# Patient Record
Sex: FEMALE | Race: WHITE | NOT HISPANIC OR LATINO | Employment: UNEMPLOYED | URBAN - METROPOLITAN AREA
[De-identification: names, ages, dates, MRNs, and addresses within clinical notes are randomized per-mention and may not be internally consistent; named-entity substitution may affect disease eponyms.]

---

## 2019-01-19 ENCOUNTER — HOSPITAL ENCOUNTER (OUTPATIENT)
Dept: OCCUPATIONAL THERAPY | Facility: HOSPITAL | Age: 44
End: 2019-01-20
Attending: INTERNAL MEDICINE | Admitting: INTERNAL MEDICINE

## 2019-01-19 LAB
ABS NEUT (OLG): 7.16 X10(3)/MCL (ref 2.1–9.2)
ALBUMIN SERPL-MCNC: 3.4 GM/DL (ref 3.4–5)
ALBUMIN/GLOB SERPL: 0.9 RATIO (ref 1.1–2)
ALP SERPL-CCNC: 750 UNIT/L (ref 38–126)
ALT SERPL-CCNC: 200 UNIT/L (ref 12–78)
APPEARANCE, UA: CLEAR
AST SERPL-CCNC: 195 UNIT/L (ref 15–37)
BACTERIA SPEC CULT: ABNORMAL /HPF
BASOPHILS # BLD AUTO: 0 X10(3)/MCL (ref 0–0.2)
BASOPHILS NFR BLD AUTO: 0 %
BILIRUB SERPL-MCNC: 3.4 MG/DL (ref 0.2–1)
BILIRUB UR QL STRIP: ABNORMAL
BILIRUBIN DIRECT+TOT PNL SERPL-MCNC: 0.9 MG/DL (ref 0–0.8)
BILIRUBIN DIRECT+TOT PNL SERPL-MCNC: 2.5 MG/DL (ref 0–0.5)
BUN SERPL-MCNC: 13 MG/DL (ref 7–18)
CALCIUM SERPL-MCNC: 9.5 MG/DL (ref 8.5–10.1)
CHLORIDE SERPL-SCNC: 105 MMOL/L (ref 98–107)
CO2 SERPL-SCNC: 27 MMOL/L (ref 21–32)
COLOR UR: ABNORMAL
CREAT SERPL-MCNC: 0.81 MG/DL (ref 0.55–1.02)
EOSINOPHIL # BLD AUTO: 0 X10(3)/MCL (ref 0–0.9)
EOSINOPHIL NFR BLD AUTO: 0 %
ERYTHROCYTE [DISTWIDTH] IN BLOOD BY AUTOMATED COUNT: 14 % (ref 11.5–17)
GLOBULIN SER-MCNC: 3.8 GM/DL (ref 2.4–3.5)
GLUCOSE (UA): NEGATIVE
GLUCOSE SERPL-MCNC: 95 MG/DL (ref 74–106)
HCT VFR BLD AUTO: 39.8 % (ref 37–47)
HGB BLD-MCNC: 12.6 GM/DL (ref 12–16)
HGB UR QL STRIP: NEGATIVE
KETONES UR QL STRIP: ABNORMAL
LACTATE SERPL-SCNC: 1.2 MMOL/L (ref 0.4–2)
LEUKOCYTE ESTERASE UR QL STRIP: ABNORMAL
LIPASE SERPL-CCNC: 63 UNIT/L (ref 73–393)
LYMPHOCYTES # BLD AUTO: 0.2 X10(3)/MCL (ref 0.6–4.6)
LYMPHOCYTES NFR BLD AUTO: 3 %
MCH RBC QN AUTO: 29.2 PG (ref 27–31)
MCHC RBC AUTO-ENTMCNC: 31.7 GM/DL (ref 33–36)
MCV RBC AUTO: 92.3 FL (ref 80–94)
MONOCYTES # BLD AUTO: 0.2 X10(3)/MCL (ref 0.1–1.3)
MONOCYTES NFR BLD AUTO: 3 %
NEUTROPHILS # BLD AUTO: 7.16 X10(3)/MCL (ref 2.1–9.2)
NEUTROPHILS NFR BLD AUTO: 93 %
NITRITE UR QL STRIP: NEGATIVE
PH UR STRIP: 8 [PH] (ref 5–9)
PLATELET # BLD AUTO: 220 X10(3)/MCL (ref 130–400)
PMV BLD AUTO: 11 FL (ref 9.4–12.4)
POTASSIUM SERPL-SCNC: 3.4 MMOL/L (ref 3.5–5.1)
PROT SERPL-MCNC: 7.2 GM/DL (ref 6.4–8.2)
PROT UR QL STRIP: NEGATIVE
RBC # BLD AUTO: 4.31 X10(6)/MCL (ref 4.2–5.4)
RBC #/AREA URNS HPF: ABNORMAL /[HPF]
SODIUM SERPL-SCNC: 138 MMOL/L (ref 136–145)
SP GR UR STRIP: 1.02 (ref 1–1.03)
SQUAMOUS EPITHELIAL, UA: ABNORMAL
UROBILINOGEN UR STRIP-ACNC: 4
WBC # SPEC AUTO: 7.7 X10(3)/MCL (ref 4.5–11.5)
WBC #/AREA URNS HPF: ABNORMAL /[HPF]

## 2019-01-20 LAB
ABS NEUT (OLG): 4.65 X10(3)/MCL (ref 2.1–9.2)
ALBUMIN SERPL-MCNC: 2.6 GM/DL (ref 3.4–5)
ALBUMIN/GLOB SERPL: 0.8 {RATIO}
ALP SERPL-CCNC: 528 UNIT/L (ref 38–126)
ALT SERPL-CCNC: 125 UNIT/L (ref 12–78)
AST SERPL-CCNC: 79 UNIT/L (ref 15–37)
BASOPHILS # BLD AUTO: 0 X10(3)/MCL (ref 0–0.2)
BASOPHILS NFR BLD AUTO: 0 %
BILIRUB SERPL-MCNC: 2 MG/DL (ref 0.2–1)
BILIRUBIN DIRECT+TOT PNL SERPL-MCNC: 0.8 MG/DL (ref 0–0.8)
BILIRUBIN DIRECT+TOT PNL SERPL-MCNC: 1.2 MG/DL (ref 0–0.2)
BUN SERPL-MCNC: 11 MG/DL (ref 7–18)
CALCIUM SERPL-MCNC: 8.9 MG/DL (ref 8.5–10.1)
CHLORIDE SERPL-SCNC: 109 MMOL/L (ref 98–107)
CO2 SERPL-SCNC: 26 MMOL/L (ref 21–32)
CREAT SERPL-MCNC: 0.72 MG/DL (ref 0.55–1.02)
EOSINOPHIL # BLD AUTO: 0.2 X10(3)/MCL (ref 0–0.9)
EOSINOPHIL NFR BLD AUTO: 3 %
ERYTHROCYTE [DISTWIDTH] IN BLOOD BY AUTOMATED COUNT: 14.2 % (ref 11.5–17)
GLOBULIN SER-MCNC: 3.2 GM/DL (ref 2.4–3.5)
GLUCOSE SERPL-MCNC: 168 MG/DL (ref 74–106)
HCT VFR BLD AUTO: 34.8 % (ref 37–47)
HGB BLD-MCNC: 10.6 GM/DL (ref 12–16)
LYMPHOCYTES # BLD AUTO: 0.4 X10(3)/MCL (ref 0.6–4.6)
LYMPHOCYTES NFR BLD AUTO: 8 %
MCH RBC QN AUTO: 28.8 PG (ref 27–31)
MCHC RBC AUTO-ENTMCNC: 30.5 GM/DL (ref 33–36)
MCV RBC AUTO: 94.6 FL (ref 80–94)
MONOCYTES # BLD AUTO: 0.3 X10(3)/MCL (ref 0.1–1.3)
MONOCYTES NFR BLD AUTO: 6 %
NEUTROPHILS # BLD AUTO: 4.65 X10(3)/MCL (ref 2.1–9.2)
NEUTROPHILS NFR BLD AUTO: 82 %
PLATELET # BLD AUTO: 201 X10(3)/MCL (ref 130–400)
PMV BLD AUTO: 10.5 FL (ref 9.4–12.4)
POTASSIUM SERPL-SCNC: 4 MMOL/L (ref 3.5–5.1)
PROT SERPL-MCNC: 5.8 GM/DL (ref 6.4–8.2)
RBC # BLD AUTO: 3.68 X10(6)/MCL (ref 4.2–5.4)
SODIUM SERPL-SCNC: 141 MMOL/L (ref 136–145)
WBC # SPEC AUTO: 5.6 X10(3)/MCL (ref 4.5–11.5)

## 2020-05-28 ENCOUNTER — TELEPHONE (OUTPATIENT)
Dept: TRANSPLANT | Facility: CLINIC | Age: 45
End: 2020-05-28

## 2020-05-28 NOTE — TELEPHONE ENCOUNTER
----- Message from Galina Baldwin sent at 5/28/2020  1:18 PM CDT -----    Have medical records that where scanned into media from [facility]. Will call referring MD office if we need any additional information on the pt.      ----- Message -----  From: Karrie Miller MA  Sent: 5/28/2020  10:07 AM CDT  To: [facility] Referral Pool    Good morning,     We received a referral from Dr.Luis Teague for this patient to be seen in hepatology for   elevated liver enzymes. I have scanned the referral and all records that were sent over into  for review. If you need anymore information you can contact Marcela Magana RN for  at [phone]. Once reviewed please reach out to the patient to schedule.     Thank you   [facility] Marilee

## 2020-06-01 ENCOUNTER — DOCUMENTATION ONLY (OUTPATIENT)
Dept: TRANSPLANT | Facility: CLINIC | Age: 45
End: 2020-06-01

## 2020-06-01 NOTE — LETTER
June 1, 2020    Tiffanie Gao  [address] [place] [address]  [place] [address]      Dear Tiffnaie Gao:    Your doctor has referred you to the [facility]. We are sending this letter to remind you to make an appointment with us to complete the referral process.     Please call us at [phone] to schedule an appointment. We look forward to seeing you soon.     If you received a call and have been scheduled, please disregard this letter.       Sincerely,        [facility]   [address] [place] [id number]  [phone]

## 2020-06-01 NOTE — NURSING
Pt records reviewed.  Pt will be referred to Hepatology due to elevated LFTS   Initial referral received  from Dr. Salazar Teague.  Referral letter sent to provider and patient.      RECORDS SCANNED IN MEDIA UNDER HEPATOLOGY REFERRAL .

## 2020-06-01 NOTE — LETTER
June 1, 2020    Salazar Teague MD  [address] [place] [id number]      Dear Dr. Teague    Patient: Tiffanie Gao   MR Number: [id number]   YOB: 1975     Thank you for the referral of Tiffanie Gao to the [facility] program. An initial appointment will be scheduled for your patient with one of our Hepatologists.      Thank you again for your trust in our program.  If there is anything we can do for you or your staff, please feel free to contact us.        Sincerely,        [facility]  [address] [place] [id number]  [phone]

## 2020-08-21 ENCOUNTER — TELEPHONE (OUTPATIENT)
Dept: HEPATOLOGY | Facility: CLINIC | Age: 45
End: 2020-08-21

## 2020-08-21 NOTE — TELEPHONE ENCOUNTER
Referral records reviewed (media). Patient missed visit today though should be rescheduled with MD only due to complexity. Need records sent over from [facility].

## 2020-10-06 ENCOUNTER — TELEPHONE (OUTPATIENT)
Dept: HEPATOLOGY | Facility: CLINIC | Age: 45
End: 2020-10-06

## 2020-10-06 NOTE — TELEPHONE ENCOUNTER
----- Message from An Emerson MA sent at 10/5/2020  2:25 PM CDT -----  Contact: pt  Needs MD appointment   ----- Message -----  From: Olivia Childers  Sent: 10/5/2020   2:20 PM CDT  To: Ramesh Soto Staff    Patient calling to schedule appt         Call Back : [phone]

## 2022-05-19 ENCOUNTER — TELEPHONE (OUTPATIENT)
Dept: HEPATOLOGY | Facility: CLINIC | Age: 47
End: 2022-05-19
Payer: MEDICAID

## 2022-05-19 NOTE — TELEPHONE ENCOUNTER
----- Message from Galina Baldwin sent at 5/18/2022 11:49 AM CDT -----  Regarding: FW: appt  Pt needs to re/yasmin her missed appt  ----- Message -----  From: Matilde Mares  Sent: 5/13/2022   2:55 PM CDT  To: [facility] Liver Referral Pool  Subject: appt                                             Dr PALOMA Teague is referring this pt to Hep for elevated lft (see full hx on refrl, recprds n media)    Can you plz ctc the pt to schedule appt?     Thank you,  Matilde Mares   Physician Referral Specialist  [facility]  Office [phone]  Fax [phone]

## 2022-05-19 NOTE — TELEPHONE ENCOUNTER
Call placed to pt to [facility]. No answer. Lvm.     CARROLL  -pt lives closer to [place] so can see Dr Childers  -pt can only be seen by MD per MICHAEL Chandler

## 2022-05-20 ENCOUNTER — TELEPHONE (OUTPATIENT)
Dept: HEPATOLOGY | Facility: CLINIC | Age: 47
End: 2022-05-20
Payer: MEDICAID

## 2022-05-20 NOTE — TELEPHONE ENCOUNTER
----- Message from Galina Baldwin sent at 5/18/2022 11:49 AM CDT -----  Regarding: FW: appt  Pt needs to re/yasmin her missed appt  ----- Message -----  From: Matilde Mares  Sent: 5/13/2022   2:55 PM CDT  To: [facility] Referral Pool  Subject: appt                                             Dr PALOMA Teague is referring this pt to Hep for elevated lft (see full hx on refrl, recprds n media)    Can you plz ctc the pt to schedule appt?     Thank you,  Matilde Mares   Physician Referral Specialist  [facility]  Office [phone]  Fax [phone]

## 2022-06-14 ENCOUNTER — TELEPHONE (OUTPATIENT)
Dept: HEPATOLOGY | Facility: CLINIC | Age: 47
End: 2022-06-14
Payer: MEDICAID

## 2022-06-14 RX ORDER — AZITHROMYCIN 250 MG/1
250 TABLET, FILM COATED ORAL
COMMUNITY
Start: 2022-05-24 | End: 2022-06-14

## 2022-06-14 RX ORDER — GABAPENTIN 800 MG/1
800 TABLET ORAL DAILY PRN
COMMUNITY
Start: 2022-02-23 | End: 2022-06-14

## 2022-06-14 RX ORDER — ESCITALOPRAM OXALATE 10 MG/1
10 TABLET ORAL DAILY
COMMUNITY
Start: 2022-04-05 | End: 2022-06-14

## 2022-06-14 RX ORDER — LEVOCETIRIZINE DIHYDROCHLORIDE 5 MG/1
5 TABLET, FILM COATED ORAL DAILY PRN
COMMUNITY
Start: 2022-04-14 | End: 2022-06-14

## 2022-06-14 RX ORDER — IBUPROFEN 600 MG/1
600 TABLET ORAL EVERY 6 HOURS PRN
COMMUNITY
Start: 2022-01-05 | End: 2022-06-14

## 2022-06-14 RX ORDER — AMOXICILLIN AND CLAVULANATE POTASSIUM 875; 125 MG/1; MG/1
1 TABLET, FILM COATED ORAL 2 TIMES DAILY
COMMUNITY
Start: 2022-06-03 | End: 2022-06-14

## 2022-06-14 RX ORDER — ACETAMINOPHEN AND CODEINE PHOSPHATE 300; 30 MG/1; MG/1
TABLET ORAL
COMMUNITY
Start: 2022-02-03 | End: 2022-06-14

## 2022-06-14 RX ORDER — HYDROCODONE BITARTRATE AND ACETAMINOPHEN 5; 325 MG/1; MG/1
1 TABLET ORAL EVERY 6 HOURS PRN
COMMUNITY
Start: 2022-01-05 | End: 2022-06-14

## 2022-06-14 RX ORDER — PANTOPRAZOLE SODIUM 40 MG/1
40 TABLET, DELAYED RELEASE ORAL DAILY
COMMUNITY
Start: 2022-04-12 | End: 2022-06-14

## 2022-06-14 RX ORDER — KETOROLAC TROMETHAMINE 10 MG/1
TABLET, FILM COATED ORAL
COMMUNITY
Start: 2022-01-09 | End: 2022-06-14

## 2022-06-14 RX ORDER — PROMETHAZINE HYDROCHLORIDE AND DEXTROMETHORPHAN HYDROBROMIDE 6.25; 15 MG/5ML; MG/5ML
5 SYRUP ORAL
COMMUNITY
Start: 2022-04-14 | End: 2022-06-14

## 2022-06-14 RX ORDER — DEXCHLORPHENIRAMINE MALEATE, DEXTROMETHORPHAN HBR, PHENYLEPHRINE HCL 1; 10; 5 MG/5ML; MG/5ML; MG/5ML
10 SYRUP ORAL EVERY 4 HOURS PRN
COMMUNITY
Start: 2022-05-23 | End: 2022-06-14

## 2022-06-14 RX ORDER — HYDROCORTISONE 25 MG/G
1 CREAM TOPICAL 2 TIMES DAILY
COMMUNITY
Start: 2022-03-16 | End: 2022-06-14

## 2022-06-14 RX ORDER — ALENDRONATE SODIUM 70 MG/1
70 TABLET ORAL
COMMUNITY
Start: 2022-06-10 | End: 2022-06-14

## 2022-06-14 RX ORDER — FLUOXETINE HYDROCHLORIDE 40 MG/1
40 CAPSULE ORAL DAILY
COMMUNITY
Start: 2022-02-23 | End: 2022-06-14

## 2022-06-14 RX ORDER — LORATADINE 10 MG/1
10 TABLET ORAL DAILY
COMMUNITY
Start: 2022-06-03 | End: 2022-06-14

## 2022-06-14 RX ORDER — DICLOFENAC SODIUM 75 MG/1
75 TABLET, DELAYED RELEASE ORAL 2 TIMES DAILY PRN
COMMUNITY
Start: 2022-04-26 | End: 2022-06-14

## 2022-06-14 RX ORDER — CEPHALEXIN 750 MG/1
750 CAPSULE ORAL 3 TIMES DAILY
COMMUNITY
Start: 2022-01-06 | End: 2022-06-14

## 2022-06-14 RX ORDER — ALBUTEROL SULFATE 90 UG/1
AEROSOL, METERED RESPIRATORY (INHALATION)
COMMUNITY
Start: 2022-05-17

## 2022-06-14 RX ORDER — HYDROXYZINE PAMOATE 25 MG/1
25 CAPSULE ORAL 3 TIMES DAILY PRN
COMMUNITY
Start: 2022-06-02 | End: 2023-04-14

## 2022-06-14 RX ORDER — BUSPIRONE HYDROCHLORIDE 30 MG/1
30 TABLET ORAL 2 TIMES DAILY
COMMUNITY
Start: 2022-02-23 | End: 2022-06-14

## 2022-06-14 RX ORDER — BUDESONIDE AND FORMOTEROL FUMARATE DIHYDRATE 160; 4.5 UG/1; UG/1
AEROSOL RESPIRATORY (INHALATION)
COMMUNITY
Start: 2022-05-17

## 2022-06-14 RX ORDER — CYCLOBENZAPRINE HYDROCHLORIDE 7.5 MG/1
TABLET, FILM COATED ORAL
COMMUNITY
Start: 2022-05-03 | End: 2022-06-14

## 2022-06-14 RX ORDER — KETOCONAZOLE 20 MG/G
CREAM TOPICAL DAILY
COMMUNITY
Start: 2022-06-03 | End: 2022-06-14

## 2022-06-14 RX ORDER — PHENOBARBITAL ELIXIR 16.2; .1037; .0194; .0065 MG/5ML; MG/5ML; MG/5ML; MG/5ML
ELIXIR ORAL
COMMUNITY
Start: 2021-12-21 | End: 2022-06-14

## 2022-06-14 RX ORDER — TIOTROPIUM BROMIDE 18 UG/1
1 CAPSULE ORAL; RESPIRATORY (INHALATION) DAILY
COMMUNITY
Start: 2022-05-17 | End: 2022-06-14

## 2022-06-14 RX ORDER — METRONIDAZOLE 500 MG/1
500 TABLET ORAL 2 TIMES DAILY
COMMUNITY
Start: 2022-01-14 | End: 2022-06-14

## 2022-06-14 RX ORDER — SOD SULF/POT CHLORIDE/MAG SULF 1.479 G
TABLET ORAL
COMMUNITY
Start: 2022-05-11 | End: 2022-06-14

## 2022-06-14 RX ORDER — PREDNISONE 20 MG/1
20 TABLET ORAL 2 TIMES DAILY
COMMUNITY
Start: 2022-05-23 | End: 2022-06-14

## 2022-06-14 RX ORDER — METHYLPREDNISOLONE 4 MG/1
TABLET ORAL
COMMUNITY
Start: 2022-05-24 | End: 2022-06-14

## 2022-06-14 RX ORDER — SUCRALFATE 1 G/1
1 TABLET ORAL 2 TIMES DAILY PRN
COMMUNITY
Start: 2021-12-21 | End: 2022-06-14

## 2022-06-14 RX ORDER — NITROFURANTOIN 25; 75 MG/1; MG/1
100 CAPSULE ORAL 2 TIMES DAILY
COMMUNITY
Start: 2022-01-14 | End: 2022-06-14

## 2022-06-14 RX ORDER — SODIUM, POTASSIUM,MAG SULFATES 17.5-3.13G
SOLUTION, RECONSTITUTED, ORAL ORAL
COMMUNITY
Start: 2022-01-04 | End: 2022-06-14

## 2022-06-14 RX ORDER — PAROXETINE 10 MG/1
10 TABLET, FILM COATED ORAL DAILY
COMMUNITY
Start: 2022-06-02 | End: 2023-01-20

## 2022-06-17 ENCOUNTER — TELEPHONE (OUTPATIENT)
Dept: HEPATOLOGY | Facility: CLINIC | Age: 47
End: 2022-06-17
Payer: MEDICAID

## 2022-06-17 NOTE — TELEPHONE ENCOUNTER
----- Message from Christopher William sent at 6/17/2022  3:52 PM CDT -----  Regarding: call back  Pt needs help with visit    Call

## 2022-08-18 NOTE — PROGRESS NOTES
[id number] Shift Summary  For vital signs and complete assessments, please see documentation flowsheets.     Pertinent assessments: Tele: ST. CIWA negative. Denies nausea. Reports lower abdominal pain, oxy given. Abd appears distended & pt reports abd fullness. Voiding ok w/no issues.   Major Shift Events: Pt HR up to 140 for 30 sec w/activity.   Treatment Plan: IVF, pain control, CIWAs       [facility] Hepatology Consult Note    Referring provider: Dr. Salazar Teague  PCP: Claude H Meeks, MD    Chief complaint:  Elevated LFTs    HPI:  Tiffanie Gao is a 46 y.o. female who was referred to Hepatology Clinic for elevated LFTs.    She reports undergoing cholecystectomy in 2011 with subsequent biliary complications requiring ERCP with stent placement.  This was further complicated by a recurrent cholangitis and bacteremia for which she underwent PTC with drain placement.  She says that in 2017 she required surgery at [facility] for biliary reconstruction and liver resection.    She says that since her first surgery she has had fluctuating though persistently elevated LFTs.  The most recent available labs from 01/2022 show T bili 2.1, alkaline phosphatase 572, AST 1411, .  Ultrasound in 05/2020 showed liver normal in size but minimally heterogeneous in echogenicity.      She is a previous social drinker it denies history of heavy alcohol use.  She had a tattoo placed in 1990s.  She has never received a blood transfusion and denies history of IV drug use. She has no known family history of liver disease. She denies signs of decompensated liver disease including no recent abdominal distension, encephalopathy, jaundice, GI bleeding.    Past Medical History:   Diagnosis Date    Asthma     COPD (chronic obstructive pulmonary disease)     Liver disease     Mental disorder        Past Surgical History:   Procedure Laterality Date    Bile duct reconstruction      CHOLECYSTECTOMY      HYSTERECTOMY         Family History   Problem Relation Age of Onset    Heart disease Paternal Grandmother     Cancer Maternal Grandmother     Diabetes Maternal Grandmother     Heart disease Maternal Grandmother     Hypertension Father     Hypertension Mother             Current Outpatient Medications   Medication Sig Dispense Refill    albuterol (PROVENTIL/VENTOLIN HFA) 90 mcg/actuation inhaler Inhale into the lungs.    "   albuterol-ipratropium (DUO-NEB) 2.5 mg-0.5 mg/3 mL nebulizer solution Inhale 3 mLs into the lungs every 6 (six) hours as needed.      hydrOXYzine pamoate (VISTARIL) 25 MG Cap Take 25 mg by mouth 3 (three) times daily as needed.      omeprazole (PRILOSEC) 20 MG capsule Take 20 mg by mouth.      ondansetron (ZOFRAN-ODT) 4 MG TbDL Take 4 mg by mouth every 6 (six) hours as needed.      pantoprazole (PROTONIX) 40 MG tablet Take 40 mg by mouth once daily.      paroxetine (PAXIL) 20 MG tablet Take 20 mg by mouth once daily.      SYMBICORT 160-4.5 mcg/actuation HFAA Inhale into the lungs.      paroxetine (PAXIL) 10 MG tablet Take 10 mg by mouth once daily.       No current facility-administered medications for this visit.       Review of patient's allergies indicates:   Allergen Reactions    Sumatriptan Nausea And Vomiting     Other reaction(s): vomiting       Review of Systems   Constitutional: Negative for fever and weight loss.   Cardiovascular: Negative for leg swelling.   Gastrointestinal: Negative for abdominal pain, blood in stool, constipation, diarrhea, heartburn, melena, nausea and vomiting.       Vitals:    08/19/22 0840   BP: 114/68   Pulse: 91   Weight: 47.7 kg (105 lb 4.3 oz)   Height: 4' 9" (1.448 m)       Physical Exam  Vitals reviewed.   Constitutional:       General: She is not in acute distress.  Eyes:      General: No scleral icterus.  Cardiovascular:      Rate and Rhythm: Normal rate and regular rhythm.   Pulmonary:      Effort: Pulmonary effort is normal. No respiratory distress.   Abdominal:      General: Bowel sounds are normal. There is no distension.      Palpations: Abdomen is soft.      Tenderness: There is no abdominal tenderness. There is no guarding or rebound.   Musculoskeletal:      Right lower leg: No edema.      Left lower leg: No edema.   Skin:     Coloration: Skin is not jaundiced.         LABS: I personally reviewed pertinent laboratory findings.    Lab Results   Component " Value Date    WBC 5.9 12/08/2019    HGB 13.5 12/08/2019    HCT 41.3 12/08/2019    MCV 92.6 12/08/2019     12/08/2019       Lab Results   Component Value Date     12/08/2019    K 4.7 12/08/2019    CO2 29.0 12/08/2019    BUN 13.0 12/08/2019    CREATININE 0.81 12/08/2019    CALCIUM 10.0 12/08/2019    EGFRNONAA >60 12/08/2019       Lab Results   Component Value Date     (H) 12/08/2019     (H) 12/08/2019    ALKPHOS 806 (H) 12/08/2019    BILITOT 2.3 (H) 12/08/2019       Lab Results   Component Value Date    HEPAIGM Negative 06/22/2018    HEPCAB Negative 06/22/2018       No results found for: JEANNINE, MITOAB, SMOOTHMUSCAB, IGG, CERULOPLSM     Computed MELD-Na score unavailable. Necessary lab results were not found in the last year.  Computed MELD score unavailable. Necessary lab results were not found in the last year.     Outside Labs 01/2022:  - Total bilirubin 2.1  - Alkaline phosphatase 572  - AST 1411  -     IMAGING: I personally reviewed imaging studies.    Assessment:  46 y.o. female who was referred to Hepatology Clinic for elevated LFTs.  She reports history of elevated LFTs since undergoing cholecystectomy in 2011 with biliary complications requiring ERCP with stent placement and ultimately requiring biliary reconstruction and liver resection. Labs 01/2022 showed T bili 2.1, alkaline phosphatase 572, AST 1411, .  Ultrasound in 05/2020 showed liver normal in size but minimally heterogeneous in echogenicity.  For    1. Elevated LFTs    2. Jaundice        Recommendations:  - Repeat serologic evaluation including hepatitis panel, PETH, JEANNINE, ASMA, AMA, celiac serologies, iron studies, A1AT levels, ceruloplasmin to evaluate for etiology of elevated LFTs  - Obtain MRI w/wo contrast + MRCP  - If above unrevealing and/or LFTs remain significantly elevated, she will need a liver biopsy    I have sent communication to the referring physician and/or primary care provider.    I spent a  total of 60 minutes on the day of the visit. This includes face to face time and non-face to face time preparing to see the patient (eg, review of tests), obtaining and/or reviewing separately obtained history, documenting clinical information in the electronic or other health record, independently interpreting results, and communicating results to the patient/family/caregiver, or care coordination.    Ata Childers MD  Staff Physician  Hepatology and Liver Transplant  [facility]  [facility]

## 2022-08-19 ENCOUNTER — PATIENT MESSAGE (OUTPATIENT)
Dept: HEPATOLOGY | Facility: CLINIC | Age: 47
End: 2022-08-19

## 2022-08-19 ENCOUNTER — LAB VISIT (OUTPATIENT)
Dept: LAB | Facility: HOSPITAL | Age: 47
End: 2022-08-19
Attending: STUDENT IN AN ORGANIZED HEALTH CARE EDUCATION/TRAINING PROGRAM
Payer: MEDICAID

## 2022-08-19 ENCOUNTER — OFFICE VISIT (OUTPATIENT)
Dept: HEPATOLOGY | Facility: CLINIC | Age: 47
End: 2022-08-19
Payer: MEDICAID

## 2022-08-19 ENCOUNTER — TELEPHONE (OUTPATIENT)
Dept: HEPATOLOGY | Facility: CLINIC | Age: 47
End: 2022-08-19
Payer: MEDICAID

## 2022-08-19 VITALS
WEIGHT: 105.25 LBS | DIASTOLIC BLOOD PRESSURE: 68 MMHG | HEIGHT: 57 IN | HEART RATE: 91 BPM | BODY MASS INDEX: 22.71 KG/M2 | SYSTOLIC BLOOD PRESSURE: 114 MMHG

## 2022-08-19 DIAGNOSIS — R17 JAUNDICE: ICD-10-CM

## 2022-08-19 DIAGNOSIS — R79.89 ELEVATED LFTS: Primary | ICD-10-CM

## 2022-08-19 DIAGNOSIS — R79.89 ELEVATED LFTS: ICD-10-CM

## 2022-08-19 LAB
ALBUMIN SERPL-MCNC: 3.7 GM/DL (ref 3.5–5)
ALBUMIN/GLOB SERPL: 1.2 RATIO (ref 1.1–2)
ALP SERPL-CCNC: 551 UNIT/L (ref 40–150)
ALT SERPL-CCNC: 80 UNIT/L (ref 0–55)
AST SERPL-CCNC: 69 UNIT/L (ref 5–34)
BASOPHILS # BLD AUTO: 0.06 X10(3)/MCL (ref 0–0.2)
BASOPHILS NFR BLD AUTO: 1.2 %
BILIRUBIN DIRECT+TOT PNL SERPL-MCNC: 0.6 MG/DL
BUN SERPL-MCNC: 12.7 MG/DL (ref 7–18.7)
CALCIUM SERPL-MCNC: 10.2 MG/DL (ref 8.4–10.2)
CHLORIDE SERPL-SCNC: 103 MMOL/L (ref 98–107)
CO2 SERPL-SCNC: 33 MMOL/L (ref 22–29)
CREAT SERPL-MCNC: 0.7 MG/DL (ref 0.55–1.02)
EOSINOPHIL # BLD AUTO: 0.1 X10(3)/MCL (ref 0–0.9)
EOSINOPHIL NFR BLD AUTO: 2 %
ERYTHROCYTE [DISTWIDTH] IN BLOOD BY AUTOMATED COUNT: 13.1 % (ref 11.5–17)
FERRITIN SERPL-MCNC: 110.84 NG/ML (ref 4.63–204)
GFR SERPLBLD CREATININE-BSD FMLA CKD-EPI: >60 MLS/MIN/1.73/M2
GLOBULIN SER-MCNC: 3.1 GM/DL (ref 2.4–3.5)
GLUCOSE SERPL-MCNC: 85 MG/DL (ref 74–100)
HAV AB SER QL IA: NONREACTIVE
HAV IGM SERPL QL IA: NONREACTIVE
HBV CORE AB SERPL QL IA: NONREACTIVE
HBV CORE IGM SERPL QL IA: NONREACTIVE
HBV SURFACE AG SERPL QL IA: NONREACTIVE
HCT VFR BLD AUTO: 43.5 % (ref 37–47)
HCV AB SERPL QL IA: NONREACTIVE
HGB BLD-MCNC: 13.7 GM/DL (ref 12–16)
IGG SERPL-MCNC: 763 MG/DL (ref 522–1631)
IMM GRANULOCYTES # BLD AUTO: 0.02 X10(3)/MCL (ref 0–0.04)
IMM GRANULOCYTES NFR BLD AUTO: 0.4 %
INR BLD: 0.94 (ref 0–1.3)
IRON SATN MFR SERPL: 39 % (ref 20–50)
IRON SERPL-MCNC: 120 UG/DL (ref 50–170)
LYMPHOCYTES # BLD AUTO: 1.53 X10(3)/MCL (ref 0.6–4.6)
LYMPHOCYTES NFR BLD AUTO: 30.6 %
MCH RBC QN AUTO: 29.7 PG (ref 27–31)
MCHC RBC AUTO-ENTMCNC: 31.5 MG/DL (ref 33–36)
MCV RBC AUTO: 94.4 FL (ref 80–94)
MONOCYTES # BLD AUTO: 0.37 X10(3)/MCL (ref 0.1–1.3)
MONOCYTES NFR BLD AUTO: 7.4 %
NEUTROPHILS # BLD AUTO: 2.9 X10(3)/MCL (ref 2.1–9.2)
NEUTROPHILS NFR BLD AUTO: 58.4 %
NRBC BLD AUTO-RTO: 0 %
PLATELET # BLD AUTO: 329 X10(3)/MCL (ref 130–400)
PMV BLD AUTO: 10.3 FL (ref 7.4–10.4)
POTASSIUM SERPL-SCNC: 4.6 MMOL/L (ref 3.5–5.1)
PROT SERPL-MCNC: 6.8 GM/DL (ref 6.4–8.3)
PROTHROMBIN TIME: 12.5 SECONDS (ref 12.5–14.5)
RBC # BLD AUTO: 4.61 X10(6)/MCL (ref 4.2–5.4)
SODIUM SERPL-SCNC: 142 MMOL/L (ref 136–145)
TIBC SERPL-MCNC: 186 UG/DL (ref 70–310)
TIBC SERPL-MCNC: 306 UG/DL (ref 250–450)
TRANSFERRIN SERPL-MCNC: 272 MG/DL (ref 180–382)
WBC # SPEC AUTO: 5 X10(3)/MCL (ref 4.5–11.5)

## 2022-08-19 PROCEDURE — 1160F RVW MEDS BY RX/DR IN RCRD: CPT | Mod: CPTII,S$GLB,, | Performed by: STUDENT IN AN ORGANIZED HEALTH CARE EDUCATION/TRAINING PROGRAM

## 2022-08-19 PROCEDURE — 85025 COMPLETE CBC W/AUTO DIFF WBC: CPT

## 2022-08-19 PROCEDURE — 3074F SYST BP LT 130 MM HG: CPT | Mod: CPTII,S$GLB,, | Performed by: STUDENT IN AN ORGANIZED HEALTH CARE EDUCATION/TRAINING PROGRAM

## 2022-08-19 PROCEDURE — 3078F PR MOST RECENT DIASTOLIC BLOOD PRESSURE < 80 MM HG: ICD-10-PCS | Mod: CPTII,S$GLB,, | Performed by: STUDENT IN AN ORGANIZED HEALTH CARE EDUCATION/TRAINING PROGRAM

## 2022-08-19 PROCEDURE — 86704 HEP B CORE ANTIBODY TOTAL: CPT

## 2022-08-19 PROCEDURE — 86225 DNA ANTIBODY NATIVE: CPT

## 2022-08-19 PROCEDURE — 86708 HEPATITIS A ANTIBODY: CPT

## 2022-08-19 PROCEDURE — 3008F BODY MASS INDEX DOCD: CPT | Mod: CPTII,S$GLB,, | Performed by: STUDENT IN AN ORGANIZED HEALTH CARE EDUCATION/TRAINING PROGRAM

## 2022-08-19 PROCEDURE — 83516 IMMUNOASSAY NONANTIBODY: CPT

## 2022-08-19 PROCEDURE — 83540 ASSAY OF IRON: CPT

## 2022-08-19 PROCEDURE — 80321 ALCOHOLS BIOMARKERS 1OR 2: CPT

## 2022-08-19 PROCEDURE — 80053 COMPREHEN METABOLIC PANEL: CPT

## 2022-08-19 PROCEDURE — 99205 OFFICE O/P NEW HI 60 MIN: CPT | Mod: S$GLB,,, | Performed by: STUDENT IN AN ORGANIZED HEALTH CARE EDUCATION/TRAINING PROGRAM

## 2022-08-19 PROCEDURE — 99205 PR OFFICE/OUTPT VISIT, NEW, LEVL V, 60-74 MIN: ICD-10-PCS | Mod: S$GLB,,, | Performed by: STUDENT IN AN ORGANIZED HEALTH CARE EDUCATION/TRAINING PROGRAM

## 2022-08-19 PROCEDURE — 3074F PR MOST RECENT SYSTOLIC BLOOD PRESSURE < 130 MM HG: ICD-10-PCS | Mod: CPTII,S$GLB,, | Performed by: STUDENT IN AN ORGANIZED HEALTH CARE EDUCATION/TRAINING PROGRAM

## 2022-08-19 PROCEDURE — 1159F MED LIST DOCD IN RCRD: CPT | Mod: CPTII,S$GLB,, | Performed by: STUDENT IN AN ORGANIZED HEALTH CARE EDUCATION/TRAINING PROGRAM

## 2022-08-19 PROCEDURE — 82784 ASSAY IGA/IGD/IGG/IGM EACH: CPT

## 2022-08-19 PROCEDURE — 80074 ACUTE HEPATITIS PANEL: CPT

## 2022-08-19 PROCEDURE — 1159F PR MEDICATION LIST DOCUMENTED IN MEDICAL RECORD: ICD-10-PCS | Mod: CPTII,S$GLB,, | Performed by: STUDENT IN AN ORGANIZED HEALTH CARE EDUCATION/TRAINING PROGRAM

## 2022-08-19 PROCEDURE — 3008F PR BODY MASS INDEX (BMI) DOCUMENTED: ICD-10-PCS | Mod: CPTII,S$GLB,, | Performed by: STUDENT IN AN ORGANIZED HEALTH CARE EDUCATION/TRAINING PROGRAM

## 2022-08-19 PROCEDURE — 36415 COLL VENOUS BLD VENIPUNCTURE: CPT

## 2022-08-19 PROCEDURE — 1160F PR REVIEW ALL MEDS BY PRESCRIBER/CLIN PHARMACIST DOCUMENTED: ICD-10-PCS | Mod: CPTII,S$GLB,, | Performed by: STUDENT IN AN ORGANIZED HEALTH CARE EDUCATION/TRAINING PROGRAM

## 2022-08-19 PROCEDURE — 85610 PROTHROMBIN TIME: CPT

## 2022-08-19 PROCEDURE — 82728 ASSAY OF FERRITIN: CPT

## 2022-08-19 PROCEDURE — 3078F DIAST BP <80 MM HG: CPT | Mod: CPTII,S$GLB,, | Performed by: STUDENT IN AN ORGANIZED HEALTH CARE EDUCATION/TRAINING PROGRAM

## 2022-08-19 RX ORDER — OMEPRAZOLE 20 MG/1
20 CAPSULE, DELAYED RELEASE ORAL
COMMUNITY
End: 2023-04-14

## 2022-08-19 RX ORDER — ONDANSETRON 4 MG/1
4 TABLET, ORALLY DISINTEGRATING ORAL EVERY 6 HOURS PRN
COMMUNITY
Start: 2022-08-12

## 2022-08-19 RX ORDER — PAROXETINE HYDROCHLORIDE 20 MG/1
20 TABLET, FILM COATED ORAL DAILY
COMMUNITY
Start: 2022-08-03 | End: 2023-01-20

## 2022-08-19 RX ORDER — PANTOPRAZOLE SODIUM 40 MG/1
40 TABLET, DELAYED RELEASE ORAL DAILY
COMMUNITY
Start: 2022-08-03 | End: 2023-04-14

## 2022-08-19 RX ORDER — IPRATROPIUM BROMIDE AND ALBUTEROL SULFATE 2.5; .5 MG/3ML; MG/3ML
3 SOLUTION RESPIRATORY (INHALATION) EVERY 6 HOURS PRN
COMMUNITY

## 2022-08-19 NOTE — TELEPHONE ENCOUNTER
I returned patient phone call. Patient would like to know lab results.  Message sent to Dr Childers.

## 2022-08-19 NOTE — Clinical Note
Please arrange MRI 1-2 weeks in [facility] if able. Please also video visit in 2-3 months.

## 2022-08-20 LAB
CERULOPLASMIN SERPL-MCNC: 35.6 MG/DL (ref 20–51)
PATH REV: NORMAL
SMOOTH MUSCLE AB SER QL IF: NEGATIVE

## 2022-08-22 LAB
A1AT SERPL NEPH-MCNC: 167 MG/DL (ref 100–190)
ANTINUCLEAR ANTIBODY SCREEN (OHS): NEGATIVE
DSDNA ANTIBODY (OHS): NEGATIVE
HBV SURFACE AB SER QL IA: NEGATIVE
HBV SURFACE AB SERPL IA-ACNC: <5 MIU/ML
IGA SERPL-MCNC: 262 MG/DL (ref 61–356)
IMMUNOLOGIST REVIEW: NORMAL
MITOCHONDRIA M2 AB SER IA-ACNC: <0.1 U
TTG IGA SER IA-ACNC: <1.2 U/ML

## 2022-08-23 ENCOUNTER — PATIENT MESSAGE (OUTPATIENT)
Dept: HEPATOLOGY | Facility: CLINIC | Age: 47
End: 2022-08-23
Payer: MEDICAID

## 2022-08-27 LAB — MAYO GENERIC ORDERABLE RESULT: NORMAL

## 2022-09-13 ENCOUNTER — PATIENT MESSAGE (OUTPATIENT)
Dept: HEPATOLOGY | Facility: CLINIC | Age: 47
End: 2022-09-13
Payer: MEDICAID

## 2022-09-13 ENCOUNTER — TELEPHONE (OUTPATIENT)
Dept: HEPATOLOGY | Facility: CLINIC | Age: 47
End: 2022-09-13
Payer: MEDICAID

## 2022-09-13 NOTE — TELEPHONE ENCOUNTER
----- Message from Ata Childers MD sent at 9/13/2022  7:57 AM CDT -----  Please let Ms. Gao know that testing for other causes of liver issues such as viruses, autoimmune diseases, and genetic disorders is negative.

## 2022-09-17 ENCOUNTER — TELEPHONE (OUTPATIENT)
Dept: HEPATOLOGY | Facility: CLINIC | Age: 47
End: 2022-09-17
Payer: MEDICAID

## 2022-09-17 ENCOUNTER — PATIENT MESSAGE (OUTPATIENT)
Dept: HEPATOLOGY | Facility: CLINIC | Age: 47
End: 2022-09-17
Payer: MEDICAID

## 2022-09-28 ENCOUNTER — TELEPHONE (OUTPATIENT)
Dept: HEPATOLOGY | Facility: CLINIC | Age: 47
End: 2022-09-28
Payer: MEDICAID

## 2022-11-07 ENCOUNTER — TELEPHONE (OUTPATIENT)
Dept: HEPATOLOGY | Facility: CLINIC | Age: 47
End: 2022-11-07
Payer: MEDICAID

## 2022-11-07 NOTE — TELEPHONE ENCOUNTER
----- Message from Leo Arthur sent at 11/7/2022  9:05 AM CST -----  Regarding: reschedule MRI  Patient calling to reschedule MRI from 9/8/2022.    Call: [phone] (Mobile

## 2022-11-07 NOTE — TELEPHONE ENCOUNTER
Contacted pt and rescheduled mri for next Friday , pt will like to move f/u to jan 20 @ 10:30 w Dr. Alvarado noriega location

## 2022-11-08 ENCOUNTER — TELEPHONE (OUTPATIENT)
Dept: HEPATOLOGY | Facility: CLINIC | Age: 47
End: 2022-11-08
Payer: MEDICAID

## 2022-11-08 NOTE — TELEPHONE ENCOUNTER
----- Message from Leo Arthur sent at 11/8/2022  9:23 AM CST -----  Regarding: Virtual Visit appt  Patient calling to notify she has been trying to get on the virtual visit line. Patient asking for a phone call.    Call: [phone] ([employer]

## 2023-01-19 NOTE — PROGRESS NOTES
[facility] Hepatology Follow Up Note    Referring provider: No ref. provider found  PCP: Claude H Meeks, MD    Chief complaint:  Follow up elevated LFTs    HPI:  Tiffanie Gao is a 47 y.o. female with history of elevated LFTs who presents for scheduled follow up.    1/20/23:  She reports history intermittent abdominal pain but is otherwise without complaints today.  No recent hospitalizations or ED visits.  She canceled previous MRI/MRCP due to being sick and has not yet rescheduled. She denies signs of decompensated liver disease including no recent abdominal distension, encephalopathy, jaundice, GI bleeding.    8/18/22 Initial Visit: She reports undergoing cholecystectomy in 2011 with subsequent biliary complications requiring ERCP with stent placement.  This was further complicated by a recurrent cholangitis and bacteremia for which she underwent PTC with drain placement.  She says that in 2017 she required surgery at [facility] for biliary reconstruction and liver resection.    She says that since her first surgery she has had fluctuating though persistently elevated LFTs.  The most recent available labs from 01/2022 show T bili 2.1, alkaline phosphatase 572, AST 1411, .  Ultrasound in 05/2020 showed liver normal in size but minimally heterogeneous in echogenicity.      She is a previous social drinker but denies history of heavy alcohol use.  She had a tattoo placed in 1990s.  She has never received a blood transfusion and denies history of IV drug use. She has no known family history of liver disease. She denies signs of decompensated liver disease including no recent abdominal distension, encephalopathy, jaundice, GI bleeding.    Past Medical History:   Diagnosis Date    Asthma     COPD (chronic obstructive pulmonary disease)     Liver disease     Mental disorder        Past Surgical History:   Procedure Laterality Date    Bile duct reconstruction      CHOLECYSTECTOMY      HYSTERECTOMY         Family History  "  Problem Relation Age of Onset    Heart disease Paternal Grandmother     Cancer Maternal Grandmother     Diabetes Maternal Grandmother     Heart disease Maternal Grandmother     Hypertension Father     Hypertension Mother        Social History     Tobacco Use    Smoking status: Never    Smokeless tobacco: Never   Substance Use Topics    Alcohol use: Not Currently    Drug use: Never       Current Outpatient Medications   Medication Sig Dispense Refill    albuterol (PROVENTIL/VENTOLIN HFA) 90 mcg/actuation inhaler Inhale into the lungs.      albuterol-ipratropium (DUO-NEB) 2.5 mg-0.5 mg/3 mL nebulizer solution Inhale 3 mLs into the lungs every 6 (six) hours as needed.      cyclobenzaprine (FLEXERIL) 10 MG tablet Take 10 mg by mouth 2 (two) times daily as needed.      hydrOXYzine pamoate (VISTARIL) 25 MG Cap Take 25 mg by mouth 3 (three) times daily as needed.      LATUDA 40 mg Tab tablet Take 40 mg by mouth every evening.      omeprazole (PRILOSEC) 20 MG capsule Take 20 mg by mouth.      ondansetron (ZOFRAN-ODT) 4 MG TbDL Take 4 mg by mouth every 6 (six) hours as needed.      SYMBICORT 160-4.5 mcg/actuation HFAA Inhale into the lungs.      alendronate (FOSAMAX) 70 MG tablet Take 70 mg by mouth every 7 days.      pantoprazole (PROTONIX) 40 MG tablet Take 40 mg by mouth once daily.       No current facility-administered medications for this visit.       Review of patient's allergies indicates:   Allergen Reactions    Sumatriptan Nausea And Vomiting     Other reaction(s): vomiting       Review of Systems   Constitutional:  Negative for fever and weight loss.   Cardiovascular:  Negative for leg swelling.   Gastrointestinal:  Negative for abdominal pain, blood in stool, constipation, diarrhea, heartburn, melena, nausea and vomiting.     Vitals:    01/20/23 1006   BP: (!) 126/58   Pulse: 94   Weight: 48.5 kg (106 lb 14.8 oz)   Height: 4' 9" (1.448 m)       Physical Exam  Vitals reviewed.   Constitutional:       General: " She is not in acute distress.  Eyes:      General: No scleral icterus.  Cardiovascular:      Rate and Rhythm: Normal rate and regular rhythm.   Pulmonary:      Effort: Pulmonary effort is normal. No respiratory distress.   Abdominal:      General: Bowel sounds are normal. There is no distension.      Palpations: Abdomen is soft.      Tenderness: There is no abdominal tenderness. There is no guarding or rebound.   Musculoskeletal:      Right lower leg: No edema.      Left lower leg: No edema.   Skin:     Coloration: Skin is not jaundiced.       LABS: I personally reviewed pertinent laboratory findings.    Lab Results   Component Value Date    WBC 5.0 08/19/2022    HGB 13.7 08/19/2022    HCT 43.5 08/19/2022    MCV 94.4 (H) 08/19/2022     08/19/2022       Lab Results   Component Value Date     08/19/2022    K 4.6 08/19/2022    CO2 33 (H) 08/19/2022    BUN 12.7 08/19/2022    CREATININE 0.70 08/19/2022    CALCIUM 10.2 08/19/2022    EGFRNONAA >60 12/08/2019       Lab Results   Component Value Date    ALT 80 (H) 08/19/2022    AST 69 (H) 08/19/2022    ALKPHOS 551 (H) 08/19/2022    BILITOT 0.6 08/19/2022       Lab Results   Component Value Date    HEPAIGM Nonreactive 08/19/2022    HEPBCAB Nonreactive 08/19/2022    HEPCAB Nonreactive 08/19/2022       Lab Results   Component Value Date    JEANNINE Negative 08/19/2022      Outside Labs 01/2022:  - Total bilirubin 2.1  - Alkaline phosphatase 572  - AST 1411  -     IMAGING: I personally reviewed imaging studies.    Assessment:  47 y.o. female with history of elevated LFTs who presents for scheduled follow up. She reports history of elevated LFTs since undergoing cholecystectomy in 2011 with biliary complications requiring ERCP with stent placement and ultimately requiring biliary reconstruction and liver resection. Labs 01/2022 showed T bili 2.1, alkaline phosphatase 572, AST 1411, .  Ultrasound in 05/2020 showed liver normal in size but minimally  heterogeneous in echogenicity. Serologic work up has been unrevealing.    1. Elevated LFTs        Recommendations:  - Obtain MRI w/wo contrast + MRCP  - If above unrevealing and/or LFTs remain significantly elevated, she will need a liver biopsy    I spent a total of 30 minutes on the day of the visit. This includes face to face time and non-face to face time preparing to see the patient (eg, review of tests), obtaining and/or reviewing separately obtained history, documenting clinical information in the electronic or other health record, independently interpreting results, and communicating results to the patient/family/caregiver, or care coordination.    Ata Childers MD  Staff Physician  Hepatology and Liver Transplant  [facility]  [facility]

## 2023-01-20 ENCOUNTER — OFFICE VISIT (OUTPATIENT)
Dept: HEPATOLOGY | Facility: CLINIC | Age: 48
End: 2023-01-20
Payer: MEDICAID

## 2023-01-20 VITALS
DIASTOLIC BLOOD PRESSURE: 58 MMHG | SYSTOLIC BLOOD PRESSURE: 126 MMHG | BODY MASS INDEX: 23.07 KG/M2 | HEART RATE: 94 BPM | WEIGHT: 106.94 LBS | HEIGHT: 57 IN

## 2023-01-20 DIAGNOSIS — R79.89 ELEVATED LFTS: Primary | ICD-10-CM

## 2023-01-20 PROCEDURE — 3078F DIAST BP <80 MM HG: CPT | Mod: CPTII,S$GLB,, | Performed by: STUDENT IN AN ORGANIZED HEALTH CARE EDUCATION/TRAINING PROGRAM

## 2023-01-20 PROCEDURE — 3074F SYST BP LT 130 MM HG: CPT | Mod: CPTII,S$GLB,, | Performed by: STUDENT IN AN ORGANIZED HEALTH CARE EDUCATION/TRAINING PROGRAM

## 2023-01-20 PROCEDURE — 1160F RVW MEDS BY RX/DR IN RCRD: CPT | Mod: CPTII,S$GLB,, | Performed by: STUDENT IN AN ORGANIZED HEALTH CARE EDUCATION/TRAINING PROGRAM

## 2023-01-20 PROCEDURE — 99214 PR OFFICE/OUTPT VISIT, EST, LEVL IV, 30-39 MIN: ICD-10-PCS | Mod: S$GLB,,, | Performed by: STUDENT IN AN ORGANIZED HEALTH CARE EDUCATION/TRAINING PROGRAM

## 2023-01-20 PROCEDURE — 99214 OFFICE O/P EST MOD 30 MIN: CPT | Mod: S$GLB,,, | Performed by: STUDENT IN AN ORGANIZED HEALTH CARE EDUCATION/TRAINING PROGRAM

## 2023-01-20 PROCEDURE — 1159F MED LIST DOCD IN RCRD: CPT | Mod: CPTII,S$GLB,, | Performed by: STUDENT IN AN ORGANIZED HEALTH CARE EDUCATION/TRAINING PROGRAM

## 2023-01-20 PROCEDURE — 3078F PR MOST RECENT DIASTOLIC BLOOD PRESSURE < 80 MM HG: ICD-10-PCS | Mod: CPTII,S$GLB,, | Performed by: STUDENT IN AN ORGANIZED HEALTH CARE EDUCATION/TRAINING PROGRAM

## 2023-01-20 PROCEDURE — 3008F BODY MASS INDEX DOCD: CPT | Mod: CPTII,S$GLB,, | Performed by: STUDENT IN AN ORGANIZED HEALTH CARE EDUCATION/TRAINING PROGRAM

## 2023-01-20 PROCEDURE — 3008F PR BODY MASS INDEX (BMI) DOCUMENTED: ICD-10-PCS | Mod: CPTII,S$GLB,, | Performed by: STUDENT IN AN ORGANIZED HEALTH CARE EDUCATION/TRAINING PROGRAM

## 2023-01-20 PROCEDURE — 1159F PR MEDICATION LIST DOCUMENTED IN MEDICAL RECORD: ICD-10-PCS | Mod: CPTII,S$GLB,, | Performed by: STUDENT IN AN ORGANIZED HEALTH CARE EDUCATION/TRAINING PROGRAM

## 2023-01-20 PROCEDURE — 1160F PR REVIEW ALL MEDS BY PRESCRIBER/CLIN PHARMACIST DOCUMENTED: ICD-10-PCS | Mod: CPTII,S$GLB,, | Performed by: STUDENT IN AN ORGANIZED HEALTH CARE EDUCATION/TRAINING PROGRAM

## 2023-01-20 PROCEDURE — 3074F PR MOST RECENT SYSTOLIC BLOOD PRESSURE < 130 MM HG: ICD-10-PCS | Mod: CPTII,S$GLB,, | Performed by: STUDENT IN AN ORGANIZED HEALTH CARE EDUCATION/TRAINING PROGRAM

## 2023-01-20 RX ORDER — ALENDRONATE SODIUM 70 MG/1
70 TABLET ORAL
COMMUNITY
Start: 2023-01-10 | End: 2023-04-14

## 2023-01-20 RX ORDER — CYCLOBENZAPRINE HCL 10 MG
10 TABLET ORAL 2 TIMES DAILY PRN
COMMUNITY
Start: 2023-01-10 | End: 2023-04-14

## 2023-01-20 RX ORDER — LURASIDONE HYDROCHLORIDE 40 MG/1
40 TABLET, FILM COATED ORAL NIGHTLY
COMMUNITY
Start: 2023-01-10

## 2023-01-23 ENCOUNTER — PATIENT MESSAGE (OUTPATIENT)
Dept: HEPATOLOGY | Facility: CLINIC | Age: 48
End: 2023-01-23
Payer: MEDICAID

## 2023-01-25 ENCOUNTER — TELEPHONE (OUTPATIENT)
Dept: HEPATOLOGY | Facility: CLINIC | Age: 48
End: 2023-01-25
Payer: MEDICAID

## 2023-01-25 NOTE — TELEPHONE ENCOUNTER
Spoke with patient. Scheduled follow up visit in 3 months. Patient would like to do her labs outside [facility] that is close to her house. Will mail external order to patient.

## 2023-01-31 ENCOUNTER — TELEPHONE (OUTPATIENT)
Dept: HEPATOLOGY | Facility: CLINIC | Age: 48
End: 2023-01-31
Payer: MEDICAID

## 2023-01-31 NOTE — TELEPHONE ENCOUNTER
----- Message from Snow Muñiz RN sent at 1/31/2023  1:25 PM CST -----  Evert Sommer,  Are we done with the scheduling?  I see the MRI in [place]. Still need Labs.  Let me know.  Thanks    ----- Message -----  From: Kemal Gray MA  Sent: 1/25/2023  10:00 AM CST  To: Snow Muñiz RN    Hey Ms. Ramirez,     Can you place external lab order for this patient? linda do her lab outside [facility].    Thanks    ----- Message -----  From: Ata Childers MD  Sent: 1/23/2023   8:51 PM CST  To: [facility] Staff    Return in [facility] in 3 months with labs

## 2023-01-31 NOTE — TELEPHONE ENCOUNTER
Contacted patient and got her labs scheduled same day as her mri appointment. Mailed appt reminder to patient.

## 2023-02-17 ENCOUNTER — HOSPITAL ENCOUNTER (OUTPATIENT)
Dept: RADIOLOGY | Facility: HOSPITAL | Age: 48
Discharge: HOME | End: 2023-02-17
Attending: STUDENT IN AN ORGANIZED HEALTH CARE EDUCATION/TRAINING PROGRAM
Payer: MEDICAID

## 2023-02-17 DIAGNOSIS — R79.89 ELEVATED LFTS: ICD-10-CM

## 2023-02-17 DIAGNOSIS — R17 JAUNDICE: ICD-10-CM

## 2023-02-17 PROCEDURE — 25500020 PHARM REV CODE 255

## 2023-02-17 PROCEDURE — 76376 3D RENDER W/INTRP POSTPROCES: CPT | Mod: TC

## 2023-02-17 PROCEDURE — 74183 MRI ABD W/O CNTR FLWD CNTR: CPT | Mod: TC

## 2023-02-17 PROCEDURE — A9577 INJ MULTIHANCE: HCPCS

## 2023-02-17 RX ADMIN — GADOBENATE DIMEGLUMINE 11 ML: 529 INJECTION, SOLUTION INTRAVENOUS at 08:02

## 2023-02-27 ENCOUNTER — TELEPHONE (OUTPATIENT)
Dept: HEPATOLOGY | Facility: CLINIC | Age: 48
End: 2023-02-27
Payer: MEDICAID

## 2023-02-28 NOTE — TELEPHONE ENCOUNTER
Patient: Tiffanie Gao       MRN: [id number]      : 1975     Age: 47 y.o.  [address] [facility] [address]    Providers  Hepatologists: Ata Childers MD    Priority of review: Other    Patient Transplant Status: Other - hepatology patient    Reason for presentation: Reassessment    Clinical Summary:  47-year-old female with history of cholecystectomy  with subsequent biliary complications requiring ERCP and stent placement.  Continued to have recurrent cholangitis and bacteremia with subsequent PTC and drain placement.  Ultimately required biliary reconstruction and liver resection in 2017.  Now has persistently elevated LFTs (alk phos + transaminases) with intermittently very high transaminases.  Serologic work up negative. Given biliary issues, obtained MRI with/without contrast + MRCP which shows intrahepatic biliary dilation. Any evidence of stricture or other explanation for elevated LFTs?    Imaging to be reviewed: MRI/MRCP 2023    HCC Treatment History: N/A    Platelets:   Lab Results   Component Value Date/Time     2023 08:32 AM     Creatinine:   Lab Results   Component Value Date/Time    CREATININE 0.82 2023 08:32 AM     Bilirubin:   Lab Results   Component Value Date/Time    BILITOT 0.7 2023 08:32 AM     AFP Last 3 each if available: No results found for: AFP, EXTAFP    MELD: MELD-Na score: 6 at 2023  8:32 AM  MELD score: 6 at 2023  8:32 AM  Calculated from:  Serum Creatinine: 0.82 mg/dL (Using min of 1 mg/dL) at 2023  8:32 AM  Serum Sodium: 141 mmol/L (Using max of 137 mmol/L) at 2023  8:32 AM  Total Bilirubin: 0.7 mg/dL (Using min of 1 mg/dL) at 2023  8:32 AM  INR(ratio): 0.90 (Using min of 1) at 2023  8:32 AM  Age: 47 years    Plan:     Follow-up Provider:

## 2023-03-03 ENCOUNTER — TELEPHONE (OUTPATIENT)
Dept: HEPATOLOGY | Facility: CLINIC | Age: 48
End: 2023-03-03
Payer: MEDICAID

## 2023-03-13 ENCOUNTER — PATIENT MESSAGE (OUTPATIENT)
Dept: HEPATOLOGY | Facility: CLINIC | Age: 48
End: 2023-03-13
Payer: MEDICAID

## 2023-04-13 NOTE — PROGRESS NOTES
[facility] Hepatology Follow Up Note    Referring provider: No ref. provider found  PCP: Claude H Meeks, MD    Chief complaint:  Follow up elevated LFTs    HPI:  Tiffanie Gao is a 47 y.o. female with history of elevated LFTs who presents for scheduled follow up.    4/14/23: She reports ongoing intermittent abdominal pain but is otherwise without complaints today. No recent hospitalizations or ED visits. She denies signs of decompensated liver disease including no recent abdominal distension, encephalopathy, jaundice, GI bleeding.    1/20/23:  She reports history intermittent abdominal pain but is otherwise without complaints today.  No recent hospitalizations or ED visits.  She canceled previous MRI/MRCP due to being sick and has not yet rescheduled. She denies signs of decompensated liver disease including no recent abdominal distension, encephalopathy, jaundice, GI bleeding.    8/18/22 Initial Visit: She reports undergoing cholecystectomy in 2011 with subsequent biliary complications requiring ERCP with stent placement.  This was further complicated by a recurrent cholangitis and bacteremia for which she underwent PTC with drain placement.  She says that in 2017 she required surgery at [facility] for biliary reconstruction and liver resection.    She says that since her first surgery she has had fluctuating though persistently elevated LFTs.  The most recent available labs from 01/2022 show T bili 2.1, alkaline phosphatase 572, AST 1411, .  Ultrasound in 05/2020 showed liver normal in size but minimally heterogeneous in echogenicity.      She is a previous social drinker but denies history of heavy alcohol use.  She had a tattoo placed in 1990s.  She has never received a blood transfusion and denies history of IV drug use. She has no known family history of liver disease. She denies signs of decompensated liver disease including no recent abdominal distension, encephalopathy, jaundice, GI bleeding.    Past  Medical History:   Diagnosis Date    Asthma     COPD (chronic obstructive pulmonary disease)     Liver disease     Mental disorder        Past Surgical History:   Procedure Laterality Date    Bile duct reconstruction      CHOLECYSTECTOMY      HYSTERECTOMY         Family History   Problem Relation Age of Onset    Heart disease Paternal Grandmother     Cancer Maternal Grandmother     Diabetes Maternal Grandmother     Heart disease Maternal Grandmother     Hypertension Father     Hypertension Mother        Social History     Tobacco Use    Smoking status: Never    Smokeless tobacco: Never   Substance Use Topics    Alcohol use: Not Currently    Drug use: Never       Current Outpatient Medications   Medication Sig Dispense Refill    albuterol (PROVENTIL/VENTOLIN HFA) 90 mcg/actuation inhaler Inhale into the lungs.      albuterol-ipratropium (DUO-NEB) 2.5 mg-0.5 mg/3 mL nebulizer solution Inhale 3 mLs into the lungs every 6 (six) hours as needed.      ALPRAZolam (XANAX) 0.5 MG tablet Take 0.5 mg by mouth daily as needed.      baclofen (LIORESAL) 10 MG tablet Take 10 mg by mouth.      lansoprazole (PREVACID) 30 MG capsule Take 30 mg by mouth.      LATUDA 40 mg Tab tablet Take 40 mg by mouth every evening.      ondansetron (ZOFRAN-ODT) 4 MG TbDL Take 4 mg by mouth every 6 (six) hours as needed.      SYMBICORT 160-4.5 mcg/actuation HFAA Inhale into the lungs.      famotidine (PEPCID) 40 MG tablet Take 40 mg by mouth.       No current facility-administered medications for this visit.       Review of patient's allergies indicates:   Allergen Reactions    Sumatriptan Nausea And Vomiting     Other reaction(s): vomiting       Review of Systems   Constitutional:  Negative for fever and weight loss.   Cardiovascular:  Negative for leg swelling.   Gastrointestinal:  Positive for abdominal pain. Negative for blood in stool, constipation, diarrhea, heartburn, melena, nausea and vomiting.     Vitals:    04/14/23 1111   BP: 108/75    Pulse: (!) 2   Weight: 49.8 kg (109 lb 12.6 oz)         Physical Exam  Constitutional:       General: She is not in acute distress.  Eyes:      General: No scleral icterus.  Cardiovascular:      Rate and Rhythm: Normal rate and regular rhythm.   Pulmonary:      Effort: Pulmonary effort is normal. No respiratory distress.   Abdominal:      General: Bowel sounds are normal. There is no distension.      Palpations: Abdomen is soft.      Tenderness: There is no abdominal tenderness. There is no guarding or rebound.   Musculoskeletal:      Right lower leg: No edema.      Left lower leg: No edema.   Skin:     Coloration: Skin is not jaundiced.       LABS: I personally reviewed pertinent laboratory findings.    Lab Results   Component Value Date    WBC 5.0 02/17/2023    HGB 14.3 02/17/2023    HCT 43.6 02/17/2023    MCV 87.2 02/17/2023     02/17/2023       Lab Results   Component Value Date     04/14/2023    K 4.5 04/14/2023    CO2 30 (H) 04/14/2023    BUN 15.5 04/14/2023    CREATININE 0.88 04/14/2023    CALCIUM 10.0 04/14/2023    EGFRNONAA >60 12/08/2019       Lab Results   Component Value Date    ALT 56 (H) 04/14/2023    AST 45 (H) 04/14/2023    ALKPHOS 390 (H) 04/14/2023    BILITOT 0.6 04/14/2023       Lab Results   Component Value Date    HEPAIGM Nonreactive 08/19/2022    HEPBCAB Nonreactive 08/19/2022    HEPCAB Nonreactive 08/19/2022       Lab Results   Component Value Date    JEANNINE Negative 08/19/2022      Outside Labs 01/2022:  - Total bilirubin 2.1  - Alkaline phosphatase 572  - AST 1411  -     IMAGING: I personally reviewed imaging studies.    Assessment:  47 y.o. female with history of elevated LFTs who presents for scheduled follow up. She reports history of elevated LFTs since undergoing cholecystectomy in 2011 with biliary complications requiring ERCP with stent placement and ultimately requiring biliary reconstruction and liver resection. Labs 01/2022 showed T bili 2.1, alkaline phosphatase  572, AST 1411, .  Ultrasound in 05/2020 showed liver normal in size but minimally heterogeneous in echogenicity. Serologic work up has been unrevealing.  MRI/MRCP 02/2023 reviewed in multidisciplinary liver radiology conference and showed chronic dilation of biliary tree with possible stricture in right hepatic lobe.  Recommendations were for ERCP.  However, she has had significant improvement in her LFTs without intervention.    1. Elevated LFTs          Recommendations:  - Will continue monitoring LFTs every 6-8 weeks. If they worsen or fail to improve further, will proceed with ERCP +/- liver biopsy    Return 6 months    I spent a total of 30 minutes on the day of the visit. This includes face to face time and non-face to face time preparing to see the patient (eg, review of tests), obtaining and/or reviewing separately obtained history, documenting clinical information in the electronic or other health record, independently interpreting results, and communicating results to the patient/family/caregiver, or care coordination.    Ata Childers MD  Staff Physician  Hepatology and Liver Transplant  [facility]  [facility] [place]

## 2023-04-14 ENCOUNTER — OFFICE VISIT (OUTPATIENT)
Dept: HEPATOLOGY | Facility: CLINIC | Age: 48
End: 2023-04-14
Payer: MEDICAID

## 2023-04-14 ENCOUNTER — LAB VISIT (OUTPATIENT)
Dept: LAB | Facility: HOSPITAL | Age: 48
End: 2023-04-14
Attending: STUDENT IN AN ORGANIZED HEALTH CARE EDUCATION/TRAINING PROGRAM
Payer: MEDICAID

## 2023-04-14 VITALS
DIASTOLIC BLOOD PRESSURE: 75 MMHG | BODY MASS INDEX: 23.76 KG/M2 | SYSTOLIC BLOOD PRESSURE: 108 MMHG | WEIGHT: 109.81 LBS | HEART RATE: 2 BPM

## 2023-04-14 DIAGNOSIS — R79.89 ELEVATED LFTS: Primary | ICD-10-CM

## 2023-04-14 DIAGNOSIS — R79.89 ELEVATED LFTS: ICD-10-CM

## 2023-04-14 LAB
ALBUMIN SERPL-MCNC: 3.9 G/DL (ref 3.5–5)
ALBUMIN/GLOB SERPL: 1.3 RATIO (ref 1.1–2)
ALP SERPL-CCNC: 390 UNIT/L (ref 40–150)
ALT SERPL-CCNC: 56 UNIT/L (ref 0–55)
AST SERPL-CCNC: 45 UNIT/L (ref 5–34)
BILIRUBIN DIRECT+TOT PNL SERPL-MCNC: 0.6 MG/DL
BUN SERPL-MCNC: 15.5 MG/DL (ref 7–18.7)
CALCIUM SERPL-MCNC: 10 MG/DL (ref 8.4–10.2)
CHLORIDE SERPL-SCNC: 104 MMOL/L (ref 98–107)
CO2 SERPL-SCNC: 30 MMOL/L (ref 22–29)
CREAT SERPL-MCNC: 0.88 MG/DL (ref 0.55–1.02)
GFR SERPLBLD CREATININE-BSD FMLA CKD-EPI: >60 MLS/MIN/1.73/M2
GLOBULIN SER-MCNC: 3 GM/DL (ref 2.4–3.5)
GLUCOSE SERPL-MCNC: 88 MG/DL (ref 74–100)
POTASSIUM SERPL-SCNC: 4.5 MMOL/L (ref 3.5–5.1)
PROT SERPL-MCNC: 6.9 GM/DL (ref 6.4–8.3)
SODIUM SERPL-SCNC: 143 MMOL/L (ref 136–145)

## 2023-04-14 PROCEDURE — 3078F PR MOST RECENT DIASTOLIC BLOOD PRESSURE < 80 MM HG: ICD-10-PCS | Mod: CPTII,S$GLB,, | Performed by: STUDENT IN AN ORGANIZED HEALTH CARE EDUCATION/TRAINING PROGRAM

## 2023-04-14 PROCEDURE — 36415 COLL VENOUS BLD VENIPUNCTURE: CPT

## 2023-04-14 PROCEDURE — 99214 PR OFFICE/OUTPT VISIT, EST, LEVL IV, 30-39 MIN: ICD-10-PCS | Mod: S$GLB,,, | Performed by: STUDENT IN AN ORGANIZED HEALTH CARE EDUCATION/TRAINING PROGRAM

## 2023-04-14 PROCEDURE — 3008F BODY MASS INDEX DOCD: CPT | Mod: CPTII,S$GLB,, | Performed by: STUDENT IN AN ORGANIZED HEALTH CARE EDUCATION/TRAINING PROGRAM

## 2023-04-14 PROCEDURE — 3074F SYST BP LT 130 MM HG: CPT | Mod: CPTII,S$GLB,, | Performed by: STUDENT IN AN ORGANIZED HEALTH CARE EDUCATION/TRAINING PROGRAM

## 2023-04-14 PROCEDURE — 1159F MED LIST DOCD IN RCRD: CPT | Mod: CPTII,S$GLB,, | Performed by: STUDENT IN AN ORGANIZED HEALTH CARE EDUCATION/TRAINING PROGRAM

## 2023-04-14 PROCEDURE — 80053 COMPREHEN METABOLIC PANEL: CPT

## 2023-04-14 PROCEDURE — 3078F DIAST BP <80 MM HG: CPT | Mod: CPTII,S$GLB,, | Performed by: STUDENT IN AN ORGANIZED HEALTH CARE EDUCATION/TRAINING PROGRAM

## 2023-04-14 PROCEDURE — 99214 OFFICE O/P EST MOD 30 MIN: CPT | Mod: S$GLB,,, | Performed by: STUDENT IN AN ORGANIZED HEALTH CARE EDUCATION/TRAINING PROGRAM

## 2023-04-14 PROCEDURE — 3008F PR BODY MASS INDEX (BMI) DOCUMENTED: ICD-10-PCS | Mod: CPTII,S$GLB,, | Performed by: STUDENT IN AN ORGANIZED HEALTH CARE EDUCATION/TRAINING PROGRAM

## 2023-04-14 PROCEDURE — 3074F PR MOST RECENT SYSTOLIC BLOOD PRESSURE < 130 MM HG: ICD-10-PCS | Mod: CPTII,S$GLB,, | Performed by: STUDENT IN AN ORGANIZED HEALTH CARE EDUCATION/TRAINING PROGRAM

## 2023-04-14 PROCEDURE — 1159F PR MEDICATION LIST DOCUMENTED IN MEDICAL RECORD: ICD-10-PCS | Mod: CPTII,S$GLB,, | Performed by: STUDENT IN AN ORGANIZED HEALTH CARE EDUCATION/TRAINING PROGRAM

## 2023-04-14 RX ORDER — ALPRAZOLAM 0.5 MG/1
0.5 TABLET ORAL DAILY PRN
COMMUNITY
Start: 2023-04-12

## 2023-04-14 RX ORDER — FAMOTIDINE 40 MG/1
40 TABLET, FILM COATED ORAL
COMMUNITY
Start: 2023-04-11

## 2023-04-14 RX ORDER — BACLOFEN 10 MG/1
10 TABLET ORAL
COMMUNITY
Start: 2023-04-12

## 2023-04-14 RX ORDER — LANSOPRAZOLE 30 MG/1
30 CAPSULE, DELAYED RELEASE ORAL
COMMUNITY
Start: 2023-04-07

## 2023-04-18 ENCOUNTER — TELEPHONE (OUTPATIENT)
Dept: HEPATOLOGY | Facility: CLINIC | Age: 48
End: 2023-04-18
Payer: MEDICAID

## 2023-04-18 ENCOUNTER — PATIENT MESSAGE (OUTPATIENT)
Dept: HEPATOLOGY | Facility: CLINIC | Age: 48
End: 2023-04-18
Payer: MEDICAID

## 2023-05-29 ENCOUNTER — TELEPHONE (OUTPATIENT)
Dept: HEPATOLOGY | Facility: CLINIC | Age: 48
End: 2023-05-29
Payer: MEDICAID

## 2023-05-29 NOTE — TELEPHONE ENCOUNTER
Received a fax request from [facility], [facility], Claude Meeks, MD, and [facility]uns[facility].   [address] [place] [address].  Phone: [phone]  Fax:[phone].      Last Office Note from [facility] 4/14/23 faxed to Office as requested.  Received receipt of confirmation received.   SEE POSTERIOR UVEITIS.

## 2023-06-14 ENCOUNTER — LAB VISIT (OUTPATIENT)
Dept: LAB | Facility: HOSPITAL | Age: 48
End: 2023-06-14
Attending: STUDENT IN AN ORGANIZED HEALTH CARE EDUCATION/TRAINING PROGRAM
Payer: MEDICAID

## 2023-06-14 DIAGNOSIS — R79.89 ELEVATED LFTS: ICD-10-CM

## 2023-06-14 LAB
ALBUMIN SERPL-MCNC: 3.8 G/DL (ref 3.5–5)
ALBUMIN/GLOB SERPL: 1.2 RATIO (ref 1.1–2)
ALP SERPL-CCNC: 678 UNIT/L (ref 40–150)
ALT SERPL-CCNC: 56 UNIT/L (ref 0–55)
AST SERPL-CCNC: 53 UNIT/L (ref 5–34)
BILIRUBIN DIRECT+TOT PNL SERPL-MCNC: 0.7 MG/DL
BUN SERPL-MCNC: 11.3 MG/DL (ref 7–18.7)
CALCIUM SERPL-MCNC: 9.8 MG/DL (ref 8.4–10.2)
CHLORIDE SERPL-SCNC: 102 MMOL/L (ref 98–107)
CO2 SERPL-SCNC: 31 MMOL/L (ref 22–29)
CREAT SERPL-MCNC: 0.85 MG/DL (ref 0.55–1.02)
GFR SERPLBLD CREATININE-BSD FMLA CKD-EPI: >60 MLS/MIN/1.73/M2
GLOBULIN SER-MCNC: 3.1 GM/DL (ref 2.4–3.5)
GLUCOSE SERPL-MCNC: 106 MG/DL (ref 74–100)
POTASSIUM SERPL-SCNC: 4.8 MMOL/L (ref 3.5–5.1)
PROT SERPL-MCNC: 6.9 GM/DL (ref 6.4–8.3)
SODIUM SERPL-SCNC: 141 MMOL/L (ref 136–145)

## 2023-06-14 PROCEDURE — 36415 COLL VENOUS BLD VENIPUNCTURE: CPT

## 2023-06-14 PROCEDURE — 80053 COMPREHEN METABOLIC PANEL: CPT

## 2023-06-23 ENCOUNTER — TELEPHONE (OUTPATIENT)
Dept: HEPATOLOGY | Facility: CLINIC | Age: 48
End: 2023-06-23
Payer: MEDICAID

## 2023-06-23 NOTE — TELEPHONE ENCOUNTER
"----- Message from Jovan Wolfe MA sent at 6/23/2023 10:15 AM CDT -----    ----- Message -----  From: Gudelia Lima RN  Sent: 6/23/2023   9:38 AM CDT  To: Jovan Wolfe MA    Please call patient or have the nurse triage patient and get more information.    Thanks  ----- Message -----  From: Jovan Wolfe MA  Sent: 6/22/2023   2:36 PM CDT  To: Gudelia Lima RN, Ata Childers MD      ----- Message -----  From: Tha Hermes  Sent: 6/22/2023   1:08 PM CDT  To: [facility] Staff    Consult/Advisory:          Name Of Caller: Self      Contact Preference?: [phone]       Provider Name:  [facility]      Does patient feel the need to be seen today? Yes      What is the nature of the call?: Calling to speak w/ nurse about being seen today (virtually or by phone). Stating she went to her family doctor and that provider recommended she be seen today due to her side being very sensitive.          Additional Notes:  "Thank you for all that you do for our patients"            "

## 2023-06-23 NOTE — TELEPHONE ENCOUNTER
Spoke with patient.  LFT's on 6/14 are worse.  Patient having RU abd pain.Tender to touch.  PCP advised patient to go to the ED.    Per Dr Childers last progress note.  Recommendations:  - Will continue monitoring LFTs every 6-8 weeks. If they worsen or fail to improve further, will proceed with ERCP +/- liver biopsy    I instructed patient to go to the ED.   Patient will go to [facility].  Dr Childers notified via secure chat.

## 2023-07-10 ENCOUNTER — TELEPHONE (OUTPATIENT)
Dept: HEPATOLOGY | Facility: CLINIC | Age: 48
End: 2023-07-10
Payer: MEDICAID

## 2023-07-10 NOTE — TELEPHONE ENCOUNTER
Patient called to tell Dr Childers that she is feeling very fatigue and also has abdominal pain.    Patient instructed to see PCP or go to ER.

## 2023-07-10 NOTE — TELEPHONE ENCOUNTER
----- Message from Jovan Wolfe MA sent at 7/10/2023  2:10 PM CDT -----  Regarding: FW: speak to Nurse    ----- Message -----  From: Jody Dupont  Sent: 7/10/2023   1:50 PM CDT  To: [facility] Staff  Subject: speak to Nurse                                   The patient called requesting to speak to nurse states she is having problems with her levels continuing to go up, also states has been having pain and very, very tired  Nothing is tasting good, appetite decrease  things taste funny    Would like to go ahead and scheduled biopsy if possible to see what's going on  Please reach out at your earliest convenience     No further information provided      Patient can be contacted @# [phone] (home)

## 2023-08-14 ENCOUNTER — LAB VISIT (OUTPATIENT)
Dept: LAB | Facility: HOSPITAL | Age: 48
End: 2023-08-14
Attending: STUDENT IN AN ORGANIZED HEALTH CARE EDUCATION/TRAINING PROGRAM
Payer: MEDICAID

## 2023-08-14 DIAGNOSIS — R79.89 ELEVATED LFTS: ICD-10-CM

## 2023-08-14 LAB
ALBUMIN SERPL-MCNC: 3.6 G/DL (ref 3.5–5)
ALBUMIN/GLOB SERPL: 1.2 RATIO (ref 1.1–2)
ALP SERPL-CCNC: 544 UNIT/L (ref 40–150)
ALT SERPL-CCNC: 56 UNIT/L (ref 0–55)
AST SERPL-CCNC: 45 UNIT/L (ref 5–34)
BILIRUB SERPL-MCNC: 0.8 MG/DL
BUN SERPL-MCNC: 10.5 MG/DL (ref 7–18.7)
CALCIUM SERPL-MCNC: 9.7 MG/DL (ref 8.4–10.2)
CHLORIDE SERPL-SCNC: 100 MMOL/L (ref 98–107)
CO2 SERPL-SCNC: 30 MMOL/L (ref 22–29)
CREAT SERPL-MCNC: 0.74 MG/DL (ref 0.55–1.02)
GFR SERPLBLD CREATININE-BSD FMLA CKD-EPI: >60 MLS/MIN/1.73/M2
GLOBULIN SER-MCNC: 2.9 GM/DL (ref 2.4–3.5)
GLUCOSE SERPL-MCNC: 102 MG/DL (ref 74–100)
POTASSIUM SERPL-SCNC: 4 MMOL/L (ref 3.5–5.1)
PROT SERPL-MCNC: 6.5 GM/DL (ref 6.4–8.3)
SODIUM SERPL-SCNC: 140 MMOL/L (ref 136–145)

## 2023-08-14 PROCEDURE — 36415 COLL VENOUS BLD VENIPUNCTURE: CPT

## 2023-08-14 PROCEDURE — 80053 COMPREHEN METABOLIC PANEL: CPT

## 2023-10-18 ENCOUNTER — TELEPHONE (OUTPATIENT)
Dept: HEPATOLOGY | Facility: CLINIC | Age: 48
End: 2023-10-18
Payer: MEDICAID

## 2023-10-18 NOTE — TELEPHONE ENCOUNTER
Returned pts call. Pt wanted to know next steps after her results from labs in August. Per Dr. Arcos note, pt needed follow up in 6 months. Pt vu and appt scheduled     ----- Message from Leo Arthur sent at 10/18/2023  9:40 AM CDT -----  Regarding: Consult/Advisory  Contact: Tiffanie Gao  Consult/Advisory    Name Of Caller: Tiffanie Gao       Contact Preference: [phone] (home)      Nature of call: Patient calling to speak to staff after having labs drawn on 8/14/2023. Patient states she hasn't heard back. Requesting a call back.

## 2024-01-22 ENCOUNTER — TELEPHONE (OUTPATIENT)
Dept: HEPATOLOGY | Facility: CLINIC | Age: 49
End: 2024-01-22
Payer: MEDICAID

## 2024-01-22 NOTE — TELEPHONE ENCOUNTER
Returned pts call. Pt needed to r/s appt due to transportation. Vu and appt r/s, no further questions     ----- Message from Jody Dupont sent at 1/22/2024  8:55 AM CST -----  Regarding: r/s appt  Contact: PT  [phone]  The patient called requesting to r/s appt - I was unable to find a date    No further information provided      Patient can be contacted @# [phone]  \

## 2024-02-05 ENCOUNTER — OFFICE VISIT (OUTPATIENT)
Dept: HEPATOLOGY | Facility: CLINIC | Age: 49
End: 2024-02-05
Payer: MEDICAID

## 2024-02-05 ENCOUNTER — LAB VISIT (OUTPATIENT)
Dept: LAB | Facility: HOSPITAL | Age: 49
End: 2024-02-05
Attending: STUDENT IN AN ORGANIZED HEALTH CARE EDUCATION/TRAINING PROGRAM
Payer: MEDICAID

## 2024-02-05 VITALS
BODY MASS INDEX: 22.65 KG/M2 | HEART RATE: 74 BPM | HEIGHT: 57 IN | OXYGEN SATURATION: 98 % | DIASTOLIC BLOOD PRESSURE: 89 MMHG | WEIGHT: 105 LBS | SYSTOLIC BLOOD PRESSURE: 122 MMHG | RESPIRATION RATE: 18 BRPM

## 2024-02-05 DIAGNOSIS — R79.89 ELEVATED LFTS: ICD-10-CM

## 2024-02-05 DIAGNOSIS — R79.89 ELEVATED LFTS: Primary | ICD-10-CM

## 2024-02-05 LAB
ALBUMIN SERPL-MCNC: 3.9 G/DL (ref 3.5–5)
ALBUMIN/GLOB SERPL: 1 RATIO (ref 1.1–2)
ALP SERPL-CCNC: 323 UNIT/L (ref 40–150)
ALT SERPL-CCNC: 91 UNIT/L (ref 0–55)
AST SERPL-CCNC: 56 UNIT/L (ref 5–34)
BASOPHILS # BLD AUTO: 0.07 X10(3)/MCL
BASOPHILS NFR BLD AUTO: 0.9 %
BILIRUB SERPL-MCNC: 0.7 MG/DL
BUN SERPL-MCNC: 12.1 MG/DL (ref 9.8–20.1)
CALCIUM SERPL-MCNC: 10.2 MG/DL (ref 8.4–10.2)
CHLORIDE SERPL-SCNC: 105 MMOL/L (ref 98–107)
CO2 SERPL-SCNC: 27 MMOL/L (ref 22–29)
CREAT SERPL-MCNC: 0.82 MG/DL (ref 0.55–1.02)
EOSINOPHIL # BLD AUTO: 0.06 X10(3)/MCL (ref 0–0.9)
EOSINOPHIL NFR BLD AUTO: 0.8 %
ERYTHROCYTE [DISTWIDTH] IN BLOOD BY AUTOMATED COUNT: 13.9 % (ref 11.5–17)
GFR SERPLBLD CREATININE-BSD FMLA CKD-EPI: >60 MLS/MIN/1.73/M2
GLOBULIN SER-MCNC: 4 GM/DL (ref 2.4–3.5)
GLUCOSE SERPL-MCNC: 96 MG/DL (ref 74–100)
HCT VFR BLD AUTO: 41.6 % (ref 37–47)
HGB BLD-MCNC: 13.9 G/DL (ref 12–16)
IMM GRANULOCYTES # BLD AUTO: 0.03 X10(3)/MCL (ref 0–0.04)
IMM GRANULOCYTES NFR BLD AUTO: 0.4 %
INR PPP: 0.9 (ref 2–3)
LYMPHOCYTES # BLD AUTO: 1.95 X10(3)/MCL (ref 0.6–4.6)
LYMPHOCYTES NFR BLD AUTO: 25.7 %
MCH RBC QN AUTO: 30 PG (ref 27–31)
MCHC RBC AUTO-ENTMCNC: 33.4 G/DL (ref 33–36)
MCV RBC AUTO: 89.8 FL (ref 80–94)
MONOCYTES # BLD AUTO: 0.34 X10(3)/MCL (ref 0.1–1.3)
MONOCYTES NFR BLD AUTO: 4.5 %
NEUTROPHILS # BLD AUTO: 5.15 X10(3)/MCL (ref 2.1–9.2)
NEUTROPHILS NFR BLD AUTO: 67.7 %
NRBC BLD AUTO-RTO: 0 %
PLATELET # BLD AUTO: 303 X10(3)/MCL (ref 130–400)
PMV BLD AUTO: 9.8 FL (ref 7.4–10.4)
POTASSIUM SERPL-SCNC: 3.6 MMOL/L (ref 3.5–5.1)
PROT SERPL-MCNC: 7.9 GM/DL (ref 6.4–8.3)
PROTHROMBIN TIME: 12.4 SECONDS (ref 11.7–14.5)
RBC # BLD AUTO: 4.63 X10(6)/MCL (ref 4.2–5.4)
SODIUM SERPL-SCNC: 141 MMOL/L (ref 136–145)
WBC # SPEC AUTO: 7.6 X10(3)/MCL (ref 4.5–11.5)

## 2024-02-05 PROCEDURE — 1160F RVW MEDS BY RX/DR IN RCRD: CPT | Mod: CPTII,S$GLB,, | Performed by: STUDENT IN AN ORGANIZED HEALTH CARE EDUCATION/TRAINING PROGRAM

## 2024-02-05 PROCEDURE — 1159F MED LIST DOCD IN RCRD: CPT | Mod: CPTII,S$GLB,, | Performed by: STUDENT IN AN ORGANIZED HEALTH CARE EDUCATION/TRAINING PROGRAM

## 2024-02-05 PROCEDURE — 3079F DIAST BP 80-89 MM HG: CPT | Mod: CPTII,S$GLB,, | Performed by: STUDENT IN AN ORGANIZED HEALTH CARE EDUCATION/TRAINING PROGRAM

## 2024-02-05 PROCEDURE — 36415 COLL VENOUS BLD VENIPUNCTURE: CPT

## 2024-02-05 PROCEDURE — 3074F SYST BP LT 130 MM HG: CPT | Mod: CPTII,S$GLB,, | Performed by: STUDENT IN AN ORGANIZED HEALTH CARE EDUCATION/TRAINING PROGRAM

## 2024-02-05 PROCEDURE — 80053 COMPREHEN METABOLIC PANEL: CPT

## 2024-02-05 PROCEDURE — 85025 COMPLETE CBC W/AUTO DIFF WBC: CPT

## 2024-02-05 PROCEDURE — 99214 OFFICE O/P EST MOD 30 MIN: CPT | Mod: S$GLB,,, | Performed by: STUDENT IN AN ORGANIZED HEALTH CARE EDUCATION/TRAINING PROGRAM

## 2024-02-05 PROCEDURE — 3008F BODY MASS INDEX DOCD: CPT | Mod: CPTII,S$GLB,, | Performed by: STUDENT IN AN ORGANIZED HEALTH CARE EDUCATION/TRAINING PROGRAM

## 2024-02-05 PROCEDURE — 85610 PROTHROMBIN TIME: CPT

## 2024-02-05 RX ORDER — QUETIAPINE FUMARATE 50 MG/1
50 TABLET, FILM COATED ORAL
COMMUNITY
Start: 2024-02-01

## 2024-02-05 RX ORDER — ALENDRONATE SODIUM 70 MG/1
70 TABLET ORAL
COMMUNITY

## 2024-02-05 RX ORDER — PANTOPRAZOLE SODIUM 40 MG/1
40 TABLET, DELAYED RELEASE ORAL EVERY MORNING
COMMUNITY

## 2024-02-05 RX ORDER — CARIPRAZINE 3 MG/1
3 CAPSULE, GELATIN COATED ORAL
COMMUNITY
Start: 2024-01-19

## 2024-02-05 NOTE — PROGRESS NOTES
[facility] Hepatology Follow Up Note    Referring provider: No ref. provider found  PCP: Meeks, Claude H., MD    Chief complaint:  Follow up elevated LFTs    HPI:  Tiffanie Gao is a 48 y.o. female with history of elevated LFTs who presents for scheduled follow up.    2/5/24:  She was last seen in April 2023. She reports ongoing abdominal pain as well as intermittent nausea.  She is otherwise without complaints today. No recent hospitalizations or ED visits. She denies signs of decompensated liver disease including no recent abdominal distension, encephalopathy, jaundice, GI bleeding.    4/14/23: She reports ongoing intermittent abdominal pain but is otherwise without complaints today. No recent hospitalizations or ED visits. She denies signs of decompensated liver disease including no recent abdominal distension, encephalopathy, jaundice, GI bleeding.    1/20/23:  She reports history intermittent abdominal pain but is otherwise without complaints today.  No recent hospitalizations or ED visits.  She canceled previous MRI/MRCP due to being sick and has not yet rescheduled. She denies signs of decompensated liver disease including no recent abdominal distension, encephalopathy, jaundice, GI bleeding.    8/18/22 Initial Visit: She reports undergoing cholecystectomy in 2011 with subsequent biliary complications requiring ERCP with stent placement.  This was further complicated by a recurrent cholangitis and bacteremia for which she underwent PTC with drain placement.  She says that in 2017 she required surgery at [facility] for biliary reconstruction and liver resection.    She says that since her first surgery she has had fluctuating though persistently elevated LFTs.  The most recent available labs from 01/2022 show T bili 2.1, alkaline phosphatase 572, AST 1411, .  Ultrasound in 05/2020 showed liver normal in size but minimally heterogeneous in echogenicity.      She is a previous social drinker but denies history  of heavy alcohol use.  She had a tattoo placed in 1990s.  She has never received a blood transfusion and denies history of IV drug use. She has no known family history of liver disease. She denies signs of decompensated liver disease including no recent abdominal distension, encephalopathy, jaundice, GI bleeding.    Past Medical History:   Diagnosis Date    Asthma     COPD (chronic obstructive pulmonary disease)     Liver disease     Mental disorder        Past Surgical History:   Procedure Laterality Date    Bile duct reconstruction      CHOLECYSTECTOMY      HYSTERECTOMY         Family History   Problem Relation Age of Onset    Heart disease Paternal Grandmother     Cancer Maternal Grandmother     Diabetes Maternal Grandmother     Heart disease Maternal Grandmother     Hypertension Father     Hypertension Mother        Social History     Tobacco Use    Smoking status: Never    Smokeless tobacco: Never   Substance Use Topics    Alcohol use: Not Currently    Drug use: Never       Current Outpatient Medications   Medication Sig Dispense Refill    albuterol (PROVENTIL/VENTOLIN HFA) 90 mcg/actuation inhaler Inhale into the lungs.      albuterol-ipratropium (DUO-NEB) 2.5 mg-0.5 mg/3 mL nebulizer solution Inhale 3 mLs into the lungs every 6 (six) hours as needed.      alendronate (FOSAMAX) 70 MG tablet Take 70 mg by mouth every 7 days.      ondansetron (ZOFRAN-ODT) 4 MG TbDL Take 4 mg by mouth every 6 (six) hours as needed.      pantoprazole (PROTONIX) 40 MG tablet Take 40 mg by mouth every morning.      QUEtiapine (SEROQUEL) 50 MG tablet Take 50 mg by mouth.      VRAYLAR 3 mg Cap Take 3 mg by mouth.      ALPRAZolam (XANAX) 0.5 MG tablet Take 0.5 mg by mouth daily as needed.      baclofen (LIORESAL) 10 MG tablet Take 10 mg by mouth.      famotidine (PEPCID) 40 MG tablet Take 40 mg by mouth.      lansoprazole (PREVACID) 30 MG capsule Take 30 mg by mouth.      LATUDA 40 mg Tab tablet Take 40 mg by mouth every evening.       "SYMBICORT 160-4.5 mcg/actuation HFAA Inhale into the lungs.       No current facility-administered medications for this visit.       Review of patient's allergies indicates:   Allergen Reactions    Sumatriptan Nausea And Vomiting     Other reaction(s): vomiting       Review of Systems   Constitutional:  Negative for fever and weight loss.   Cardiovascular:  Negative for leg swelling.   Gastrointestinal:  Positive for abdominal pain and nausea. Negative for blood in stool, constipation, diarrhea, heartburn, melena and vomiting.       Vitals:    02/05/24 1106   BP: 122/89   Pulse: 74   Resp: 18   SpO2: 98%   Weight: 47.6 kg (105 lb)   Height: 4' 9" (1.448 m)       Physical Exam  Constitutional:       General: She is not in acute distress.  Eyes:      General: No scleral icterus.  Cardiovascular:      Rate and Rhythm: Normal rate and regular rhythm.   Pulmonary:      Effort: Pulmonary effort is normal. No respiratory distress.   Abdominal:      General: Bowel sounds are normal. There is no distension.      Palpations: Abdomen is soft.      Tenderness: There is no abdominal tenderness. There is no guarding or rebound.   Musculoskeletal:      Right lower leg: No edema.      Left lower leg: No edema.   Skin:     Coloration: Skin is not jaundiced.         LABS: I personally reviewed pertinent laboratory findings.    Lab Results   Component Value Date    WBC 7.60 02/05/2024    HGB 13.9 02/05/2024    HCT 41.6 02/05/2024    MCV 89.8 02/05/2024     02/05/2024       Lab Results   Component Value Date     02/05/2024    K 3.6 02/05/2024    CO2 27 02/05/2024    BUN 12.1 02/05/2024    CREATININE 0.82 02/05/2024    CALCIUM 10.2 02/05/2024    EGFRNONAA >60 12/08/2019       Lab Results   Component Value Date    ALT 91 (H) 02/05/2024    AST 56 (H) 02/05/2024    ALKPHOS 323 (H) 02/05/2024    BILITOT 0.7 02/05/2024       Lab Results   Component Value Date    HEPAIGM Nonreactive 08/19/2022    HEPBCAB Nonreactive 08/19/2022    " HEPCAB Nonreactive 08/19/2022       Lab Results   Component Value Date    JEANNINE Negative 08/19/2022      Outside Labs 01/2022:  - Total bilirubin 2.1  - Alkaline phosphatase 572  - AST 1411  -     IMAGING: I personally reviewed imaging studies.    Assessment:  48 y.o. female with history of elevated LFTs who presents for follow up.     She reports history of elevated LFTs since undergoing cholecystectomy in 2011 with biliary complications requiring ERCP with stent placement and ultimately requiring biliary reconstruction and liver resection. Labs 01/2022 showed T bili 2.1, alkaline phosphatase 572, AST 1411, .  Ultrasound in 05/2020 showed liver normal in size but minimally heterogeneous in echogenicity. Serologic work up has been unrevealing.  MRI/MRCP 02/2023 reviewed in multidisciplinary liver radiology conference and showed chronic dilation of biliary tree with possible stricture in right hepatic lobe.  Recommendations were for ERCP.  She had significant improvement in her LFTs without intervention, so ERCP was not pursued at that time. However, LFTs have since plateaued.    1. Elevated LFTs        Recommendations:  - Given prior MRCP findings as well as persistently elevated LFTs, will arrange ERCP as well as EUS guided liver biopsy.    Labs every 3 months. Return 6 months or sooner if needed.    I spent a total of 30 minutes on the day of the visit. This includes face to face time and non-face to face time preparing to see the patient (eg, review of tests), obtaining and/or reviewing separately obtained history, documenting clinical information in the electronic or other health record, independently interpreting results, and communicating results to the patient/family/caregiver, or care coordination.    Ata Childers MD  Staff Physician  Hepatology and Liver Transplant  [facility]  [facility] [place]

## 2024-02-08 ENCOUNTER — TELEPHONE (OUTPATIENT)
Dept: HEPATOLOGY | Facility: CLINIC | Age: 49
End: 2024-02-08
Payer: MEDICAID

## 2024-02-08 NOTE — TELEPHONE ENCOUNTER
Recall placed and labs scheduled     ----- Message from Ata Childers MD sent at 2/8/2024  8:51 AM CST -----  Labs every 3 months. Return 6 months in [place]

## 2024-02-16 ENCOUNTER — TELEPHONE (OUTPATIENT)
Dept: HEPATOLOGY | Facility: CLINIC | Age: 49
End: 2024-02-16
Payer: MEDICAID

## 2024-02-16 NOTE — TELEPHONE ENCOUNTER
Spoke with patient.  She visited Dr Childers on 2/5/24.  Having severe pain for 3 days and today it is more severe.  Dr Childers wanted her have GI referral for ERCP and biopsy.  She said she did not get contacted by GI department  Patient instructed to go to the ER. She will go to [facility] hoping she will have ERCP.

## 2024-02-16 NOTE — TELEPHONE ENCOUNTER
----- Message from Irene Meadows MA sent at 2/16/2024  1:29 PM CST -----  Regarding: FW: Patient advice  Contact: Pt  [phone]  Please advise  ----- Message -----  From: Elissa Webber  Sent: 2/16/2024  11:44 AM CST  To: [facility] Staff  Subject: Patient advice                                               Caller: Tiffanie     Returning call to:  Office no name left on voice mail     Caller can be reached at:  [phone]    Nature of the call:  Returning missed call to discuss pain level and options

## 2024-05-08 ENCOUNTER — LAB VISIT (OUTPATIENT)
Dept: LAB | Facility: HOSPITAL | Age: 49
End: 2024-05-08
Attending: STUDENT IN AN ORGANIZED HEALTH CARE EDUCATION/TRAINING PROGRAM
Payer: MEDICAID

## 2024-05-08 DIAGNOSIS — R79.89 ELEVATED LFTS: ICD-10-CM

## 2024-05-08 LAB
ALBUMIN SERPL-MCNC: 3.6 G/DL (ref 3.5–5)
ALBUMIN/GLOB SERPL: 0.9 RATIO (ref 1.1–2)
ALP SERPL-CCNC: 393 UNIT/L (ref 40–150)
ALT SERPL-CCNC: 46 UNIT/L (ref 0–55)
AST SERPL-CCNC: 33 UNIT/L (ref 5–34)
BILIRUB SERPL-MCNC: 0.8 MG/DL
BUN SERPL-MCNC: 9.7 MG/DL (ref 9.8–20.1)
CALCIUM SERPL-MCNC: 9.7 MG/DL (ref 8.4–10.2)
CHLORIDE SERPL-SCNC: 106 MMOL/L (ref 98–107)
CO2 SERPL-SCNC: 29 MMOL/L (ref 22–29)
CREAT SERPL-MCNC: 0.79 MG/DL (ref 0.55–1.02)
GFR SERPLBLD CREATININE-BSD FMLA CKD-EPI: >60 ML/MIN/1.73/M2
GLOBULIN SER-MCNC: 4 GM/DL (ref 2.4–3.5)
GLUCOSE SERPL-MCNC: 106 MG/DL (ref 74–100)
POTASSIUM SERPL-SCNC: 4.1 MMOL/L (ref 3.5–5.1)
PROT SERPL-MCNC: 7.6 GM/DL (ref 6.4–8.3)
SODIUM SERPL-SCNC: 142 MMOL/L (ref 136–145)

## 2024-05-08 PROCEDURE — 80053 COMPREHEN METABOLIC PANEL: CPT

## 2024-05-08 PROCEDURE — 36415 COLL VENOUS BLD VENIPUNCTURE: CPT

## 2024-07-26 ENCOUNTER — TELEPHONE (OUTPATIENT)
Dept: HEPATOLOGY | Facility: CLINIC | Age: 49
End: 2024-07-26
Payer: MEDICAID

## 2024-07-26 NOTE — TELEPHONE ENCOUNTER
Spoke with patient.  She is having abd pain on and off x many years.   She wants to know if  she can take take Tylenol up to 2,000 mg a day.  Patient needs f/u and labs in August in [place].(Recall)

## 2024-07-26 NOTE — TELEPHONE ENCOUNTER
Reached out to pt in regards to scheduling. Pt vu and all appts scheduled. All questions answered

## 2024-07-26 NOTE — TELEPHONE ENCOUNTER
----- Message from Irene Meadows MA sent at 7/26/2024 10:21 AM CDT -----  Regarding: FW: Rx Request  Contact: [phone]  Please advise  ----- Message -----  From: Maria Dolores Corona  Sent: 7/26/2024   9:35 AM CDT  To: [facility] Staff  Subject: Rx Request                                       CONSULT/ADVISORY    Name of Caller:  JOSIAH FOWLER [[id number]]    Contact Preference:  [phone] (home)       Nature of Call:  Pt is requesting a rx be called in for abdominal pain.        [facility] - [facility] [place], [place] - [address]  [address]  [facility] [address]  Phone: [phone] Fax: [phone]

## 2024-09-18 ENCOUNTER — TELEPHONE (OUTPATIENT)
Dept: HEPATOLOGY | Facility: CLINIC | Age: 49
End: 2024-09-18
Payer: MEDICAID

## 2024-09-18 NOTE — TELEPHONE ENCOUNTER
Returned pts call. Pt did not answer, left vm for pt to give the office a call back   ----- Message from Elissa Webber sent at 9/18/2024  9:29 AM CDT -----  Regarding: Appt  Contact: Pt  [phone]            Current Appt date: 11/11/24     Type of Appt:  Hospital follow      Physician:  Ata Childers MD      Reason for rescheduling: Would like sooner appt for Hospital follow up      Caller: [facility]       Contact Preference: Requesting sooner appt due to current levels

## 2024-11-11 ENCOUNTER — OFFICE VISIT (OUTPATIENT)
Dept: HEPATOLOGY | Facility: CLINIC | Age: 49
End: 2024-11-11
Payer: MEDICAID

## 2024-11-11 VITALS
BODY MASS INDEX: 21.96 KG/M2 | SYSTOLIC BLOOD PRESSURE: 106 MMHG | HEIGHT: 57 IN | HEART RATE: 87 BPM | WEIGHT: 101.81 LBS | DIASTOLIC BLOOD PRESSURE: 78 MMHG

## 2024-11-11 DIAGNOSIS — R74.01 ELEVATED TRANSAMINASE LEVEL: ICD-10-CM

## 2024-11-11 DIAGNOSIS — R74.8 ELEVATED ALKALINE PHOSPHATASE LEVEL: Primary | ICD-10-CM

## 2024-11-11 PROCEDURE — 1159F MED LIST DOCD IN RCRD: CPT | Mod: CPTII,S$GLB,, | Performed by: STUDENT IN AN ORGANIZED HEALTH CARE EDUCATION/TRAINING PROGRAM

## 2024-11-11 PROCEDURE — 3074F SYST BP LT 130 MM HG: CPT | Mod: CPTII,S$GLB,, | Performed by: STUDENT IN AN ORGANIZED HEALTH CARE EDUCATION/TRAINING PROGRAM

## 2024-11-11 PROCEDURE — 99214 OFFICE O/P EST MOD 30 MIN: CPT | Mod: S$GLB,,, | Performed by: STUDENT IN AN ORGANIZED HEALTH CARE EDUCATION/TRAINING PROGRAM

## 2024-11-11 PROCEDURE — 3078F DIAST BP <80 MM HG: CPT | Mod: CPTII,S$GLB,, | Performed by: STUDENT IN AN ORGANIZED HEALTH CARE EDUCATION/TRAINING PROGRAM

## 2024-11-11 PROCEDURE — 1160F RVW MEDS BY RX/DR IN RCRD: CPT | Mod: CPTII,S$GLB,, | Performed by: STUDENT IN AN ORGANIZED HEALTH CARE EDUCATION/TRAINING PROGRAM

## 2024-11-11 PROCEDURE — 3008F BODY MASS INDEX DOCD: CPT | Mod: CPTII,S$GLB,, | Performed by: STUDENT IN AN ORGANIZED HEALTH CARE EDUCATION/TRAINING PROGRAM

## 2024-11-11 RX ORDER — HYDROXYZINE HYDROCHLORIDE 50 MG/1
50 TABLET, FILM COATED ORAL DAILY PRN
COMMUNITY

## 2024-11-11 RX ORDER — TRAZODONE HYDROCHLORIDE 100 MG/1
100 TABLET ORAL NIGHTLY
COMMUNITY

## 2024-11-11 RX ORDER — MEGESTROL ACETATE 40 MG/ML
200 SUSPENSION ORAL DAILY
COMMUNITY

## 2024-11-11 RX ORDER — URSODIOL 300 MG/1
300 CAPSULE ORAL 2 TIMES DAILY
Qty: 60 CAPSULE | Refills: 11 | Status: SHIPPED | OUTPATIENT
Start: 2024-11-11 | End: 2025-11-11

## 2024-11-11 NOTE — PROGRESS NOTES
[facility] Hepatology Follow Up Note    Referring provider: No ref. provider found  PCP: Meeks, Claude H., MD    Chief complaint:  Follow up elevated LFTs    HPI:  Tiffanie Gao is a 49 y.o. female with history of elevated LFTs who presents for scheduled follow up.    11/11/24:  She was had multiple hospitalizations and ED visits for worsening of her chronic abdominal pain.  Symptoms were felt to be unlikely due to cholangitis, and she has been treated symptomatically.  She reports ongoing abdominal pain today as well as intermittent nausea.  She is otherwise without complaints. She denies signs of decompensated liver disease including no recent abdominal distension, encephalopathy, jaundice, GI bleeding.    2/5/24:  She was last seen in April 2023. She reports ongoing abdominal pain as well as intermittent nausea.  She is otherwise without complaints today. No recent hospitalizations or ED visits. She denies signs of decompensated liver disease including no recent abdominal distension, encephalopathy, jaundice, GI bleeding.    4/14/23: She reports ongoing intermittent abdominal pain but is otherwise without complaints today. No recent hospitalizations or ED visits. She denies signs of decompensated liver disease including no recent abdominal distension, encephalopathy, jaundice, GI bleeding.    1/20/23:  She reports history intermittent abdominal pain but is otherwise without complaints today.  No recent hospitalizations or ED visits.  She canceled previous MRI/MRCP due to being sick and has not yet rescheduled. She denies signs of decompensated liver disease including no recent abdominal distension, encephalopathy, jaundice, GI bleeding.    8/18/22 Initial Visit: She reports undergoing cholecystectomy in 2011 with subsequent biliary complications requiring ERCP with stent placement.  This was further complicated by a recurrent cholangitis and bacteremia for which she underwent PTC with drain placement.  She says  that in 2017 she required surgery at [facility] for biliary reconstruction and liver resection.    She says that since her first surgery she has had fluctuating though persistently elevated LFTs.  The most recent available labs from 01/2022 show T bili 2.1, alkaline phosphatase 572, AST 1411, .  Ultrasound in 05/2020 showed liver normal in size but minimally heterogeneous in echogenicity.      She is a previous social drinker but denies history of heavy alcohol use.  She had a tattoo placed in 1990s.  She has never received a blood transfusion and denies history of IV drug use. She has no known family history of liver disease. She denies signs of decompensated liver disease including no recent abdominal distension, encephalopathy, jaundice, GI bleeding.    Past Medical History:   Diagnosis Date    Asthma     COPD (chronic obstructive pulmonary disease)     Liver disease     Mental disorder        Past Surgical History:   Procedure Laterality Date    Bile duct reconstruction      CHOLECYSTECTOMY      HYSTERECTOMY         Family History   Problem Relation Name Age of Onset    Heart disease Paternal Grandmother      Cancer Maternal Grandmother      Diabetes Maternal Grandmother      Heart disease Maternal Grandmother      Hypertension Father      Hypertension Mother         Social History     Tobacco Use    Smoking status: Never    Smokeless tobacco: Never   Substance Use Topics    Alcohol use: Not Currently    Drug use: Never       Current Outpatient Medications   Medication Sig Dispense Refill    albuterol (PROVENTIL/VENTOLIN HFA) 90 mcg/actuation inhaler Inhale into the lungs.      albuterol-ipratropium (DUO-NEB) 2.5 mg-0.5 mg/3 mL nebulizer solution Inhale 3 mLs into the lungs every 6 (six) hours as needed.      ALPRAZolam (XANAX) 0.5 MG tablet Take 0.5 mg by mouth daily as needed.      hydrOXYzine (ATARAX) 50 MG tablet Take 50 mg by mouth daily as needed.      megestroL (MEGACE) 400 mg/10 mL (40 mg/mL) Susp  "Take 200 mg by mouth once daily.      ondansetron (ZOFRAN-ODT) 4 MG TbDL Take 4 mg by mouth every 6 (six) hours as needed.      pantoprazole (PROTONIX) 40 MG tablet Take 40 mg by mouth every morning.      traZODone (DESYREL) 100 MG tablet Take 100 mg by mouth every evening.      alendronate (FOSAMAX) 70 MG tablet Take 70 mg by mouth every 7 days. (Patient not taking: Reported on 11/11/2024)      baclofen (LIORESAL) 10 MG tablet Take 10 mg by mouth. (Patient not taking: Reported on 11/11/2024)      famotidine (PEPCID) 40 MG tablet Take 40 mg by mouth. (Patient not taking: Reported on 11/11/2024)      lansoprazole (PREVACID) 30 MG capsule Take 30 mg by mouth. (Patient not taking: Reported on 11/11/2024)      LATUDA 40 mg Tab tablet Take 40 mg by mouth every evening. (Patient not taking: Reported on 11/11/2024)      QUEtiapine (SEROQUEL) 50 MG tablet Take 50 mg by mouth. (Patient not taking: Reported on 11/11/2024)      SYMBICORT 160-4.5 mcg/actuation HFAA Inhale into the lungs. (Patient not taking: Reported on 11/11/2024)      VRAYLAR 3 mg Cap Take 3 mg by mouth. (Patient not taking: Reported on 11/11/2024)       No current facility-administered medications for this visit.       Review of patient's allergies indicates:   Allergen Reactions    Sumatriptan Nausea And Vomiting     Other reaction(s): vomiting       Review of Systems   Constitutional:  Negative for fever and weight loss.   Cardiovascular:  Negative for leg swelling.   Gastrointestinal:  Positive for abdominal pain and nausea. Negative for blood in stool, constipation, diarrhea, heartburn, melena and vomiting.       Vitals:    11/11/24 1005   BP: 106/78   Pulse: 87   Weight: 46.2 kg (101 lb 12.8 oz)   Height: 4' 9" (1.448 m)       Physical Exam  Constitutional:       General: She is not in acute distress.  Eyes:      General: No scleral icterus.  Cardiovascular:      Rate and Rhythm: Normal rate and regular rhythm.   Pulmonary:      Effort: Pulmonary effort " is normal. No respiratory distress.   Abdominal:      General: Bowel sounds are normal. There is no distension.      Palpations: Abdomen is soft.      Tenderness: There is no abdominal tenderness. There is no guarding or rebound.   Musculoskeletal:      Right lower leg: No edema.      Left lower leg: No edema.   Skin:     Coloration: Skin is not jaundiced.       LABS: I personally reviewed pertinent laboratory findings.    Lab Results   Component Value Date    WBC 7.60 02/05/2024    HGB 13.9 02/05/2024    HCT 41.6 02/05/2024    MCV 89.8 02/05/2024     02/05/2024       Lab Results   Component Value Date     08/08/2024    K 3.9 08/08/2024     08/08/2024    CO2 22 08/08/2024    BUN 10 08/08/2024    CREATININE 0.67 08/08/2024    CALCIUM 8.4 (L) 08/08/2024    ANIONGAP 7 (L) 08/08/2024    ESTGFRAFRICA 107 08/08/2024    EGFRNONAA >60 12/08/2019       Lab Results   Component Value Date    ALT 46 05/08/2024    AST 33 05/08/2024    ALKPHOS 393 (H) 05/08/2024    BILITOT 0.8 05/08/2024       Lab Results   Component Value Date    HEPAIGM Nonreactive 08/19/2022    HEPBIGM Nonreactive 08/19/2022    HEPBCAB Nonreactive 08/19/2022    HEPCAB Nonreactive 08/19/2022       Lab Results   Component Value Date    JEANNINE Negative 08/19/2022      Outside Labs 01/2022:  - Total bilirubin 2.1  - Alkaline phosphatase 572  - AST 1411  -     IMAGING: I personally reviewed imaging studies.    Assessment:  49 y.o. female with history of elevated LFTs who presents for follow up.     She reports history of elevated LFTs since undergoing cholecystectomy in 2011 with biliary complications requiring ERCP with stent placement and ultimately requiring biliary reconstruction and liver resection. Liver biopsy 08/2019 showed minimal portal inflammation and mild portal fibrosis. Labs 01/2022 showed T bili 2.1, alkaline phosphatase 572, AST 1411, .  Ultrasound in 05/2020 showed liver normal in size but minimally heterogeneous in  echogenicity. Serologic work up has been unrevealing.  MRI/MRCP 02/2023 reviewed in multidisciplinary liver radiology conference and showed chronic dilation of biliary tree with possible stricture in right hepatic lobe.  Recommendations were for ERCP.  She had significant improvement in her LFTs without intervention, so ERCP was not pursued at that time.     She was hospitalized in 08/2024 with what sound like cholangitis treated with antibiotics. Given this, she would benefit from biliary intervention. Her surgical anatomy is unclear. Will need to clarify to determine if she is able to have ERCP or needs PTC.      1. Elevated alkaline phosphatase level    2. Elevated transaminase level          Recommendations:  - Will request outside operative report to determine surgical anatomy  - Depending on anatomy, will refer to GI for ERCP vs IR for PTC.  - ER precautions given for fever, abdominal pain, jaundice, or other symptoms concerning for cholangitis.    Return 6 months or sooner if needed.    I spent a total of 30 minutes on the day of the visit. This includes face to face time and non-face to face time preparing to see the patient (eg, review of tests), obtaining and/or reviewing separately obtained history, documenting clinical information in the electronic or other health record, independently interpreting results, and communicating results to the patient/family/caregiver, or care coordination.    Ata Childers MD  Staff Physician  Hepatology and Liver Transplant  [facility]  [facility]

## 2024-11-13 ENCOUNTER — TELEPHONE (OUTPATIENT)
Dept: HEPATOLOGY | Facility: CLINIC | Age: 49
End: 2024-11-13
Payer: MEDICAID

## 2024-11-13 NOTE — TELEPHONE ENCOUNTER
Recall placed  ----- Message from Ata Childers MD sent at 11/13/2024  3:32 PM CST -----  Return in [place] 6 months with labs

## 2024-11-13 NOTE — TELEPHONE ENCOUNTER
Release of information faxed to [facility] at [phone].  Need Surgical/Op Report from Liver and Bile Duct Surgery done at [facility] around 2017.  Received receipt of confirmation fax received.

## 2024-11-13 NOTE — TELEPHONE ENCOUNTER
----- Message from Ata Childers MD sent at 11/13/2024 10:12 AM CST -----  Evert RamirezSnow,    She had surgery on her liver and bile ducts at [facility] around 2017. Can you please see if you can get the operative report faxed to us?    Thanks,  Ata

## 2024-12-10 ENCOUNTER — TELEPHONE (OUTPATIENT)
Dept: HEPATOLOGY | Facility: CLINIC | Age: 49
End: 2024-12-10
Payer: MEDICAID

## 2024-12-10 NOTE — TELEPHONE ENCOUNTER
"Returned pts call. Discussed w/ pt that as of now we have not received any records. Pt vu and no further questions   ----- Message from Tha sent at 12/10/2024  8:48 AM CST -----  Consult/Advisory    Name Of Caller: Self    Contact Preference?: [phone] (home)     Provider Name: Alvarado    What is the nature of the call?: Requesting clarification if the office acquired her medical records from [facility] yet    Additional Notes:  "Thank you for all that you do for our patients"  "

## 2025-01-07 ENCOUNTER — TELEPHONE (OUTPATIENT)
Dept: HEPATOLOGY | Facility: CLINIC | Age: 50
End: 2025-01-07
Payer: MEDICAID

## 2025-01-07 NOTE — TELEPHONE ENCOUNTER
Returned pts call. Pt is requesting to know what her next steps are and if we received any records form [facility]. Informed pt I would send a message to nurse and provider for review and advise. Pt vu and no further questions   ----- Message from Vivek sent at 1/7/2025 11:25 AM CST -----  Regarding: Consult/Advisory  Contact: [phone]  Consult/Advisory     Name Of Caller: Pt         Contact Preference:  [phone]     Nature of call: Pt is following up on testing that is set to be scheduled, and to discuss a plan of care moving forward. Please call to advise thank you

## 2025-01-07 NOTE — TELEPHONE ENCOUNTER
Call returned to the patient at [phone].    Message from Dr SIERRA Childers, Need outside operative report from [facility] - Liver and Bile Duct Surg around 2017 to determine surgical anatomy for further advance endoscopy testing.    Patient informed we have not received any information. Will resend fax to [facility] Department  Ph [phone] and Fax # [phone].  Received receipt of confirmation for new call and fax #, fax received.

## 2025-01-13 ENCOUNTER — TELEPHONE (OUTPATIENT)
Dept: HEPATOLOGY | Facility: CLINIC | Age: 50
End: 2025-01-13
Payer: MEDICAID

## 2025-01-13 NOTE — TELEPHONE ENCOUNTER
Received fax from [facility] with the Liver and Bile Duct Surgical Report from 6/26/2017.  Pathology Report included.    Records scanned into Media and forwarded to Dr Childers.

## 2025-01-16 ENCOUNTER — TELEPHONE (OUTPATIENT)
Dept: HEPATOLOGY | Facility: CLINIC | Age: 50
End: 2025-01-16
Payer: MEDICAID

## 2025-01-16 ENCOUNTER — TELEPHONE (OUTPATIENT)
Facility: CLINIC | Age: 50
End: 2025-01-16
Payer: MEDICAID

## 2025-01-16 NOTE — TELEPHONE ENCOUNTER
Call returned to the Patient at [phone].   Patient stated someone called me from a [phone] number.  I thought it maybe was from Dr Childers's Office?  Patient informed we did not reach out to her.  Voiced understanding.

## 2025-01-16 NOTE — TELEPHONE ENCOUNTER
"----- Message from Med Assistant Coy sent at 1/16/2025 11:01 AM CST -----  Pt is requesting to spk with the nurse.  ----- Message -----  From: Tha Mirza  Sent: 1/16/2025  10:53 AM CST  To: Alvarado LY Staff    Consult/Advisory    Name Of Caller: Self    Contact Preference?: [phone]     What is the nature of the call?: Returning call to office      Additional Notes: Pt stating she wasn't sure if office tried contacting her or not    "Thank you for all that you do for our patients"  "

## 2025-01-16 NOTE — TELEPHONE ENCOUNTER
"Pt was calling to spk with Nurse ROSARIO Muñiz. Message sent to her.    Anneliese    ----- Message from Tha sent at 1/16/2025 10:52 AM CST -----  Consult/Advisory    Name Of Caller: Self    Contact Preference?: [phone]     What is the nature of the call?: Returning call to office      Additional Notes: Pt stating she wasn't sure if office tried contacting her or not    "Thank you for all that you do for our patients"  "

## 2025-01-29 ENCOUNTER — TELEPHONE (OUTPATIENT)
Dept: HEPATOLOGY | Facility: CLINIC | Age: 50
End: 2025-01-29
Payer: MEDICAID

## 2025-01-29 NOTE — TELEPHONE ENCOUNTER
----- Message from Med Assistant Coy sent at 1/29/2025 11:10 AM CST -----  Regarding: FW: status update  Contact: Patient can be contacted @#  [phone]    ----- Message -----  From: Jody Dupont  Sent: 1/29/2025  11:01 AM CST  To: Alvarado LY Staff  Subject: status update                                    PT called states she would like to speak to Nurse Snow regarding to get an update of her status. Please call to advise    Patient can be contacted @# [phone]

## 2025-01-29 NOTE — TELEPHONE ENCOUNTER
Call returned to the patient.  Patient stated I just wanted to know if any new changes or test were ordered?    Patient informed no new testing at this time.  We did receive the records from [facility].  We are still on for you 6 months follow up in May ([place]) with Labs before the visit.    Patient stated I did labs with my PCP the other day.  How did you do?  I don't know yet I go to the Dr on 2/10/25.  I hope you get a good report.  Keep us updated on how you are doing.  Pt voiced understanding.

## 2025-03-21 ENCOUNTER — TELEPHONE (OUTPATIENT)
Dept: HEPATOLOGY | Facility: CLINIC | Age: 50
End: 2025-03-21
Payer: MEDICAID

## 2025-03-21 DIAGNOSIS — R79.89 ELEVATED LFTS: Primary | ICD-10-CM

## 2025-03-21 NOTE — TELEPHONE ENCOUNTER
----- Message from Med Assistant Ochoa sent at 3/21/2025 11:32 AM CDT -----  Regarding: FW: Consult/Advisory  Contact: Tiffanie Gao  Please advise, thank bonnie  ----- Message -----  From: Johnna Cruz  Sent: 3/21/2025  11:28 AM CDT  To: Alvarado LY Staff  Subject: Consult/Advisory                                  Consult/Advisory Name Of Caller:Tiffanie Gao   Contact Preference:[phone] (home)   Nature of call:Patient is calling to speak to Edilia to check her status. Requesting a call back

## 2025-03-21 NOTE — TELEPHONE ENCOUNTER
Call returned to the Patient at [phone].  Stated she received a letter to return in May.    No available appt in May or June in [place]. Schedule not open for July.  Patient was able to log in on her My Chart Portal.    Follow Up Labs scheduled for Fri 5/2/25 in [place]. Pt is aware of the location.    Follow Up Virtual Office Visit will be on Fri 5/9/25 at 11 am.  Patient stated she was able to see the appts.  Voiced understanding.  Appt reminders placed in the mail.

## 2025-05-02 ENCOUNTER — LAB VISIT (OUTPATIENT)
Dept: LAB | Facility: HOSPITAL | Age: 50
End: 2025-05-02
Attending: STUDENT IN AN ORGANIZED HEALTH CARE EDUCATION/TRAINING PROGRAM
Payer: MEDICAID

## 2025-05-02 DIAGNOSIS — R79.89 ELEVATED LFTS: ICD-10-CM

## 2025-05-02 LAB
ALBUMIN SERPL-MCNC: 3.6 G/DL (ref 3.5–5)
ALBUMIN/GLOB SERPL: 1 RATIO (ref 1.1–2)
ALP SERPL-CCNC: 514 UNIT/L (ref 40–150)
ALT SERPL-CCNC: 135 UNIT/L (ref 0–55)
ANION GAP SERPL CALC-SCNC: 9 MEQ/L
AST SERPL-CCNC: 117 UNIT/L (ref 11–45)
BASOPHILS # BLD AUTO: 0.06 X10(3)/MCL
BASOPHILS NFR BLD AUTO: 1 %
BILIRUB SERPL-MCNC: 0.7 MG/DL
BUN SERPL-MCNC: 15.6 MG/DL (ref 9.8–20.1)
CALCIUM SERPL-MCNC: 9.7 MG/DL (ref 8.4–10.2)
CHLORIDE SERPL-SCNC: 106 MMOL/L (ref 98–107)
CO2 SERPL-SCNC: 28 MMOL/L (ref 22–29)
CREAT SERPL-MCNC: 0.8 MG/DL (ref 0.55–1.02)
CREAT/UREA NIT SERPL: 20
EOSINOPHIL # BLD AUTO: 0.13 X10(3)/MCL (ref 0–0.9)
EOSINOPHIL NFR BLD AUTO: 2.2 %
ERYTHROCYTE [DISTWIDTH] IN BLOOD BY AUTOMATED COUNT: 14.1 % (ref 11.5–17)
GFR SERPLBLD CREATININE-BSD FMLA CKD-EPI: >60 ML/MIN/1.73/M2
GLOBULIN SER-MCNC: 3.6 GM/DL (ref 2.4–3.5)
GLUCOSE SERPL-MCNC: 50 MG/DL (ref 74–100)
HCT VFR BLD AUTO: 41.1 % (ref 37–47)
HGB BLD-MCNC: 13.4 G/DL (ref 12–16)
IMM GRANULOCYTES # BLD AUTO: 0.01 X10(3)/MCL (ref 0–0.04)
IMM GRANULOCYTES NFR BLD AUTO: 0.2 %
INR PPP: 0.9 (ref 2–3)
LYMPHOCYTES # BLD AUTO: 1.48 X10(3)/MCL (ref 0.6–4.6)
LYMPHOCYTES NFR BLD AUTO: 25 %
MCH RBC QN AUTO: 28.8 PG (ref 27–31)
MCHC RBC AUTO-ENTMCNC: 32.6 G/DL (ref 33–36)
MCV RBC AUTO: 88.4 FL (ref 80–94)
MONOCYTES # BLD AUTO: 0.43 X10(3)/MCL (ref 0.1–1.3)
MONOCYTES NFR BLD AUTO: 7.3 %
NEUTROPHILS # BLD AUTO: 3.8 X10(3)/MCL (ref 2.1–9.2)
NEUTROPHILS NFR BLD AUTO: 64.3 %
NRBC BLD AUTO-RTO: 0 %
PLATELET # BLD AUTO: 248 X10(3)/MCL (ref 130–400)
PMV BLD AUTO: 10.4 FL (ref 7.4–10.4)
POTASSIUM SERPL-SCNC: 4 MMOL/L (ref 3.5–5.1)
PROT SERPL-MCNC: 7.2 GM/DL (ref 6.4–8.3)
PROTHROMBIN TIME: 12.5 SECONDS (ref 11.7–14.5)
RBC # BLD AUTO: 4.65 X10(6)/MCL (ref 4.2–5.4)
SODIUM SERPL-SCNC: 143 MMOL/L (ref 136–145)
WBC # BLD AUTO: 5.91 X10(3)/MCL (ref 4.5–11.5)

## 2025-05-02 PROCEDURE — 85025 COMPLETE CBC W/AUTO DIFF WBC: CPT

## 2025-05-02 PROCEDURE — 85610 PROTHROMBIN TIME: CPT

## 2025-05-02 PROCEDURE — 80053 COMPREHEN METABOLIC PANEL: CPT

## 2025-05-02 PROCEDURE — 36415 COLL VENOUS BLD VENIPUNCTURE: CPT

## 2025-05-09 ENCOUNTER — OFFICE VISIT (OUTPATIENT)
Dept: HEPATOLOGY | Facility: CLINIC | Age: 50
End: 2025-05-09
Payer: MEDICAID

## 2025-05-09 DIAGNOSIS — R79.89 ELEVATED LFTS: Primary | ICD-10-CM

## 2025-05-09 PROCEDURE — 98005 SYNCH AUDIO-VIDEO EST LOW 20: CPT | Mod: 95,,, | Performed by: STUDENT IN AN ORGANIZED HEALTH CARE EDUCATION/TRAINING PROGRAM

## 2025-05-09 PROCEDURE — 1159F MED LIST DOCD IN RCRD: CPT | Mod: CPTII,95,, | Performed by: STUDENT IN AN ORGANIZED HEALTH CARE EDUCATION/TRAINING PROGRAM

## 2025-05-09 PROCEDURE — 1160F RVW MEDS BY RX/DR IN RCRD: CPT | Mod: CPTII,95,, | Performed by: STUDENT IN AN ORGANIZED HEALTH CARE EDUCATION/TRAINING PROGRAM

## 2025-05-09 NOTE — PROGRESS NOTES
Hepatology Follow Up Note    The patient location is: [place]  The chief complaint leading to consultation is: elevated LFTs    Visit type: audiovisual    Face to Face time with patient: 10  20 minutes of total time spent on the encounter, which includes face to face time and non-face to face time preparing to see the patient (eg, review of tests), Obtaining and/or reviewing separately obtained history, Documenting clinical information in the electronic or other health record, Independently interpreting results (not separately reported) and communicating results to the patient/family/caregiver, or Care coordination (not separately reported).     Each patient to whom he or she provides medical services by telemedicine is:  (1) informed of the relationship between the physician and patient and the respective role of any other health care provider with respect to management of the patient; and (2) notified that he or she may decline to receive medical services by telemedicine and may withdraw from such care at any time.  Referring provider: No ref. provider found  PCP: Meeks, Claude H., MD    Chief complaint:  Follow up elevated LFTs    HPI:  Tiffanie Gao is a 49 y.o. female with history of elevated LFTs who presents for follow up.    5/9/25:  She reports chronic abdominal pain and nausea but is otherwise without complaints today.  She presented to ED in November 2024 with worsening of her abdominal pain.  CT was without acute changes.  No other recent hospitalizations or ED visits. She denies signs of decompensated liver disease including no recent abdominal distension, encephalopathy, jaundice, GI bleeding.    11/11/24:  She was had multiple hospitalizations and ED visits for worsening of her chronic abdominal pain.  Symptoms were felt to be unlikely due to cholangitis, and she has been treated symptomatically.  She reports ongoing abdominal pain today as well as intermittent nausea.  She is otherwise without  complaints. She denies signs of decompensated liver disease including no recent abdominal distension, encephalopathy, jaundice, GI bleeding.    2/5/24:  She was last seen in April 2023. She reports ongoing abdominal pain as well as intermittent nausea.  She is otherwise without complaints today. No recent hospitalizations or ED visits. She denies signs of decompensated liver disease including no recent abdominal distension, encephalopathy, jaundice, GI bleeding.    4/14/23: She reports ongoing intermittent abdominal pain but is otherwise without complaints today. No recent hospitalizations or ED visits. She denies signs of decompensated liver disease including no recent abdominal distension, encephalopathy, jaundice, GI bleeding.    1/20/23:  She reports history intermittent abdominal pain but is otherwise without complaints today.  No recent hospitalizations or ED visits.  She canceled previous MRI/MRCP due to being sick and has not yet rescheduled. She denies signs of decompensated liver disease including no recent abdominal distension, encephalopathy, jaundice, GI bleeding.    8/18/22 Initial Visit: She reports undergoing cholecystectomy in 2011 with subsequent biliary complications requiring ERCP with stent placement.  This was further complicated by a recurrent cholangitis and bacteremia for which she underwent PTC with drain placement.  She says that in 2017 she required surgery at [facility] for biliary reconstruction and liver resection.    She says that since her first surgery she has had fluctuating though persistently elevated LFTs.  The most recent available labs from 01/2022 show T bili 2.1, alkaline phosphatase 572, AST 1411, .  Ultrasound in 05/2020 showed liver normal in size but minimally heterogeneous in echogenicity.      She is a previous social drinker but denies history of heavy alcohol use.  She had a tattoo placed in 1990s.  She has never received a blood transfusion and denies history of  IV drug use. She has no known family history of liver disease. She denies signs of decompensated liver disease including no recent abdominal distension, encephalopathy, jaundice, GI bleeding.    Past Medical History:   Diagnosis Date    Asthma     COPD (chronic obstructive pulmonary disease)     Liver disease     Mental disorder        Past Surgical History:   Procedure Laterality Date    Bile duct reconstruction      CHOLECYSTECTOMY      HYSTERECTOMY         Family History   Problem Relation Name Age of Onset    Heart disease Paternal Grandmother      Cancer Maternal Grandmother      Diabetes Maternal Grandmother      Heart disease Maternal Grandmother      Hypertension Father      Hypertension Mother         Social History     Tobacco Use    Smoking status: Never    Smokeless tobacco: Never   Substance Use Topics    Alcohol use: Not Currently    Drug use: Never       Current Outpatient Medications   Medication Sig Dispense Refill    albuterol (PROVENTIL/VENTOLIN HFA) 90 mcg/actuation inhaler Inhale into the lungs.      albuterol-ipratropium (DUO-NEB) 2.5 mg-0.5 mg/3 mL nebulizer solution Inhale 3 mLs into the lungs every 6 (six) hours as needed.      alendronate (FOSAMAX) 70 MG tablet Take 70 mg by mouth every 7 days. (Patient not taking: Reported on 11/11/2024)      ALPRAZolam (XANAX) 0.5 MG tablet Take 0.5 mg by mouth daily as needed.      baclofen (LIORESAL) 10 MG tablet Take 10 mg by mouth. (Patient not taking: Reported on 11/11/2024)      famotidine (PEPCID) 40 MG tablet Take 40 mg by mouth. (Patient not taking: Reported on 11/11/2024)      hydrOXYzine (ATARAX) 50 MG tablet Take 50 mg by mouth daily as needed.      lansoprazole (PREVACID) 30 MG capsule Take 30 mg by mouth. (Patient not taking: Reported on 11/11/2024)      LATUDA 40 mg Tab tablet Take 40 mg by mouth every evening. (Patient not taking: Reported on 11/11/2024)      megestroL (MEGACE) 400 mg/10 mL (40 mg/mL) Susp Take 200 mg by mouth once daily.       ondansetron (ZOFRAN-ODT) 4 MG TbDL Take 4 mg by mouth every 6 (six) hours as needed.      pantoprazole (PROTONIX) 40 MG tablet Take 40 mg by mouth every morning.      QUEtiapine (SEROQUEL) 50 MG tablet Take 50 mg by mouth. (Patient not taking: Reported on 11/11/2024)      SYMBICORT 160-4.5 mcg/actuation HFAA Inhale into the lungs. (Patient not taking: Reported on 11/11/2024)      traZODone (DESYREL) 100 MG tablet Take 100 mg by mouth every evening.      ursodioL (ACTIGALL) 300 mg capsule Take 1 capsule (300 mg total) by mouth 2 (two) times daily. 60 capsule 11    VRAYLAR 3 mg Cap Take 3 mg by mouth. (Patient not taking: Reported on 11/11/2024)       No current facility-administered medications for this visit.       Review of patient's allergies indicates:   Allergen Reactions    Sumatriptan Nausea And Vomiting     Other reaction(s): vomiting       Review of Systems   Constitutional:  Negative for fever and weight loss.   Cardiovascular:  Negative for leg swelling.   Gastrointestinal:  Positive for abdominal pain and nausea. Negative for blood in stool, constipation, diarrhea, heartburn, melena and vomiting.       There were no vitals filed for this visit.      Physical Exam  Constitutional:       General: She is not in acute distress.     Appearance: She is not ill-appearing.   HENT:      Head: Normocephalic and atraumatic.   Eyes:      General: No scleral icterus.     Extraocular Movements: Extraocular movements intact.   Pulmonary:      Effort: No respiratory distress.   Skin:     Coloration: Skin is not jaundiced.   Neurological:      Mental Status: She is alert.         LABS: I personally reviewed pertinent laboratory findings.    Lab Results   Component Value Date    WBC 5.91 05/02/2025    HGB 13.4 05/02/2025    HCT 41.1 05/02/2025    MCV 88.4 05/02/2025     05/02/2025       Lab Results   Component Value Date     05/02/2025    K 4.0 05/02/2025     05/02/2025    CO2 28 05/02/2025    BUN  15.6 05/02/2025    CREATININE 0.80 05/02/2025    CALCIUM 9.7 05/02/2025    ANIONGAP 7 (L) 08/08/2024    ESTGFRAFRICA 107 08/08/2024    EGFRNONAA >60 12/08/2019       Lab Results   Component Value Date     (H) 05/02/2025     (H) 05/02/2025    ALKPHOS 514 (H) 05/02/2025    BILITOT 0.7 05/02/2025       Lab Results   Component Value Date    HEPAIGM Nonreactive 08/19/2022    HEPBIGM Nonreactive 08/19/2022    HEPBCAB Nonreactive 08/19/2022    HEPCAB Nonreactive 08/19/2022       Lab Results   Component Value Date    JEANNINE Negative 08/19/2022      Outside Labs 01/2022:  - Total bilirubin 2.1  - Alkaline phosphatase 572  - AST 1411  -     IMAGING: I personally reviewed imaging studies.    Assessment:  49 y.o. female with history of elevated LFTs who presents for follow up.     She reports history of elevated LFTs since undergoing cholecystectomy in 2011 with biliary complications requiring ERCP with stent placement and ultimately requiring biliary reconstruction, liver resection, and hepaticojejunostomy. Liver biopsy 08/2019 showed minimal portal inflammation and mild portal fibrosis. Labs 01/2022 showed T bili 2.1, alkaline phosphatase 572, AST 1411, .  Ultrasound in 05/2020 showed liver normal in size but minimally heterogeneous in echogenicity. Serologic work up has been unrevealing.  MRI/MRCP 02/2023 reviewed in multidisciplinary liver radiology conference and showed chronic dilation of biliary tree with possible stricture in right hepatic lobe.  Recommendations were for ERCP.  She had significant improvement in her LFTs without intervention, so ERCP was not pursued at that time.     She has had multiple ED visits and hospitalizations for what sounds like cholangitis treated with antibiotics. Discussed her case with advanced endoscopy who states that ERCP is not possible due to her surgical anatomy.    1. Elevated LFTs          Recommendations:  - Will refer to IR for consideration of PTC  - ER  precautions given for fever, abdominal pain, jaundice, or other symptoms concerning for cholangitis.    Return 3 months or sooner if needed.    Ata Childers MD  Staff Physician  Hepatology and Liver Transplant  [facility]  [facility]

## 2025-05-12 ENCOUNTER — PATIENT MESSAGE (OUTPATIENT)
Dept: HEPATOLOGY | Facility: CLINIC | Age: 50
End: 2025-05-12
Payer: MEDICAID

## 2025-05-13 ENCOUNTER — TELEPHONE (OUTPATIENT)
Dept: HEPATOLOGY | Facility: CLINIC | Age: 50
End: 2025-05-13
Payer: MEDICAID

## 2025-05-13 ENCOUNTER — PATIENT MESSAGE (OUTPATIENT)
Dept: INTERVENTIONAL RADIOLOGY/VASCULAR | Facility: CLINIC | Age: 50
End: 2025-05-13
Payer: MEDICAID

## 2025-05-13 DIAGNOSIS — R74.01 ELEVATED TRANSAMINASE LEVEL: ICD-10-CM

## 2025-05-13 DIAGNOSIS — R74.8 ELEVATED ALKALINE PHOSPHATASE LEVEL: ICD-10-CM

## 2025-05-13 DIAGNOSIS — R74.01 ELEVATED TRANSAMINASE LEVEL: Primary | ICD-10-CM

## 2025-05-13 DIAGNOSIS — R17 JAUNDICE: Primary | ICD-10-CM

## 2025-05-13 DIAGNOSIS — R17 JAUNDICE: ICD-10-CM

## 2025-05-13 NOTE — TELEPHONE ENCOUNTER
Patient was called and MRI/MRCP has been scheduled. Patient voiced agreement and understanding of appointment time, date, and location.     KARYN Ochoa    ----- Message from Ata Childers MD sent at 5/13/2025  8:21 AM CDT -----  Regarding: RE:  Thanks Magno!Gabriela, can you please let her know that I discussed her with interventional radiology, and we would like to repeat an MRI before placing a drain. Can you please also schedule her for MRI/MRCP in the next week or 2? Order is in. Thanks!Ata  ----- Message -----  From: Magno Tillman MD  Sent: 5/12/2025   4:11 PM CDT  To: Jamil Hernandez MD; Justin Guan MD; #  Subject: RE:                                              MRI w and w/o and MRCP. I'll start setting her up.Team,Procedure: PTCProcedure duration: 2 hoursLevel of sedation: generalCampus:  Performing physician (if any specific): AnyProcedure room: [facility] Clinic visit: NoPriority: Urgent - after MRI/MRCPSnapboard comments: PTC - Dr. Ata Childers - Sclerosing cholangitisOther comments: N/A  ----- Message -----  From: Ata Childers MD  Sent: 5/12/2025   3:26 PM CDT  To: Magno Tillman MD; Jamil Hernandez MD; Ri#  Subject: RE:                                              Sounds good. Do you want MRI only or MRI + MRCP?  ----- Message -----  From: Magno Tillman MD  Sent: 5/12/2025   2:53 PM CDT  To: Jamil Hernandez MD; Justin Guan MD; #    I think we will need to do PTC regardless but it would be good to have imaging, especially since we don't have anything since 2023 in our system. I'd prefer he gets a good MRI but if not I'll take a CT.Magno  ----- Message -----  From: Ata Childers MD  Sent: 5/12/2025   1:38 PM CDT  To: Magno Tillman MD; Jamil Hernandez MD; Ri#    Hey guys,This is sort of a complicated patient. 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 She had biliary complications requiring ERCP followed by PTC and ultimately hepaticojejunostomy at [facility] around 2017. We reviewed her MRI/MRCP in conference a couple years ago and recommended ERCP. GI said they wouldn't be able to do it due to her anatomy. Her LFTs subsequently normalized, so we held off on PTC.LFTs were fairly normal on outside labs 11/2024 but increased on labs here a week or so ago. She also reports intermittent severe abdominal pain, so I'm concerned she may have intermittent biliary obstruction.What are your thoughts on proceeding straight to PTC vs repeating imaging?Thanks,Ata

## 2025-05-20 ENCOUNTER — PATIENT MESSAGE (OUTPATIENT)
Dept: INTERVENTIONAL RADIOLOGY/VASCULAR | Facility: HOSPITAL | Age: 50
End: 2025-05-20
Payer: MEDICAID

## 2025-05-23 ENCOUNTER — HOSPITAL ENCOUNTER (OUTPATIENT)
Dept: RADIOLOGY | Facility: HOSPITAL | Age: 50
Discharge: HOME | End: 2025-05-23
Attending: STUDENT IN AN ORGANIZED HEALTH CARE EDUCATION/TRAINING PROGRAM
Payer: MEDICAID

## 2025-05-23 DIAGNOSIS — R17 JAUNDICE: ICD-10-CM

## 2025-05-23 DIAGNOSIS — R74.01 ELEVATED TRANSAMINASE LEVEL: ICD-10-CM

## 2025-05-23 DIAGNOSIS — R74.8 ELEVATED ALKALINE PHOSPHATASE LEVEL: ICD-10-CM

## 2025-05-23 PROCEDURE — 74183 MRI ABD W/O CNTR FLWD CNTR: CPT | Mod: TC

## 2025-05-26 ENCOUNTER — HOSPITAL ENCOUNTER (OUTPATIENT)
Dept: INTERVENTIONAL RADIOLOGY/VASCULAR | Facility: HOSPITAL | Age: 50
Discharge: HOME | End: 2025-05-26
Payer: MEDICAID

## 2025-05-26 ENCOUNTER — ANESTHESIA (OUTPATIENT)
Dept: INTERVENTIONAL RADIOLOGY/VASCULAR | Facility: HOSPITAL | Age: 50
End: 2025-05-26
Payer: MEDICAID

## 2025-05-26 VITALS
RESPIRATION RATE: 18 BRPM | HEIGHT: 57 IN | BODY MASS INDEX: 20.49 KG/M2 | DIASTOLIC BLOOD PRESSURE: 58 MMHG | OXYGEN SATURATION: 98 % | TEMPERATURE: 98 F | HEART RATE: 76 BPM | WEIGHT: 95 LBS | SYSTOLIC BLOOD PRESSURE: 106 MMHG

## 2025-05-26 DIAGNOSIS — K21.9 GASTROESOPHAGEAL REFLUX DISEASE, UNSPECIFIED WHETHER ESOPHAGITIS PRESENT: ICD-10-CM

## 2025-05-26 DIAGNOSIS — R74.01 ELEVATED TRANSAMINASE LEVEL: ICD-10-CM

## 2025-05-26 DIAGNOSIS — R74.8 ELEVATED ALKALINE PHOSPHATASE LEVEL: ICD-10-CM

## 2025-05-26 DIAGNOSIS — R17 JAUNDICE: ICD-10-CM

## 2025-05-26 DIAGNOSIS — J44.9 CHRONIC OBSTRUCTIVE PULMONARY DISEASE, UNSPECIFIED COPD TYPE: Primary | ICD-10-CM

## 2025-05-26 PROCEDURE — 37000008 HC ANESTHESIA 1ST 15 MINUTES

## 2025-05-26 PROCEDURE — C1894 INTRO/SHEATH, NON-LASER: HCPCS

## 2025-05-26 PROCEDURE — 25000003 PHARM REV CODE 250: Performed by: NURSE ANESTHETIST, CERTIFIED REGISTERED

## 2025-05-26 PROCEDURE — 47534 PLMT BILIARY DRAINAGE CATH: CPT | Mod: LT | Performed by: RADIOLOGY

## 2025-05-26 PROCEDURE — 63600175 PHARM REV CODE 636 W HCPCS: Performed by: ANESTHESIOLOGY

## 2025-05-26 PROCEDURE — C1769 GUIDE WIRE: HCPCS

## 2025-05-26 PROCEDURE — 25500020 PHARM REV CODE 255: Performed by: ANESTHESIOLOGY

## 2025-05-26 PROCEDURE — 63600175 PHARM REV CODE 636 W HCPCS: Performed by: NURSE ANESTHETIST, CERTIFIED REGISTERED

## 2025-05-26 PROCEDURE — 25000003 PHARM REV CODE 250: Performed by: RADIOLOGY

## 2025-05-26 PROCEDURE — C1729 CATH, DRAINAGE: HCPCS

## 2025-05-26 PROCEDURE — 27201423 OPTIME MED/SURG SUP & DEVICES STERILE SUPPLY

## 2025-05-26 PROCEDURE — 37000009 HC ANESTHESIA EA ADD 15 MINS

## 2025-05-26 RX ORDER — PROPOFOL 10 MG/ML
VIAL (ML) INTRAVENOUS
Status: DISCONTINUED | OUTPATIENT
Start: 2025-05-26 | End: 2025-05-26

## 2025-05-26 RX ORDER — SODIUM CHLORIDE 9 MG/ML
INJECTION, SOLUTION INTRAVENOUS CONTINUOUS
Status: DISCONTINUED | OUTPATIENT
Start: 2025-05-26 | End: 2025-05-27 | Stop reason: HOSPADM

## 2025-05-26 RX ORDER — OXYCODONE HYDROCHLORIDE 5 MG/1
5 TABLET ORAL EVERY 4 HOURS PRN
Refills: 0 | Status: DISCONTINUED | OUTPATIENT
Start: 2025-05-26 | End: 2025-05-27 | Stop reason: HOSPADM

## 2025-05-26 RX ORDER — LIDOCAINE HYDROCHLORIDE 10 MG/ML
1 INJECTION, SOLUTION EPIDURAL; INFILTRATION; INTRACAUDAL; PERINEURAL ONCE
Status: DISCONTINUED | OUTPATIENT
Start: 2025-05-26 | End: 2025-05-27 | Stop reason: HOSPADM

## 2025-05-26 RX ORDER — LIDOCAINE HYDROCHLORIDE 20 MG/ML
INJECTION INTRAVENOUS
Status: DISCONTINUED | OUTPATIENT
Start: 2025-05-26 | End: 2025-05-26

## 2025-05-26 RX ORDER — FENTANYL CITRATE 50 UG/ML
INJECTION, SOLUTION INTRAMUSCULAR; INTRAVENOUS
Status: DISCONTINUED | OUTPATIENT
Start: 2025-05-26 | End: 2025-05-26

## 2025-05-26 RX ORDER — HYDROMORPHONE HYDROCHLORIDE 1 MG/ML
0.2 INJECTION, SOLUTION INTRAMUSCULAR; INTRAVENOUS; SUBCUTANEOUS EVERY 5 MIN PRN
Status: COMPLETED | OUTPATIENT
Start: 2025-05-26 | End: 2025-05-26

## 2025-05-26 RX ORDER — OXYCODONE AND ACETAMINOPHEN 5; 325 MG/1; MG/1
1 TABLET ORAL EVERY 4 HOURS PRN
Qty: 10 TABLET | Refills: 0 | Status: SHIPPED | OUTPATIENT
Start: 2025-05-26

## 2025-05-26 RX ORDER — ONDANSETRON HYDROCHLORIDE 2 MG/ML
4 INJECTION, SOLUTION INTRAVENOUS ONCE AS NEEDED
Status: COMPLETED | OUTPATIENT
Start: 2025-05-26 | End: 2025-05-26

## 2025-05-26 RX ORDER — DEXAMETHASONE SODIUM PHOSPHATE 4 MG/ML
INJECTION, SOLUTION INTRA-ARTICULAR; INTRALESIONAL; INTRAMUSCULAR; INTRAVENOUS; SOFT TISSUE
Status: DISCONTINUED | OUTPATIENT
Start: 2025-05-26 | End: 2025-05-26

## 2025-05-26 RX ORDER — PHENYLEPHRINE HYDROCHLORIDE 10 MG/ML
INJECTION INTRAVENOUS
Status: DISCONTINUED | OUTPATIENT
Start: 2025-05-26 | End: 2025-05-26

## 2025-05-26 RX ORDER — ROCURONIUM BROMIDE 10 MG/ML
INJECTION, SOLUTION INTRAVENOUS
Status: DISCONTINUED | OUTPATIENT
Start: 2025-05-26 | End: 2025-05-26

## 2025-05-26 RX ORDER — ONDANSETRON HYDROCHLORIDE 2 MG/ML
INJECTION, SOLUTION INTRAVENOUS
Status: DISCONTINUED | OUTPATIENT
Start: 2025-05-26 | End: 2025-05-26

## 2025-05-26 RX ORDER — ONDANSETRON HYDROCHLORIDE 2 MG/ML
4 INJECTION, SOLUTION INTRAVENOUS EVERY 6 HOURS PRN
Status: DISCONTINUED | OUTPATIENT
Start: 2025-05-26 | End: 2025-05-27 | Stop reason: HOSPADM

## 2025-05-26 RX ORDER — MIDAZOLAM HYDROCHLORIDE 1 MG/ML
INJECTION INTRAMUSCULAR; INTRAVENOUS
Status: DISCONTINUED | OUTPATIENT
Start: 2025-05-26 | End: 2025-05-26

## 2025-05-26 RX ORDER — HYDROMORPHONE HYDROCHLORIDE 1 MG/ML
0.2 INJECTION, SOLUTION INTRAMUSCULAR; INTRAVENOUS; SUBCUTANEOUS EVERY 5 MIN PRN
Status: DISCONTINUED | OUTPATIENT
Start: 2025-05-26 | End: 2025-05-27 | Stop reason: HOSPADM

## 2025-05-26 RX ORDER — ONDANSETRON HYDROCHLORIDE 2 MG/ML
4 INJECTION, SOLUTION INTRAVENOUS DAILY PRN
Status: DISCONTINUED | OUTPATIENT
Start: 2025-05-26 | End: 2025-05-27 | Stop reason: HOSPADM

## 2025-05-26 RX ADMIN — SUGAMMADEX 200 MG: 100 INJECTION, SOLUTION INTRAVENOUS at 12:05

## 2025-05-26 RX ADMIN — HYDROMORPHONE HYDROCHLORIDE 0.2 MG: 1 INJECTION, SOLUTION INTRAMUSCULAR; INTRAVENOUS; SUBCUTANEOUS at 12:05

## 2025-05-26 RX ADMIN — ROCURONIUM BROMIDE 50 MG: 10 INJECTION INTRAVENOUS at 11:05

## 2025-05-26 RX ADMIN — HYDROMORPHONE HYDROCHLORIDE 0.2 MG: 1 INJECTION, SOLUTION INTRAMUSCULAR; INTRAVENOUS; SUBCUTANEOUS at 01:05

## 2025-05-26 RX ADMIN — DEXAMETHASONE SODIUM PHOSPHATE 4 MG: 4 INJECTION, SOLUTION INTRAMUSCULAR; INTRAVENOUS at 11:05

## 2025-05-26 RX ADMIN — ONDANSETRON 4 MG: 2 INJECTION INTRAMUSCULAR; INTRAVENOUS at 11:05

## 2025-05-26 RX ADMIN — HYDROMORPHONE HYDROCHLORIDE 0.2 MG: 1 INJECTION, SOLUTION INTRAMUSCULAR; INTRAVENOUS; SUBCUTANEOUS at 02:05

## 2025-05-26 RX ADMIN — PHENYLEPHRINE HYDROCHLORIDE 100 MCG: 10 INJECTION INTRAVENOUS at 11:05

## 2025-05-26 RX ADMIN — LIDOCAINE HYDROCHLORIDE 100 MG: 20 INJECTION INTRAVENOUS at 11:05

## 2025-05-26 RX ADMIN — IOHEXOL 20 ML: 300 INJECTION, SOLUTION INTRAVENOUS at 12:05

## 2025-05-26 RX ADMIN — MIDAZOLAM HYDROCHLORIDE 2 MG: 2 INJECTION, SOLUTION INTRAMUSCULAR; INTRAVENOUS at 11:05

## 2025-05-26 RX ADMIN — PROPOFOL 120 MG: 10 INJECTION, EMULSION INTRAVENOUS at 11:05

## 2025-05-26 RX ADMIN — SODIUM CHLORIDE: 0.9 INJECTION, SOLUTION INTRAVENOUS at 11:05

## 2025-05-26 RX ADMIN — OXYCODONE 5 MG: 5 TABLET ORAL at 12:05

## 2025-05-26 RX ADMIN — ONDANSETRON 4 MG: 2 INJECTION INTRAMUSCULAR; INTRAVENOUS at 12:05

## 2025-05-26 RX ADMIN — FENTANYL CITRATE 100 MCG: 50 INJECTION, SOLUTION INTRAMUSCULAR; INTRAVENOUS at 11:05

## 2025-05-26 RX ADMIN — CEFTRIAXONE 1 G: 1 INJECTION, POWDER, FOR SOLUTION INTRAMUSCULAR; INTRAVENOUS at 11:05

## 2025-05-26 RX ADMIN — MIDAZOLAM HYDROCHLORIDE 2 MG: 2 INJECTION, SOLUTION INTRAMUSCULAR; INTRAVENOUS at 10:05

## 2025-05-26 NOTE — PLAN OF CARE
Procedure completed. Patient tolerated well; VSS. Site CDI without hematoma. Patient to be transported to PACU  10 accompanied by CRNA and RN; report to be given at the bedside.

## 2025-05-26 NOTE — ANESTHESIA PROCEDURE NOTES
Intubation    Date/Time: 5/26/2025 11:19 AM    Performed by: Tiffanie Man CRNA  Authorized by: Michael Bunch MD    Intubation:     Induction:  Intravenous    Intubated:  Postinduction    Mask Ventilation:  Easy mask    Attempts:  1    Attempted By:  CRNA    Method of Intubation:  Video laryngoscopy    Blade:  Porter 3    Laryngeal View Grade: Grade I - full view of cords      Difficult Airway Encountered?: No      Complications:  None    Airway Device:  Oral endotracheal tube    Airway Device Size:  7.0    Style/Cuff Inflation:  Cuffed (inflated to minimal occlusive pressure)    Tube secured:  20    Secured at:  The lips    Placement Verified By:  Capnometry    Complicating Factors:  None    Findings Post-Intubation:  BS equal bilateral and atraumatic/condition of teeth unchanged

## 2025-05-26 NOTE — PLAN OF CARE
Pt arrived to [facility] for ptc. Pt oriented to unit and staff, Pt safely transferred from stretcher to procedural table. Fall risk reviewed and comfort measures utilized with interventions. Safety strap applied, position pillows to minimize pressure points. Blankets applied. Pt prepped and draped utilizing standard sterile technique. Patient placed on continuous monitoring, as required by sedation policy. Timeouts implemented utilizing standard universal time-out per department and facility policy. CRNA to remain at bedside with continuous monitoring. Pt resting comfortably. Denies pain/discomfort. Will continue to monitor. See flow sheets for monitoring, medication administration, and updates. patient verbalizes understanding.

## 2025-05-26 NOTE — NURSING TRANSFER
Nursing Transfer Note      5/26/2025   12:30 PM    Nurse giving handoff:Melvin BUSTILLOS Rn  Nurse receiving handoff:[facility] Rn    Reason patient is being transferred: postop/home    Transfer To: [facility] #[id number]    Transfer via stretcher    Transfer with n/a    Transported by Transport pacu    Transfer Vital Signs:  Blood Pressure:see lfowsheet  Heart Rate:  O2:  Temperature:  Respirations:    Telemetry: n/a  Order for Tele Monitor? No    Additional Lines: Billiary drain    Medicines sent: n/a    Any special needs or follow-up needed:     Patient belongings transferred with patient: No    Chart send with patient: Yes    Notified: spouse    Patient reassessed at: 5/26/25  1  Upon arrival to floor: bed in lowest position

## 2025-05-26 NOTE — H&P
Vascular and Interventional Radiology   History & Physical    Date:  5/26/2025      Chief Complaint:   Biliary obstruction    History of Present Illness:  Tiffanie Gao is a 49 y.o. female with a history of COPD, biliary dilatation who presents for PTC and possible biliary tube placement.    Past Medical History:  Past Medical History:   Diagnosis Date    Asthma     COPD (chronic obstructive pulmonary disease)     Liver disease     Mental disorder        Past Surgical History:  Past Surgical History:   Procedure Laterality Date    Bile duct reconstruction      CHOLECYSTECTOMY      HYSTERECTOMY      partial liver resection          Sedation History:    Denies any adverse reactions.  Denies problems laying flat.    Social History:  Social History[1]     Home Medications:   Prior to Admission medications    Medication Sig Start Date End Date Taking? Authorizing Provider   albuterol (PROVENTIL/VENTOLIN HFA) 90 mcg/actuation inhaler Inhale into the lungs. 5/17/22  Yes Provider, Historical   ALPRAZolam (XANAX) 0.5 MG tablet Take 0.5 mg by mouth daily as needed. 4/12/23  Yes Provider, Historical   famotidine (PEPCID) 40 MG tablet Take 40 mg by mouth. 4/11/23  Yes Provider, Historical   megestroL (MEGACE) 400 mg/10 mL (40 mg/mL) Susp Take 200 mg by mouth once daily.   Yes Provider, Historical   ondansetron (ZOFRAN-ODT) 4 MG TbDL Take 4 mg by mouth every 6 (six) hours as needed. 8/12/22  Yes Provider, Historical   pantoprazole (PROTONIX) 40 MG tablet Take 40 mg by mouth every evening.   Yes Provider, Historical   SYMBICORT 160-4.5 mcg/actuation HFAA Inhale 2 puffs into the lungs every 12 (twelve) hours. 5/17/22  Yes Provider, Historical   traZODone (DESYREL) 100 MG tablet Take 100 mg by mouth every evening.   Yes Provider, Historical   albuterol-ipratropium (DUO-NEB) 2.5 mg-0.5 mg/3 mL nebulizer solution Inhale 3 mLs into the lungs every 6 (six) hours as needed.    Provider, Historical   alendronate (FOSAMAX) 70 MG  tablet Take 70 mg by mouth every 7 days.    Provider, Historical   baclofen (LIORESAL) 10 MG tablet Take 10 mg by mouth.  Patient not taking: Reported on 11/11/2024 4/12/23   Provider, Historical   CAPLYTA 42 mg Cap Take 1 capsule by mouth Daily. 5/1/25   Provider, Historical   hydrOXYzine (ATARAX) 50 MG tablet Take 50 mg by mouth daily as needed.    Provider, Historical   lansoprazole (PREVACID) 30 MG capsule Take 30 mg by mouth.  Patient not taking: Reported on 11/11/2024 4/7/23   Provider, Historical   LATUDA 40 mg Tab tablet Take 40 mg by mouth every evening.  Patient not taking: Reported on 11/11/2024 1/10/23   Provider, Historical   QUEtiapine (SEROQUEL) 50 MG tablet Take 50 mg by mouth.  Patient not taking: Reported on 11/11/2024 2/1/24   Provider, Historical   rOPINIRole (REQUIP) 2 MG tablet Take 2 mg by mouth 2 (two) times daily. 5/8/25   Provider, Historical   ursodioL (ACTIGALL) 300 mg capsule Take 1 capsule (300 mg total) by mouth 2 (two) times daily. 11/11/24 11/11/25  Ata Childers MD   VRAYLAR 3 mg Cap Take 3 mg by mouth.  Patient not taking: Reported on 11/11/2024 1/19/24   Provider, Historical       Inpatient Medications:  Current Medications[2]     Anticoagulants/Antiplatelets:   no anticoagulation    Allergies:   Review of patient's allergies indicates:   Allergen Reactions    Sumatriptan Nausea And Vomiting     Other reaction(s): vomiting       Review of Systems:   As documented in primary provider H&P.    Vital Signs (Most Recent):  Temp: 98.4 °F (36.9 °C) (05/26/25 0732)  Pulse: 84 (05/26/25 0732)  Resp: 16 (05/26/25 0732)  BP: 114/64 (05/26/25 0733)  SpO2: 100 % (05/26/25 0732)    Physical Exam:  No acute distress, laying comfortably in bed, pleasant and cooperative  Regular rate and rhythm  Breathing unlabored  Abdomen benign  Extremities warm and well perfused    Sedation Exam:  ASA: II - Patient appears to have mild systemic disease, adequately controlled   Mallampati: II (hard and soft  palate, upper portion of tonsils anduvula visible)     Laboratory:  Lab Results   Component Value Date    INR 0.9 (L) 05/02/2025       Lab Results   Component Value Date    WBC 5.91 05/02/2025    HGB 13.4 05/02/2025    HCT 41.1 05/02/2025    MCV 88.4 05/02/2025     05/02/2025      Lab Results   Component Value Date    GLU 50 (L) 05/02/2025     05/02/2025    K 4.0 05/02/2025     05/02/2025    CO2 28 05/02/2025    BUN 15.6 05/02/2025    CREATININE 0.80 05/02/2025    CALCIUM 9.7 05/02/2025     (H) 05/02/2025     (H) 05/02/2025    ALBUMIN 3.6 05/02/2025    BILITOT 0.7 05/02/2025    BILIDIR 0.90 (H) 12/08/2019       Imaging:  Reviewed.      ASSESSMENT/PLAN:                         Sedation Plan: general per anesthesia  Patient will undergo: PTC with possible drain placement    Rob Baldwin MD  PGY-3  Dept. of Radiology         [1]   Social History  Tobacco Use    Smoking status: Never    Smokeless tobacco: Never   Substance Use Topics    Alcohol use: Not Currently    Drug use: Never   [2]   Current Outpatient Medications:     albuterol (PROVENTIL/VENTOLIN HFA) 90 mcg/actuation inhaler, Inhale into the lungs., Disp: , Rfl:     ALPRAZolam (XANAX) 0.5 MG tablet, Take 0.5 mg by mouth daily as needed., Disp: , Rfl:     famotidine (PEPCID) 40 MG tablet, Take 40 mg by mouth., Disp: , Rfl:     megestroL (MEGACE) 400 mg/10 mL (40 mg/mL) Susp, Take 200 mg by mouth once daily., Disp: , Rfl:     ondansetron (ZOFRAN-ODT) 4 MG TbDL, Take 4 mg by mouth every 6 (six) hours as needed., Disp: , Rfl:     pantoprazole (PROTONIX) 40 MG tablet, Take 40 mg by mouth every evening., Disp: , Rfl:     SYMBICORT 160-4.5 mcg/actuation HFAA, Inhale 2 puffs into the lungs every 12 (twelve) hours., Disp: , Rfl:     traZODone (DESYREL) 100 MG tablet, Take 100 mg by mouth every evening., Disp: , Rfl:     albuterol-ipratropium (DUO-NEB) 2.5 mg-0.5 mg/3 mL nebulizer solution, Inhale 3 mLs into the lungs every 6 (six)  hours as needed., Disp: , Rfl:     alendronate (FOSAMAX) 70 MG tablet, Take 70 mg by mouth every 7 days., Disp: , Rfl:     baclofen (LIORESAL) 10 MG tablet, Take 10 mg by mouth. (Patient not taking: Reported on 11/11/2024), Disp: , Rfl:     CAPLYTA 42 mg Cap, Take 1 capsule by mouth Daily., Disp: , Rfl:     hydrOXYzine (ATARAX) 50 MG tablet, Take 50 mg by mouth daily as needed., Disp: , Rfl:     lansoprazole (PREVACID) 30 MG capsule, Take 30 mg by mouth. (Patient not taking: Reported on 11/11/2024), Disp: , Rfl:     LATUDA 40 mg Tab tablet, Take 40 mg by mouth every evening. (Patient not taking: Reported on 11/11/2024), Disp: , Rfl:     QUEtiapine (SEROQUEL) 50 MG tablet, Take 50 mg by mouth. (Patient not taking: Reported on 11/11/2024), Disp: , Rfl:     rOPINIRole (REQUIP) 2 MG tablet, Take 2 mg by mouth 2 (two) times daily., Disp: , Rfl:     ursodioL (ACTIGALL) 300 mg capsule, Take 1 capsule (300 mg total) by mouth 2 (two) times daily., Disp: 60 capsule, Rfl: 11    VRAYLAR 3 mg Cap, Take 3 mg by mouth. (Patient not taking: Reported on 11/11/2024), Disp: , Rfl:     Current Facility-Administered Medications:     0.9% NaCl infusion, , Intravenous, Continuous, Suellen Owen NP    LIDOcaine (PF) 10 mg/ml (1%) injection 10 mg, 1 mL, Other, Once, Suellen Owen, JULIO

## 2025-05-26 NOTE — PROCEDURES
Radiology Post-Procedure Note    Pre Op Diagnosis: Elevated LFTs    Post Op Diagnosis: Same    Procedure: Percutaneous transhepatic cholangiography    Procedure performed by: Magno Tillman MD    Written Informed Consent Obtained: Yes    Specimen Removed: NO    Estimated Blood Loss: Minimal    Findings:    Local anesthesia and moderate sedation were used.    Under US and fluoro guidance, a peripheral duct in the left hepatic lobe was accessed using a 22 gauge Chiba needle and contrast was injected confirming position in the biliary tree. A microwire was advanced into the common bile duct and the system was upsized using an Accustick introducer set. A J-wire was then advanced into the small bowel and the tract was serially dilated. An 10.0 internal/external biliary drainage catheter was advanced over the wire and the pigtail was formed in the small bowel. Cholangiogram was performed demonstrating appropriate position of the pigtail and the catheter side holes. The catheter was secured to the skin using 2-0 silk sutures. The catheter was left to gravity drainage and dressed with a sterile dressing.    There were no complications.  Please see Imaging report for further details.      Magno Tillman M.D.  Interventional Radiology  Department of Radiology

## 2025-05-26 NOTE — DISCHARGE SUMMARY
Radiology Discharge Summary      Hospital Course: No complications    Admit Date: 5/26/2025  Discharge Date: 05/26/2025     Instructions Given to Patient: Yes  Diet: Resume prior diet  Activity: activity as tolerated and no driving for today    Description of Condition on Discharge: Stable  Vital Signs (Most Recent): Temp: 98.4 °F (36.9 °C) (05/26/25 0732)  Pulse: 84 (05/26/25 0732)  Resp: 16 (05/26/25 0732)  BP: 114/64 (05/26/25 0733)  SpO2: 100 % (05/26/25 0732)    Discharge Disposition: Home    Discharge Diagnosis: Elevated LFTs s/p biliary drain placement    Follow up: As scheduled    Magno Tillman M.D.  Interventional Radiology  Department of Radiology

## 2025-05-26 NOTE — TRANSFER OF CARE
"Anesthesia Transfer of Care Note    Patient: Tiffanie Gao    Procedure(s) Performed: * No procedures listed *    Patient location: PACU    Anesthesia Type: general    Transport from OR: Transported from OR on room air with adequate spontaneous ventilation    Post pain: adequate analgesia    Post assessment: no apparent anesthetic complications and tolerated procedure well    Post vital signs: stable    Level of consciousness: awake, alert and oriented    Nausea/Vomiting: no nausea/vomiting    Complications: none    Transfer of care protocol was followed      Last vitals: Visit Vitals  /64 (BP Location: Right arm, Patient Position: Lying)   Pulse 108   Temp 36.5 °C (97.7 °F) (Temporal)   Resp 18   Ht 4' 9" (1.448 m)   Wt 43.1 kg (95 lb)   SpO2 100%   Breastfeeding No   BMI 20.56 kg/m²     "

## 2025-05-28 NOTE — ANESTHESIA POSTPROCEDURE EVALUATION
Anesthesia Post Evaluation    Patient: Tiffanie Gao    Procedure(s) Performed: * No procedures listed *    Final Anesthesia Type: general      Patient location during evaluation: PACU  Patient participation: Yes- Able to Participate  Level of consciousness: awake  Post-procedure vital signs: reviewed and stable  Pain management: adequate  Airway patency: patent    PONV status at discharge: No PONV  Anesthetic complications: no      Cardiovascular status: blood pressure returned to baseline  Respiratory status: unassisted  Hydration status: euvolemic  Follow-up not needed.              Vitals Value Taken Time   /58 05/26/25 15:46   Temp 36.6 °C (97.8 °F) 05/26/25 16:00   Pulse 76 05/26/25 16:00   Resp 18 05/26/25 16:00   SpO2 98 % 05/26/25 16:00         Event Time   Out of Recovery 13:52:00         Pain/Sam Score: No data recorded

## 2025-05-30 ENCOUNTER — HOSPITAL ENCOUNTER (EMERGENCY)
Facility: HOSPITAL | Age: 50
Discharge: HOME | End: 2025-05-30
Attending: EMERGENCY MEDICINE
Payer: MEDICAID

## 2025-05-30 ENCOUNTER — TELEPHONE (OUTPATIENT)
Dept: HEPATOLOGY | Facility: CLINIC | Age: 50
End: 2025-05-30
Payer: MEDICAID

## 2025-05-30 VITALS
WEIGHT: 95 LBS | HEART RATE: 84 BPM | HEIGHT: 57 IN | BODY MASS INDEX: 20.49 KG/M2 | OXYGEN SATURATION: 95 % | RESPIRATION RATE: 16 BRPM | TEMPERATURE: 98 F | SYSTOLIC BLOOD PRESSURE: 112 MMHG | DIASTOLIC BLOOD PRESSURE: 76 MMHG

## 2025-05-30 DIAGNOSIS — R10.13 EPIGASTRIC PAIN: Primary | ICD-10-CM

## 2025-05-30 LAB
ABSOLUTE EOSINOPHIL (OHS): 0.13 K/UL
ABSOLUTE MONOCYTE (OHS): 0.44 K/UL (ref 0.3–1)
ABSOLUTE NEUTROPHIL COUNT (OHS): 4.4 K/UL (ref 1.8–7.7)
ALBUMIN SERPL BCP-MCNC: 4.2 G/DL (ref 3.5–5.2)
ALP SERPL-CCNC: 599 UNIT/L (ref 40–150)
ALT SERPL W/O P-5'-P-CCNC: 50 UNIT/L (ref 10–44)
ANION GAP (OHS): 12 MMOL/L (ref 8–16)
AST SERPL-CCNC: 38 UNIT/L (ref 11–45)
BASOPHILS # BLD AUTO: 0.05 K/UL
BASOPHILS NFR BLD AUTO: 0.8 %
BILIRUB SERPL-MCNC: 1 MG/DL (ref 0.1–1)
BUN SERPL-MCNC: 14 MG/DL (ref 6–20)
CALCIUM SERPL-MCNC: 10.2 MG/DL (ref 8.7–10.5)
CHLORIDE SERPL-SCNC: 99 MMOL/L (ref 95–110)
CO2 SERPL-SCNC: 27 MMOL/L (ref 23–29)
CREAT SERPL-MCNC: 0.9 MG/DL (ref 0.5–1.4)
ERYTHROCYTE [DISTWIDTH] IN BLOOD BY AUTOMATED COUNT: 13 % (ref 11.5–14.5)
GFR SERPLBLD CREATININE-BSD FMLA CKD-EPI: >60 ML/MIN/1.73/M2
GLUCOSE SERPL-MCNC: 76 MG/DL (ref 70–110)
HCT VFR BLD AUTO: 46.1 % (ref 37–48.5)
HGB BLD-MCNC: 15.3 GM/DL (ref 12–16)
IMM GRANULOCYTES # BLD AUTO: 0.03 K/UL (ref 0–0.04)
IMM GRANULOCYTES NFR BLD AUTO: 0.5 % (ref 0–0.5)
LIPASE SERPL-CCNC: 20 U/L (ref 4–60)
LYMPHOCYTES # BLD AUTO: 1.29 K/UL (ref 1–4.8)
MCH RBC QN AUTO: 28.8 PG (ref 27–31)
MCHC RBC AUTO-ENTMCNC: 33.2 G/DL (ref 32–36)
MCV RBC AUTO: 87 FL (ref 82–98)
NUCLEATED RBC (/100WBC) (OHS): 0 /100 WBC
PLATELET # BLD AUTO: 232 K/UL (ref 150–450)
PMV BLD AUTO: 11.2 FL (ref 9.2–12.9)
POTASSIUM SERPL-SCNC: 4.5 MMOL/L (ref 3.5–5.1)
PROT SERPL-MCNC: 8.5 GM/DL (ref 6–8.4)
RBC # BLD AUTO: 5.31 M/UL (ref 4–5.4)
RELATIVE EOSINOPHIL (OHS): 2.1 %
RELATIVE LYMPHOCYTE (OHS): 20.3 % (ref 18–48)
RELATIVE MONOCYTE (OHS): 6.9 % (ref 4–15)
RELATIVE NEUTROPHIL (OHS): 69.4 % (ref 38–73)
SODIUM SERPL-SCNC: 138 MMOL/L (ref 136–145)
WBC # BLD AUTO: 6.34 K/UL (ref 3.9–12.7)

## 2025-05-30 PROCEDURE — 83690 ASSAY OF LIPASE: CPT | Performed by: EMERGENCY MEDICINE

## 2025-05-30 PROCEDURE — 99285 EMERGENCY DEPT VISIT HI MDM: CPT | Mod: 25

## 2025-05-30 PROCEDURE — 96375 TX/PRO/DX INJ NEW DRUG ADDON: CPT

## 2025-05-30 PROCEDURE — 85025 COMPLETE CBC W/AUTO DIFF WBC: CPT | Performed by: EMERGENCY MEDICINE

## 2025-05-30 PROCEDURE — 96374 THER/PROPH/DIAG INJ IV PUSH: CPT

## 2025-05-30 PROCEDURE — 25500020 PHARM REV CODE 255: Performed by: EMERGENCY MEDICINE

## 2025-05-30 PROCEDURE — 63600175 PHARM REV CODE 636 W HCPCS: Performed by: EMERGENCY MEDICINE

## 2025-05-30 PROCEDURE — 87040 BLOOD CULTURE FOR BACTERIA: CPT | Performed by: EMERGENCY MEDICINE

## 2025-05-30 PROCEDURE — 84132 ASSAY OF SERUM POTASSIUM: CPT | Performed by: EMERGENCY MEDICINE

## 2025-05-30 RX ORDER — OXYCODONE HYDROCHLORIDE 5 MG/1
5 TABLET ORAL EVERY 4 HOURS PRN
Qty: 15 TABLET | Refills: 0 | Status: SHIPPED | OUTPATIENT
Start: 2025-05-30 | End: 2025-06-02

## 2025-05-30 RX ORDER — ONDANSETRON HYDROCHLORIDE 2 MG/ML
4 INJECTION, SOLUTION INTRAVENOUS
Status: COMPLETED | OUTPATIENT
Start: 2025-05-30 | End: 2025-05-30

## 2025-05-30 RX ORDER — MORPHINE SULFATE 4 MG/ML
4 INJECTION, SOLUTION INTRAMUSCULAR; INTRAVENOUS
Refills: 0 | Status: COMPLETED | OUTPATIENT
Start: 2025-05-30 | End: 2025-05-30

## 2025-05-30 RX ORDER — HYDROMORPHONE HYDROCHLORIDE 1 MG/ML
1 INJECTION, SOLUTION INTRAMUSCULAR; INTRAVENOUS; SUBCUTANEOUS
Refills: 0 | Status: COMPLETED | OUTPATIENT
Start: 2025-05-30 | End: 2025-05-30

## 2025-05-30 RX ADMIN — HYDROMORPHONE HYDROCHLORIDE 1 MG: 1 INJECTION, SOLUTION INTRAMUSCULAR; INTRAVENOUS; SUBCUTANEOUS at 05:05

## 2025-05-30 RX ADMIN — MORPHINE SULFATE 4 MG: 4 INJECTION INTRAVENOUS at 02:05

## 2025-05-30 RX ADMIN — IOHEXOL 75 ML: 350 INJECTION, SOLUTION INTRAVENOUS at 03:05

## 2025-05-30 RX ADMIN — ONDANSETRON 4 MG: 2 INJECTION INTRAMUSCULAR; INTRAVENOUS at 02:05

## 2025-05-30 NOTE — TELEPHONE ENCOUNTER
I returned phone call to pt.  She stated she started to have chills and general discomfort 2 days after her biliary drainage catheter was placed. Abdominal pain started yesterday. Today, she woke up with severe lower back pain.    Patient instructed to come to the ER.  She agreed, she will come to [facility] ER.

## 2025-05-30 NOTE — TELEPHONE ENCOUNTER
----- Message from Med Assistant Ochoa sent at 5/30/2025  9:06 AM CDT -----  Regarding: FW: Patient advice  Contact: Pt [phone]  Coretta, can you please further triage the pt's symptoms? Thank youu!  ----- Message -----  From: Elissa Webber  Sent: 5/30/2025   8:51 AM CDT  To: [facility]  Subject: Patient advice                                   Name of Caller:  Tiffanie  Contact Preference:   [phone] of Call: Requesting a call back have concerns of nausea and lower back pain where the drainage was placed

## 2025-06-04 ENCOUNTER — TELEPHONE (OUTPATIENT)
Dept: HEPATOLOGY | Facility: CLINIC | Age: 50
End: 2025-06-04
Payer: MEDICAID

## 2025-06-04 LAB
BACTERIA BLD CULT: NORMAL
BACTERIA BLD CULT: NORMAL

## 2025-06-05 ENCOUNTER — TELEPHONE (OUTPATIENT)
Dept: HEPATOLOGY | Facility: CLINIC | Age: 50
End: 2025-06-05
Payer: MEDICAID

## 2025-06-05 ENCOUNTER — TELEPHONE (OUTPATIENT)
Dept: INTERVENTIONAL RADIOLOGY/VASCULAR | Facility: CLINIC | Age: 50
End: 2025-06-05
Payer: MEDICAID

## 2025-06-05 DIAGNOSIS — R74.8 ELEVATED ALKALINE PHOSPHATASE LEVEL: ICD-10-CM

## 2025-06-05 DIAGNOSIS — R17 JAUNDICE: ICD-10-CM

## 2025-06-05 DIAGNOSIS — R74.01 ELEVATED TRANSAMINASE LEVEL: Primary | ICD-10-CM

## 2025-06-09 ENCOUNTER — HOSPITAL ENCOUNTER (OUTPATIENT)
Dept: PREADMISSION TESTING | Facility: HOSPITAL | Age: 50
Discharge: HOME | End: 2025-06-09
Attending: FAMILY MEDICINE
Payer: MEDICAID

## 2025-06-09 ENCOUNTER — TELEPHONE (OUTPATIENT)
Dept: HEPATOLOGY | Facility: CLINIC | Age: 50
End: 2025-06-09
Payer: MEDICAID

## 2025-06-09 VITALS — HEIGHT: 57 IN | WEIGHT: 95 LBS | BODY MASS INDEX: 20.49 KG/M2

## 2025-06-09 NOTE — TELEPHONE ENCOUNTER
PT called to notify that she has still had no drainage come from the biliary tube. She states where the tube goes in is crusty and red and having tenderness, Please call to discuss with PT possible sched for removal.

## 2025-06-09 NOTE — DISCHARGE INSTRUCTIONS
YOUR PROCEDURE WILL BE AT [facility] at [address], [place]. [address]           Enter through the Main Entrance facing [address].      Your procedure  is scheduled for __________.   Arrive in Same Day Surgery at ____________    You may have up to three visitors.  No children under 18 years old.     You will be going to the Same Day Surgery Unit on the 2nd floor of the hospital.    Report to the Same Day Surgery Registration Desk in the hallway.(Just beside the Same Day Surgery Unit)                    DO NOT PARK IN THE GARAGE.  THERE IS NO OPEN ENTRANCE TO THE HOSPITAL BUILDING AND NO ELEVATOR.      Important instructions:  Do not eat or drink anything after midnight.         SEE MEDICATION SHEET.   TAKE MEDICATIONS AS DIRECTED WITH SIPS OF WATER.    Do not take any diabetic medication on the morning of surgery unless instructed to do so by your doctor or pre op nurse.  Be sure to take all blood pressure medications.          You may take Tylenol if needed which is not a blood thinner.    Please shower the night before and the morning of your surgery.      No shaving of procedural area at least 4-5 days before surgery due to increased risk of skin irritation and/or possible infection.    Female patients may be asked for a urine specimen on the morning of the surgery.  Please check with your nurse before using the restroom.  Contact lenses and removable denture work may not be worn during your procedure.    You may wear deodorant only. If you are having breast surgery, do not wear deodorant on the operative side.    Do not wear powder, body lotion, perfume/cologne or make-up.    Do not wear any jewelry or have any metal on your body.    You will be asked to remove any dentures or partials for the procedure.    If you are going home on the same day of surgery, you must arrange for a family member or a friend to drive you home.  Public transportation is prohibited.  You  will not be able to drive home if you were given anesthesia or sedation.    Patients who want to have their Post-op prescriptions filled from our in-house [facility], bring a Credit/Debit Card  or cash with you. A co-pay may be required.  The pharmacy closes at 5:30 pm.    Children under 18 years of age require a parent/guardian present the entire time that they are here.    Wear loose fitting clothes allowing for bandages.    Please leave money and valuables home.      You may bring your cell phone.    Call the doctor if fever or illness should occur before your surgery.    Call [phone] to contact us here if needed.

## 2025-06-11 ENCOUNTER — HOSPITAL ENCOUNTER (OUTPATIENT)
Dept: INTERVENTIONAL RADIOLOGY/VASCULAR | Facility: HOSPITAL | Age: 50
Discharge: HOME | End: 2025-06-11
Attending: FAMILY MEDICINE
Payer: MEDICAID

## 2025-06-11 VITALS
TEMPERATURE: 98 F | RESPIRATION RATE: 16 BRPM | DIASTOLIC BLOOD PRESSURE: 86 MMHG | OXYGEN SATURATION: 100 % | SYSTOLIC BLOOD PRESSURE: 103 MMHG | HEART RATE: 93 BPM

## 2025-06-11 DIAGNOSIS — R74.01 ELEVATED TRANSAMINASE LEVEL: ICD-10-CM

## 2025-06-11 DIAGNOSIS — R17 JAUNDICE: ICD-10-CM

## 2025-06-11 DIAGNOSIS — R74.8 ELEVATED LIVER ENZYMES: ICD-10-CM

## 2025-06-11 DIAGNOSIS — R74.8 ELEVATED ALKALINE PHOSPHATASE LEVEL: ICD-10-CM

## 2025-06-11 LAB
ALBUMIN SERPL BCP-MCNC: 3.8 G/DL (ref 3.5–5.2)
ALP SERPL-CCNC: 908 UNIT/L (ref 40–150)
ALT SERPL W/O P-5'-P-CCNC: 180 UNIT/L (ref 10–44)
ANION GAP (OHS): 11 MMOL/L (ref 8–16)
AST SERPL-CCNC: 142 UNIT/L (ref 11–45)
BILIRUB SERPL-MCNC: 1.1 MG/DL (ref 0.1–1)
BUN SERPL-MCNC: 13 MG/DL (ref 6–20)
CALCIUM SERPL-MCNC: 10.4 MG/DL (ref 8.7–10.5)
CHLORIDE SERPL-SCNC: 102 MMOL/L (ref 95–110)
CO2 SERPL-SCNC: 29 MMOL/L (ref 23–29)
CREAT SERPL-MCNC: 0.8 MG/DL (ref 0.5–1.4)
ERYTHROCYTE [DISTWIDTH] IN BLOOD BY AUTOMATED COUNT: 13.3 % (ref 11.5–14.5)
GFR SERPLBLD CREATININE-BSD FMLA CKD-EPI: >60 ML/MIN/1.73/M2
GLUCOSE SERPL-MCNC: 95 MG/DL (ref 70–110)
HCT VFR BLD AUTO: 42.6 % (ref 37–48.5)
HGB BLD-MCNC: 13.9 GM/DL (ref 12–16)
INR PPP: 0.9 (ref 0.8–1.2)
MCH RBC QN AUTO: 28.8 PG (ref 27–31)
MCHC RBC AUTO-ENTMCNC: 32.6 G/DL (ref 32–36)
MCV RBC AUTO: 88 FL (ref 82–98)
PLATELET # BLD AUTO: 206 K/UL (ref 150–450)
PMV BLD AUTO: 9.9 FL (ref 9.2–12.9)
POTASSIUM SERPL-SCNC: 4.1 MMOL/L (ref 3.5–5.1)
PROT SERPL-MCNC: 8 GM/DL (ref 6–8.4)
PROTHROMBIN TIME: 10.7 SECONDS (ref 9–12.5)
RBC # BLD AUTO: 4.82 M/UL (ref 4–5.4)
SODIUM SERPL-SCNC: 142 MMOL/L (ref 136–145)
WBC # BLD AUTO: 5.34 K/UL (ref 3.9–12.7)

## 2025-06-11 PROCEDURE — 85610 PROTHROMBIN TIME: CPT | Performed by: NURSE PRACTITIONER

## 2025-06-11 PROCEDURE — 63600175 PHARM REV CODE 636 W HCPCS: Performed by: INTERNAL MEDICINE

## 2025-06-11 PROCEDURE — 82040 ASSAY OF SERUM ALBUMIN: CPT | Performed by: NURSE PRACTITIONER

## 2025-06-11 PROCEDURE — C1729 CATH, DRAINAGE: HCPCS

## 2025-06-11 PROCEDURE — 25000003 PHARM REV CODE 250: Performed by: NURSE PRACTITIONER

## 2025-06-11 PROCEDURE — C1769 GUIDE WIRE: HCPCS

## 2025-06-11 PROCEDURE — 25500020 PHARM REV CODE 255: Performed by: INTERNAL MEDICINE

## 2025-06-11 PROCEDURE — 27201423 OPTIME MED/SURG SUP & DEVICES STERILE SUPPLY

## 2025-06-11 PROCEDURE — 85027 COMPLETE CBC AUTOMATED: CPT | Performed by: NURSE PRACTITIONER

## 2025-06-11 RX ORDER — CEFTRIAXONE 1 G/1
INJECTION, POWDER, FOR SOLUTION INTRAMUSCULAR; INTRAVENOUS
Status: COMPLETED | OUTPATIENT
Start: 2025-06-11 | End: 2025-06-11

## 2025-06-11 RX ORDER — OXYCODONE AND ACETAMINOPHEN 5; 325 MG/1; MG/1
1 TABLET ORAL ONCE
Refills: 0 | Status: COMPLETED | OUTPATIENT
Start: 2025-06-11 | End: 2025-06-11

## 2025-06-11 RX ORDER — LIDOCAINE HYDROCHLORIDE 10 MG/ML
1 INJECTION, SOLUTION EPIDURAL; INFILTRATION; INTRACAUDAL; PERINEURAL ONCE
Status: DISCONTINUED | OUTPATIENT
Start: 2025-06-11 | End: 2025-06-12 | Stop reason: HOSPADM

## 2025-06-11 RX ORDER — SODIUM CHLORIDE 9 MG/ML
INJECTION, SOLUTION INTRAVENOUS CONTINUOUS
Status: DISCONTINUED | OUTPATIENT
Start: 2025-06-11 | End: 2025-06-12 | Stop reason: HOSPADM

## 2025-06-11 RX ORDER — MIDAZOLAM HYDROCHLORIDE 2 MG/2ML
INJECTION, SOLUTION INTRAMUSCULAR; INTRAVENOUS
Status: COMPLETED | OUTPATIENT
Start: 2025-06-11 | End: 2025-06-11

## 2025-06-11 RX ORDER — FENTANYL CITRATE 50 UG/ML
INJECTION, SOLUTION INTRAMUSCULAR; INTRAVENOUS
Status: COMPLETED | OUTPATIENT
Start: 2025-06-11 | End: 2025-06-11

## 2025-06-11 RX ORDER — LIDOCAINE HYDROCHLORIDE 10 MG/ML
INJECTION, SOLUTION INFILTRATION; PERINEURAL
Status: COMPLETED | OUTPATIENT
Start: 2025-06-11 | End: 2025-06-11

## 2025-06-11 RX ADMIN — IOHEXOL 8 ML: 350 INJECTION, SOLUTION INTRAVENOUS at 10:06

## 2025-06-11 RX ADMIN — OXYCODONE HYDROCHLORIDE AND ACETAMINOPHEN 1 TABLET: 5; 325 TABLET ORAL at 11:06

## 2025-06-11 RX ADMIN — MIDAZOLAM HYDROCHLORIDE 1.5 MG: 1 INJECTION, SOLUTION INTRAMUSCULAR; INTRAVENOUS at 09:06

## 2025-06-11 RX ADMIN — CEFTRIAXONE SODIUM 1 G: 1 INJECTION, POWDER, FOR SOLUTION INTRAMUSCULAR; INTRAVENOUS at 09:06

## 2025-06-11 RX ADMIN — SODIUM CHLORIDE: 9 INJECTION, SOLUTION INTRAVENOUS at 07:06

## 2025-06-11 RX ADMIN — LIDOCAINE HYDROCHLORIDE 5 ML: 10 INJECTION, SOLUTION INFILTRATION; PERINEURAL at 09:06

## 2025-06-11 RX ADMIN — FENTANYL CITRATE 75 MCG: 50 INJECTION, SOLUTION INTRAMUSCULAR; INTRAVENOUS at 09:06

## 2025-06-11 NOTE — PROCEDURES
VIR Post-Procedure Note    Pre Op Diagnosis:  Biliary obstruction (benign biliary stricture)  Post Op Diagnosis: Same    Procedure:  Cholangiogram +  Biliary tube exchange     Procedure performed by: Vivi Hall MD      Written Informed Consent Obtained: Yes  Specimen Removed: No  Estimated Blood Loss: Minimal    Findings:        Successful exchange of  10 [place] Internal-external biliary drainage catheter.      Plan:    - Flush the drain with normal saline 10 cc daily    - Connect tube to bag drainage for 24 hours then cap.     - If drain is capped and pt starts to experience RUQ pain, N/V, jaundice, pericatheter leakage or fever, please immediately reconnect drainage bag to biliary drain tubing.      - Return to [facility] in 3 weeks for cholangiogram and externalization.         Vivi Hall MD  VIR

## 2025-06-11 NOTE — Clinical Note
Left: Abdomen.   Scrubbed with Chlorhexidine/Alcohol.    Hair: N/A.  Skin prep dry before draping.  Prepped by: Sixto Hay, RT 6/11/2025 9:35 AM.

## 2025-06-11 NOTE — DISCHARGE SUMMARY
VIR Discharge Summary      Hospital Course: No complications    Admit Date: 6/11/2025  Discharge Date: 06/11/2025     Instructions Given to Patient: Yes  Diet: Resume prior diet  Activity:   Activity as tolerated and no driving for today.    Description of Condition on Discharge: Stable  Vital Signs (Most Recent): Temp: 97.8 °F (36.6 °C) (06/11/25 0746)  Pulse: 99 (06/11/25 0958)  Resp: 14 (06/11/25 0958)  BP: 102/72 (06/11/25 0958)  SpO2: 100 % (06/11/25 0958)    Discharge Disposition: Home    Discharge Diagnosis: biliary stricture.      Follow-up: As scheduled. Will call patient with any follow up plans.        Vivi Hall MD  VIR

## 2025-06-11 NOTE — H&P
VIR Pre-Procedure H&P      SUBJECTIVE:          History of Present Illness:  Tiffanie Gao is a 49 y.o. female who presents for left biliary drain exchange.     Past Medical History:   Diagnosis Date    Asthma     COPD (chronic obstructive pulmonary disease)     Liver disease     Mental disorder      Past Surgical History:   Procedure Laterality Date    Bile duct reconstruction      CHOLECYSTECTOMY      HYSTERECTOMY      partial liver resection         Home Meds:   Prior to Admission medications    Medication Sig Start Date End Date Taking? Authorizing Provider   ALPRAZolam (XANAX) 0.5 MG tablet Take 0.5 mg by mouth daily as needed. 4/12/23  Yes Provider, Historical   CAPLYTA 42 mg Cap Take 1 capsule by mouth Daily. 5/1/25  Yes Provider, Historical   famotidine (PEPCID) 40 MG tablet Take 40 mg by mouth. 4/11/23  Yes Provider, Historical   ondansetron (ZOFRAN-ODT) 4 MG TbDL Take 4 mg by mouth every 6 (six) hours as needed. 8/12/22  Yes Provider, Historical   pantoprazole (PROTONIX) 40 MG tablet Take 40 mg by mouth every evening.   Yes Provider, Historical   rOPINIRole (REQUIP) 2 MG tablet Take 2 mg by mouth 2 (two) times daily. 5/8/25  Yes Provider, Historical   SYMBICORT 160-4.5 mcg/actuation HFAA Inhale 2 puffs into the lungs every 12 (twelve) hours. 5/17/22  Yes Provider, Historical   traZODone (DESYREL) 100 MG tablet Take 100 mg by mouth every evening.   Yes Provider, Historical   albuterol (PROVENTIL/VENTOLIN HFA) 90 mcg/actuation inhaler Inhale into the lungs. 5/17/22   Provider, Historical   albuterol-ipratropium (DUO-NEB) 2.5 mg-0.5 mg/3 mL nebulizer solution Inhale 3 mLs into the lungs every 6 (six) hours as needed.    Provider, Historical   alendronate (FOSAMAX) 70 MG tablet Take 70 mg by mouth every 7 days.    Provider, Historical          Allergies:   Review of patient's allergies indicates:   Allergen Reactions    Sumatriptan Nausea And Vomiting     Other reaction(s): vomiting            OBJECTIVE:      Vital Signs (Most Recent)  Temp: 97.8 °F (36.6 °C) (06/11/25 0746)  Pulse: 82 (06/11/25 0746)  Resp: 16 (06/11/25 0746)  BP: 98/71 (06/11/25 0746)  SpO2: 99 % (06/11/25 0746)    Physical Exam:  ASA: III  Mallampati: II    General: no acute distress  Mental Status: alert and oriented to person, place and time  HEENT: normocephalic, atraumatic  Chest: unlabored breathing  Heart: regular heart rate  Abdomen: Soft, non tender. Left biliary drain.   Extremity: moves all extremities    Laboratory  Lab Results   Component Value Date    INR 0.9 (L) 05/02/2025       Lab Results   Component Value Date    WBC 5.34 06/11/2025    HGB 13.9 06/11/2025    HCT 42.6 06/11/2025    MCV 88 06/11/2025     06/11/2025      Lab Results   Component Value Date    GLU 76 05/30/2025     05/30/2025    K 4.5 05/30/2025    CL 99 05/30/2025    CO2 27 05/30/2025    BUN 14 05/30/2025    CREATININE 0.9 05/30/2025    CALCIUM 10.2 05/30/2025    ALT 50 (H) 05/30/2025    AST 38 05/30/2025    ALBUMIN 4.2 05/30/2025    BILITOT 1.0 05/30/2025    BILIDIR 0.90 (H) 12/08/2019       Imaging    All recent imaging Independently reviewed by myself.       ASSESSMENT/PLAN:     Patient will undergo left biliary drain exchange..  Sedation Plan: moderate sedation.    Vivi Hall MD  VIR

## 2025-06-11 NOTE — DISCHARGE INSTRUCTIONS
DIET: You may resume your home diet. If nausea is present, increase your diet gradually with fluids and bland  Foods    ACTIVITY LEVEL: You have received sedation or an anesthetic, you may feel sleepy for several hours. Rest until  you are more awake. Gradually resume your normal activities    Medications: Pain medication should be taken only if needed and as directed. If antibiotics are prescribed, the  medication should be taken until completed.    No driving, alcoholic beverages or signing legal documents for next 24 hours or while taking pain  medication.    CALL THE DOCTOR:  For any obvious bleeding (some dried blood over the incision is normal).  Redness, swelling, foul smell around incision or fever over 101.  Shortness of breath, Coughing up Bloody sputum, Pains or Swelling in your Calves .  Persistent pain or nausea not relieved by medication.    Contact us with any questions or concerns:    INTERVENTIONAL RADIOLOGY  [phone]   [facility] paging :  [phone]  Ask for the interventional radiologist on call   If not available call your Attending surgeon    If any unusual problems or difficulties occur contact your doctor. If you cannot contact your doctor but  feel your signs and symptoms warrant a physicians attention return to the emergency room.      Please contact your referring provider for results.

## 2025-06-12 RX ORDER — LIDOCAINE HYDROCHLORIDE 10 MG/ML
1 INJECTION, SOLUTION EPIDURAL; INFILTRATION; INTRACAUDAL; PERINEURAL ONCE
Status: CANCELLED | OUTPATIENT
Start: 2025-06-12 | End: 2025-06-12

## 2025-06-13 ENCOUNTER — NURSE TRIAGE (OUTPATIENT)
Dept: ADMINISTRATIVE | Facility: CLINIC | Age: 50
End: 2025-06-13
Payer: MEDICAID

## 2025-06-13 NOTE — TELEPHONE ENCOUNTER
Tube exchange on 6/11. (Biliary tube exchange) States last night she thinks she saw some pus at insertion site. States today there is a creamy drainage. Some redness and swelling at site. Adds thinks redness is irritation/ reaction to initial dressing she had on.  Drain was first placed May 26. Pain 7/10 today. Prior to exchange pain 10/10. States she has not had any fever. States drainage in tube looks normal. Asking if she needs to go in for eval. Pt states she has a number to IR directly that she was given but she did not call that. Advised NT will try to reach out to IR.     [phone] (Number pt was given to call IR directly for issues)    Spoke with Michael in IR. Pt warm transferred to IR for care directives. No additional triage.       Reason for Disposition   [1] Swelling around the drain site AND [2] new-onset or getting worse   Pus or cloudy bad-smelling fluid leaking from the drain site    Additional Information   Negative: Shock suspected (e.g., cold/pale/clammy skin, too weak to stand, low BP, rapid pulse)   Negative: Sounds like a life-threatening emergency to the triager   Negative: [1] Bleeding around the drain site AND [2] won't stop after 10 minutes of direct pressure   Negative: [1] Bright red bleeding or bloody discharge from drain (Scotty-Davila, Hemovac, Penrose) AND [2] new or worsening  (Exception: Flecks of blood, pink-tinged drainage, or unchanged from normal.)   Negative: [1] Widespread rash AND [2] bright red, sunburn-like   Negative: SEVERE pain (e.g., excruciating) at drain site   Negative: Patient sounds very sick or weak to the triager   Negative: Fever > 100.4 F (38.0 C)   Negative: Pus or bad-smelling fluid in the Scotty-Davila or Hemovac   Negative: [1] Pus or bad-smelling fluid draining from Penrose AND [2] new-onset or getting WORSE   Negative: [1] Red streak runs from the drain site AND [2] longer than 1 inch (2.5 cm)   Negative: [1] Skin redness at drain site AND [2] size >  2 inches (5 cm)    Protocols used: Post-Op Wound Drains and Tubes - Symptoms and [id number]

## 2025-06-16 ENCOUNTER — TELEPHONE (OUTPATIENT)
Dept: INTERVENTIONAL RADIOLOGY/VASCULAR | Facility: HOSPITAL | Age: 50
End: 2025-06-16
Payer: MEDICAID

## 2025-06-30 ENCOUNTER — HOSPITAL ENCOUNTER (OUTPATIENT)
Dept: INTERVENTIONAL RADIOLOGY/VASCULAR | Facility: HOSPITAL | Age: 50
Discharge: HOME | End: 2025-06-30
Attending: NURSE PRACTITIONER
Payer: MEDICAID

## 2025-06-30 VITALS
OXYGEN SATURATION: 100 % | HEART RATE: 90 BPM | DIASTOLIC BLOOD PRESSURE: 65 MMHG | SYSTOLIC BLOOD PRESSURE: 105 MMHG | RESPIRATION RATE: 16 BRPM | TEMPERATURE: 98 F

## 2025-06-30 DIAGNOSIS — K83.1 BILIARY STRICTURE: ICD-10-CM

## 2025-06-30 DIAGNOSIS — R74.8 ELEVATED LIVER ENZYMES: ICD-10-CM

## 2025-06-30 PROCEDURE — C1769 GUIDE WIRE: HCPCS

## 2025-06-30 PROCEDURE — C1894 INTRO/SHEATH, NON-LASER: HCPCS

## 2025-06-30 PROCEDURE — 25500020 PHARM REV CODE 255: Performed by: INTERNAL MEDICINE

## 2025-06-30 PROCEDURE — C1729 CATH, DRAINAGE: HCPCS

## 2025-06-30 PROCEDURE — 99152 MOD SED SAME PHYS/QHP 5/>YRS: CPT

## 2025-06-30 PROCEDURE — 63600175 PHARM REV CODE 636 W HCPCS: Performed by: INTERNAL MEDICINE

## 2025-06-30 PROCEDURE — 25000003 PHARM REV CODE 250: Performed by: NURSE PRACTITIONER

## 2025-06-30 RX ORDER — CEFTRIAXONE 1 G/1
INJECTION, POWDER, FOR SOLUTION INTRAMUSCULAR; INTRAVENOUS
Status: COMPLETED | OUTPATIENT
Start: 2025-06-30 | End: 2025-06-30

## 2025-06-30 RX ORDER — OXYCODONE HYDROCHLORIDE 5 MG/1
5 TABLET ORAL ONCE
Refills: 0 | Status: COMPLETED | OUTPATIENT
Start: 2025-06-30 | End: 2025-06-30

## 2025-06-30 RX ORDER — FENTANYL CITRATE 50 UG/ML
INJECTION, SOLUTION INTRAMUSCULAR; INTRAVENOUS
Status: COMPLETED | OUTPATIENT
Start: 2025-06-30 | End: 2025-06-30

## 2025-06-30 RX ORDER — SODIUM CHLORIDE 9 MG/ML
INJECTION, SOLUTION INTRAVENOUS CONTINUOUS
Status: DISCONTINUED | OUTPATIENT
Start: 2025-06-30 | End: 2025-07-01 | Stop reason: HOSPADM

## 2025-06-30 RX ORDER — MIDAZOLAM HYDROCHLORIDE 2 MG/2ML
INJECTION, SOLUTION INTRAMUSCULAR; INTRAVENOUS
Status: COMPLETED | OUTPATIENT
Start: 2025-06-30 | End: 2025-06-30

## 2025-06-30 RX ORDER — LIDOCAINE HYDROCHLORIDE 10 MG/ML
INJECTION, SOLUTION INFILTRATION; PERINEURAL
Status: COMPLETED | OUTPATIENT
Start: 2025-06-30 | End: 2025-06-30

## 2025-06-30 RX ADMIN — FENTANYL CITRATE 50 MCG: 50 INJECTION, SOLUTION INTRAMUSCULAR; INTRAVENOUS at 08:06

## 2025-06-30 RX ADMIN — MIDAZOLAM HYDROCHLORIDE 1 MG: 1 INJECTION, SOLUTION INTRAMUSCULAR; INTRAVENOUS at 08:06

## 2025-06-30 RX ADMIN — IOHEXOL 12 ML: 350 INJECTION, SOLUTION INTRAVENOUS at 09:06

## 2025-06-30 RX ADMIN — CEFTRIAXONE SODIUM 1 G: 1 INJECTION, POWDER, FOR SOLUTION INTRAMUSCULAR; INTRAVENOUS at 08:06

## 2025-06-30 RX ADMIN — LIDOCAINE HYDROCHLORIDE 2 ML: 10 INJECTION, SOLUTION INFILTRATION; PERINEURAL at 08:06

## 2025-06-30 RX ADMIN — SODIUM CHLORIDE: 9 INJECTION, SOLUTION INTRAVENOUS at 07:06

## 2025-06-30 RX ADMIN — OXYCODONE HYDROCHLORIDE 5 MG: 5 TABLET ORAL at 09:06

## 2025-06-30 NOTE — Clinical Note
Left: Abdomen.   Scrubbed with Chlorhexidine/Alcohol.    Hair: N/A.  Skin prep dry before draping.  Prepped by: Angela Low RT 6/30/2025 8:28 AM.

## 2025-06-30 NOTE — H&P
VIR Pre-Procedure H&P      SUBJECTIVE:          History of Present Illness:  Tiffanie Gao is a 49 y.o. female who presents for cholangiogram & possible biliary tube exchange.     Past Medical History:   Diagnosis Date    Asthma     COPD (chronic obstructive pulmonary disease)     Liver disease     Mental disorder      Past Surgical History:   Procedure Laterality Date    Bile duct reconstruction      CHOLECYSTECTOMY      HYSTERECTOMY      partial liver resection         Home Meds:   Prior to Admission medications    Medication Sig Start Date End Date Taking? Authorizing Provider   albuterol (PROVENTIL/VENTOLIN HFA) 90 mcg/actuation inhaler Inhale into the lungs. 5/17/22  Yes Provider, Historical   alendronate (FOSAMAX) 70 MG tablet Take 70 mg by mouth every 7 days.   Yes Provider, Historical   ALPRAZolam (XANAX) 0.5 MG tablet Take 0.5 mg by mouth daily as needed. 4/12/23  Yes Provider, Historical   CAPLYTA 42 mg Cap Take 1 capsule by mouth Daily. 5/1/25  Yes Provider, Historical   famotidine (PEPCID) 40 MG tablet Take 40 mg by mouth. 4/11/23  Yes Provider, Historical   ondansetron (ZOFRAN-ODT) 4 MG TbDL Take 4 mg by mouth every 6 (six) hours as needed. 8/12/22  Yes Provider, Historical   pantoprazole (PROTONIX) 40 MG tablet Take 40 mg by mouth every evening.   Yes Provider, Historical   rOPINIRole (REQUIP) 2 MG tablet Take 2 mg by mouth 2 (two) times daily. 5/8/25  Yes Provider, Historical   SYMBICORT 160-4.5 mcg/actuation HFAA Inhale 2 puffs into the lungs every 12 (twelve) hours. 5/17/22  Yes Provider, Historical   traZODone (DESYREL) 100 MG tablet Take 100 mg by mouth every evening.   Yes Provider, Historical   albuterol-ipratropium (DUO-NEB) 2.5 mg-0.5 mg/3 mL nebulizer solution Inhale 3 mLs into the lungs every 6 (six) hours as needed.    Provider, Historical          Allergies:   Review of patient's allergies indicates:   Allergen Reactions    Sumatriptan Nausea And Vomiting     Other reaction(s):  vomiting          OBJECTIVE:     Vital Signs (Most Recent)  Temp: 97.7 °F (36.5 °C) (06/30/25 0711)  Pulse: 79 (06/30/25 0711)  Resp: 16 (06/30/25 0711)  BP: (!) 114/54 (06/30/25 0711)  SpO2: 97 % (06/30/25 0711)    Physical Exam:  ASA: III  Mallampati: II    General: no acute distress  Mental Status: alert and oriented to person, place and time  HEENT: normocephalic, atraumatic  Chest: unlabored breathing  Heart: regular heart rate  Abdomen: Soft, non tender. Biliary tube, site D/C/I  Extremity: moves all extremities    Laboratory  Lab Results   Component Value Date    INR 0.9 06/11/2025       Lab Results   Component Value Date    WBC 5.34 06/11/2025    HGB 13.9 06/11/2025    HCT 42.6 06/11/2025    MCV 88 06/11/2025     06/11/2025      Lab Results   Component Value Date    GLU 95 06/11/2025     06/11/2025    K 4.1 06/11/2025     06/11/2025    CO2 29 06/11/2025    BUN 13 06/11/2025    CREATININE 0.8 06/11/2025    CALCIUM 10.4 06/11/2025     (H) 06/11/2025     (H) 06/11/2025    ALBUMIN 3.8 06/11/2025    BILITOT 1.1 (H) 06/11/2025    BILIDIR 0.90 (H) 12/08/2019       Imaging    All recent imaging Independently reviewed by myself.       ASSESSMENT/PLAN:     Patient will undergo cholangiogram & possible tube externalization.  Sedation Plan: moderate sedation.     Vivi Hall MD  VIR

## 2025-06-30 NOTE — PLAN OF CARE
Procedure completed, pt tolerated without s/sx of complication. Dressing applied CDI. Patient to be transported to [facility] for recovery. Report called to LINDSAY Merida.

## 2025-06-30 NOTE — PROCEDURES
VIR Post-Procedure Note    Pre Op Diagnosis:  Biliary obstruction (benign biliary stricture)  Post Op Diagnosis: Same    Procedure:  Cholangiogram +  Biliary tube externalization      Procedure performed by: Vivi Hall MD      Written Informed Consent Obtained: Yes  Specimen Removed: No  Estimated Blood Loss: Minimal    Findings:     Cholangiogram showed brisk flow of bile into the bowel despite narrow biliary anastomosis.    Tube externalized to a 10 Fr drain.       Plan:    - Flush the drain with normal saline 10 cc daily    - Connect tube to bag drainage for 24 hours then cap.     - Will do capping trial for one week. Schedule for cholangiogram & possible removal  next week.     - If drain is capped and pt starts to experience RUQ pain, N/V, jaundice, pericatheter leakage or fever, please immediately reconnect drainage bag to biliary drain tubing.          Vivi Hall MD  VIR

## 2025-06-30 NOTE — DISCHARGE INSTRUCTIONS
DIET: You may resume your home diet. If nausea is present, increase your diet gradually with fluids and bland  Foods    ACTIVITY LEVEL: You have received sedation or an anesthetic, you may feel sleepy for several hours. Rest until  you are more awake. Gradually resume your normal activities    Medications: Pain medication should be taken only if needed and as directed. If antibiotics are prescribed, the  medication should be taken until completed.    No driving, alcoholic beverages or signing legal documents for next 24 hours or while taking pain  medication.    CALL THE DOCTOR:  For any obvious bleeding (some dried blood over the incision is normal).  Redness, swelling, foul smell around incision or fever over 101.  Shortness of breath, Coughing up Bloody sputum, Pains or Swelling in your Calves .  Persistent pain or nausea not relieved by medication.    Contact us with any questions or concerns:    INTERVENTIONAL RADIOLOGY  [phone]   [facility] paging :  [phone]  Ask for the interventional radiologist on call   If not available call your Attending surgeon    If any unusual problems or difficulties occur contact your doctor. If you cannot contact your doctor but  feel your signs and symptoms warrant a physicians attention return to the emergency room.      Please contact your referring provider for results.

## 2025-06-30 NOTE — DISCHARGE SUMMARY
VIR Discharge Summary      Hospital Course: No complications    Admit Date: 6/30/2025  Discharge Date: 06/30/2025     Instructions Given to Patient: Yes  Diet: Resume prior diet  Activity:   Activity as tolerated and no driving for today.    Description of Condition on Discharge: Stable  Vital Signs (Most Recent): Temp: 97.7 °F (36.5 °C) (06/30/25 0711)  Pulse: 88 (06/30/25 0902)  Resp: 14 (06/30/25 0902)  BP: 105/65 (06/30/25 0902)  SpO2: 100 % (06/30/25 0902)    Discharge Disposition: Home    Discharge Diagnosis: biliary stricture.       Follow-up: As scheduled. Will call patient with any follow up plans.        Vivi Hall MD  VIR

## 2025-07-02 ENCOUNTER — TELEPHONE (OUTPATIENT)
Dept: INTERVENTIONAL RADIOLOGY/VASCULAR | Facility: HOSPITAL | Age: 50
End: 2025-07-02
Payer: MEDICAID

## 2025-07-03 ENCOUNTER — HOSPITAL ENCOUNTER (INPATIENT)
Facility: HOSPITAL | Age: 50
LOS: 5 days | Discharge: HOME HEALTH | DRG: 919 | End: 2025-07-08
Attending: STUDENT IN AN ORGANIZED HEALTH CARE EDUCATION/TRAINING PROGRAM | Admitting: FAMILY MEDICINE
Payer: MEDICAID

## 2025-07-03 DIAGNOSIS — R07.9 CHEST PAIN: ICD-10-CM

## 2025-07-03 DIAGNOSIS — R78.81 BACTEREMIA DUE TO ESCHERICHIA COLI: ICD-10-CM

## 2025-07-03 DIAGNOSIS — Z16.12 INFECTION DUE TO ESBL-PRODUCING ESCHERICHIA COLI: ICD-10-CM

## 2025-07-03 DIAGNOSIS — A41.9 SEPSIS: ICD-10-CM

## 2025-07-03 DIAGNOSIS — A49.8 INFECTION DUE TO ESBL-PRODUCING ESCHERICHIA COLI: ICD-10-CM

## 2025-07-03 DIAGNOSIS — A41.9 SEPSIS, DUE TO UNSPECIFIED ORGANISM, UNSPECIFIED WHETHER ACUTE ORGAN DYSFUNCTION PRESENT: Primary | ICD-10-CM

## 2025-07-03 DIAGNOSIS — B96.20 BACTEREMIA DUE TO ESCHERICHIA COLI: ICD-10-CM

## 2025-07-03 PROBLEM — F41.9 ANXIETY: Status: ACTIVE | Noted: 2025-07-03

## 2025-07-03 PROBLEM — T85.520A BILIARY DRAIN DISPLACEMENT: Status: ACTIVE | Noted: 2025-07-03

## 2025-07-03 LAB
ABSOLUTE EOSINOPHIL (OHS): 0.05 K/UL
ABSOLUTE MONOCYTE (OHS): 0.59 K/UL (ref 0.3–1)
ABSOLUTE NEUTROPHIL COUNT (OHS): 10.52 K/UL (ref 1.8–7.7)
ALBUMIN SERPL BCP-MCNC: 2.8 G/DL (ref 3.5–5.2)
ALP SERPL-CCNC: 379 UNIT/L (ref 40–150)
ALT SERPL W/O P-5'-P-CCNC: 31 UNIT/L (ref 10–44)
ANION GAP (OHS): 8 MMOL/L (ref 8–16)
AST SERPL-CCNC: 36 UNIT/L (ref 11–45)
BASOPHILS # BLD AUTO: 0.04 K/UL
BASOPHILS NFR BLD AUTO: 0.3 %
BILIRUB SERPL-MCNC: 1.4 MG/DL (ref 0.1–1)
BILIRUB UR QL STRIP.AUTO: NEGATIVE
BUN SERPL-MCNC: 11 MG/DL (ref 6–20)
CALCIUM SERPL-MCNC: 8.1 MG/DL (ref 8.7–10.5)
CHLORIDE SERPL-SCNC: 108 MMOL/L (ref 95–110)
CLARITY UR: CLEAR
CO2 SERPL-SCNC: 23 MMOL/L (ref 23–29)
COLOR UR AUTO: YELLOW
CREAT SERPL-MCNC: 0.7 MG/DL (ref 0.5–1.4)
ERYTHROCYTE [DISTWIDTH] IN BLOOD BY AUTOMATED COUNT: 13.9 % (ref 11.5–14.5)
GFR SERPLBLD CREATININE-BSD FMLA CKD-EPI: >60 ML/MIN/1.73/M2
GLUCOSE SERPL-MCNC: 86 MG/DL (ref 70–110)
GLUCOSE UR QL STRIP: NEGATIVE
HCT VFR BLD AUTO: 35.6 % (ref 37–48.5)
HGB BLD-MCNC: 11.8 GM/DL (ref 12–16)
HGB UR QL STRIP: NEGATIVE
IMM GRANULOCYTES # BLD AUTO: 0.02 K/UL (ref 0–0.04)
IMM GRANULOCYTES NFR BLD AUTO: 0.2 % (ref 0–0.5)
INR PPP: 1.1 (ref 0.8–1.2)
KETONES UR QL STRIP: ABNORMAL
LACTATE SERPL-SCNC: 1 MMOL/L (ref 0.5–2.2)
LEUKOCYTE ESTERASE UR QL STRIP: NEGATIVE
LYMPHOCYTES # BLD AUTO: 0.8 K/UL (ref 1–4.8)
MAGNESIUM SERPL-MCNC: 1.5 MG/DL (ref 1.6–2.6)
MCH RBC QN AUTO: 29.4 PG (ref 27–31)
MCHC RBC AUTO-ENTMCNC: 33.1 G/DL (ref 32–36)
MCV RBC AUTO: 89 FL (ref 82–98)
NITRITE UR QL STRIP: NEGATIVE
NUCLEATED RBC (/100WBC) (OHS): 0 /100 WBC
PH UR STRIP: 7 [PH]
PLATELET # BLD AUTO: 229 K/UL (ref 150–450)
PMV BLD AUTO: 10 FL (ref 9.2–12.9)
POTASSIUM SERPL-SCNC: 4 MMOL/L (ref 3.5–5.1)
PROCALCITONIN SERPL-MCNC: 3.23 NG/ML
PROT SERPL-MCNC: 5.9 GM/DL (ref 6–8.4)
PROT UR QL STRIP: NEGATIVE
PROTHROMBIN TIME: 12 SECONDS (ref 9–12.5)
RBC # BLD AUTO: 4.01 M/UL (ref 4–5.4)
RELATIVE EOSINOPHIL (OHS): 0.4 %
RELATIVE LYMPHOCYTE (OHS): 6.7 % (ref 18–48)
RELATIVE MONOCYTE (OHS): 4.9 % (ref 4–15)
RELATIVE NEUTROPHIL (OHS): 87.5 % (ref 38–73)
SODIUM SERPL-SCNC: 139 MMOL/L (ref 136–145)
SP GR UR STRIP: 1.03
UROBILINOGEN UR STRIP-ACNC: ABNORMAL EU/DL
WBC # BLD AUTO: 12.02 K/UL (ref 3.9–12.7)

## 2025-07-03 PROCEDURE — 63600175 PHARM REV CODE 636 W HCPCS: Performed by: HOSPITALIST

## 2025-07-03 PROCEDURE — 85025 COMPLETE CBC W/AUTO DIFF WBC: CPT | Performed by: HOSPITALIST

## 2025-07-03 PROCEDURE — 25500020 PHARM REV CODE 255: Performed by: HOSPITALIST

## 2025-07-03 PROCEDURE — 87154 CUL TYP ID BLD PTHGN 6+ TRGT: CPT | Performed by: HOSPITALIST

## 2025-07-03 PROCEDURE — 94640 AIRWAY INHALATION TREATMENT: CPT

## 2025-07-03 PROCEDURE — 82565 ASSAY OF CREATININE: CPT | Performed by: HOSPITALIST

## 2025-07-03 PROCEDURE — 87077 CULTURE AEROBIC IDENTIFY: CPT | Performed by: HOSPITALIST

## 2025-07-03 PROCEDURE — 36415 COLL VENOUS BLD VENIPUNCTURE: CPT | Performed by: HOSPITALIST

## 2025-07-03 PROCEDURE — 84145 PROCALCITONIN (PCT): CPT | Performed by: HOSPITALIST

## 2025-07-03 PROCEDURE — 85610 PROTHROMBIN TIME: CPT | Performed by: HOSPITALIST

## 2025-07-03 PROCEDURE — 81003 URINALYSIS AUTO W/O SCOPE: CPT | Performed by: HOSPITALIST

## 2025-07-03 PROCEDURE — 25000003 PHARM REV CODE 250: Performed by: HOSPITALIST

## 2025-07-03 PROCEDURE — 25000242 PHARM REV CODE 250 ALT 637 W/ HCPCS: Performed by: HOSPITALIST

## 2025-07-03 PROCEDURE — 83605 ASSAY OF LACTIC ACID: CPT | Performed by: HOSPITALIST

## 2025-07-03 PROCEDURE — 83735 ASSAY OF MAGNESIUM: CPT | Performed by: HOSPITALIST

## 2025-07-03 PROCEDURE — 21400001 HC TELEMETRY ROOM

## 2025-07-03 RX ORDER — IBUPROFEN 200 MG
24 TABLET ORAL
Status: DISCONTINUED | OUTPATIENT
Start: 2025-07-03 | End: 2025-07-08 | Stop reason: HOSPADM

## 2025-07-03 RX ORDER — SODIUM CHLORIDE 9 MG/ML
INJECTION, SOLUTION INTRAVENOUS CONTINUOUS
Status: ACTIVE | OUTPATIENT
Start: 2025-07-03 | End: 2025-07-04

## 2025-07-03 RX ORDER — ARFORMOTEROL TARTRATE 15 UG/2ML
15 SOLUTION RESPIRATORY (INHALATION) 2 TIMES DAILY
Status: DISCONTINUED | OUTPATIENT
Start: 2025-07-03 | End: 2025-07-08 | Stop reason: HOSPADM

## 2025-07-03 RX ORDER — FAMOTIDINE 20 MG/1
40 TABLET, FILM COATED ORAL DAILY
Status: DISCONTINUED | OUTPATIENT
Start: 2025-07-03 | End: 2025-07-03

## 2025-07-03 RX ORDER — TRAZODONE HYDROCHLORIDE 100 MG/1
100 TABLET ORAL NIGHTLY
Status: DISCONTINUED | OUTPATIENT
Start: 2025-07-03 | End: 2025-07-08 | Stop reason: HOSPADM

## 2025-07-03 RX ORDER — ALPRAZOLAM 0.5 MG/1
0.5 TABLET ORAL 2 TIMES DAILY PRN
Status: DISCONTINUED | OUTPATIENT
Start: 2025-07-03 | End: 2025-07-08 | Stop reason: HOSPADM

## 2025-07-03 RX ORDER — ONDANSETRON HYDROCHLORIDE 2 MG/ML
4 INJECTION, SOLUTION INTRAVENOUS EVERY 6 HOURS PRN
Status: DISCONTINUED | OUTPATIENT
Start: 2025-07-03 | End: 2025-07-08 | Stop reason: HOSPADM

## 2025-07-03 RX ORDER — MORPHINE SULFATE 4 MG/ML
2 INJECTION, SOLUTION INTRAMUSCULAR; INTRAVENOUS
Status: DISCONTINUED | OUTPATIENT
Start: 2025-07-03 | End: 2025-07-08 | Stop reason: HOSPADM

## 2025-07-03 RX ORDER — ROPINIROLE 1 MG/1
2 TABLET, FILM COATED ORAL 2 TIMES DAILY
Status: DISCONTINUED | OUTPATIENT
Start: 2025-07-03 | End: 2025-07-08 | Stop reason: HOSPADM

## 2025-07-03 RX ORDER — TALC
6 POWDER (GRAM) TOPICAL NIGHTLY PRN
Status: DISCONTINUED | OUTPATIENT
Start: 2025-07-03 | End: 2025-07-08 | Stop reason: HOSPADM

## 2025-07-03 RX ORDER — ARFORMOTEROL TARTRATE 15 UG/2ML
15 SOLUTION RESPIRATORY (INHALATION) 2 TIMES DAILY
Status: DISCONTINUED | OUTPATIENT
Start: 2025-07-03 | End: 2025-07-03

## 2025-07-03 RX ORDER — SIMETHICONE 80 MG
1 TABLET,CHEWABLE ORAL 4 TIMES DAILY PRN
Status: DISCONTINUED | OUTPATIENT
Start: 2025-07-03 | End: 2025-07-08 | Stop reason: HOSPADM

## 2025-07-03 RX ORDER — GLUCAGON 1 MG
1 KIT INJECTION
Status: DISCONTINUED | OUTPATIENT
Start: 2025-07-03 | End: 2025-07-08 | Stop reason: HOSPADM

## 2025-07-03 RX ORDER — FAMOTIDINE 20 MG/1
20 TABLET, FILM COATED ORAL 2 TIMES DAILY
Status: DISCONTINUED | OUTPATIENT
Start: 2025-07-03 | End: 2025-07-08 | Stop reason: HOSPADM

## 2025-07-03 RX ORDER — FLUTICASONE FUROATE AND VILANTEROL 100; 25 UG/1; UG/1
1 POWDER RESPIRATORY (INHALATION) DAILY
Status: DISCONTINUED | OUTPATIENT
Start: 2025-07-03 | End: 2025-07-03

## 2025-07-03 RX ORDER — OXYCODONE HYDROCHLORIDE 5 MG/1
5 TABLET ORAL EVERY 4 HOURS PRN
Refills: 0 | Status: DISCONTINUED | OUTPATIENT
Start: 2025-07-03 | End: 2025-07-08 | Stop reason: HOSPADM

## 2025-07-03 RX ORDER — PANTOPRAZOLE SODIUM 40 MG/1
40 TABLET, DELAYED RELEASE ORAL NIGHTLY
Status: DISCONTINUED | OUTPATIENT
Start: 2025-07-03 | End: 2025-07-08 | Stop reason: HOSPADM

## 2025-07-03 RX ORDER — BUDESONIDE 0.5 MG/2ML
0.5 INHALANT ORAL EVERY 12 HOURS
Status: DISCONTINUED | OUTPATIENT
Start: 2025-07-03 | End: 2025-07-08 | Stop reason: HOSPADM

## 2025-07-03 RX ORDER — ALUMINUM HYDROXIDE, MAGNESIUM HYDROXIDE, AND SIMETHICONE 1200; 120; 1200 MG/30ML; MG/30ML; MG/30ML
30 SUSPENSION ORAL
Status: DISCONTINUED | OUTPATIENT
Start: 2025-07-03 | End: 2025-07-08 | Stop reason: HOSPADM

## 2025-07-03 RX ORDER — NALOXONE HCL 0.4 MG/ML
0.02 VIAL (ML) INJECTION
Status: DISCONTINUED | OUTPATIENT
Start: 2025-07-03 | End: 2025-07-08 | Stop reason: HOSPADM

## 2025-07-03 RX ORDER — SODIUM CHLORIDE 0.9 % (FLUSH) 0.9 %
10 SYRINGE (ML) INJECTION EVERY 12 HOURS PRN
Status: DISCONTINUED | OUTPATIENT
Start: 2025-07-03 | End: 2025-07-08 | Stop reason: HOSPADM

## 2025-07-03 RX ORDER — IPRATROPIUM BROMIDE AND ALBUTEROL SULFATE 2.5; .5 MG/3ML; MG/3ML
3 SOLUTION RESPIRATORY (INHALATION) EVERY 6 HOURS PRN
Status: DISCONTINUED | OUTPATIENT
Start: 2025-07-03 | End: 2025-07-08 | Stop reason: HOSPADM

## 2025-07-03 RX ORDER — SUCRALFATE 1 G/10ML
1 SUSPENSION ORAL EVERY 6 HOURS
Status: DISCONTINUED | OUTPATIENT
Start: 2025-07-03 | End: 2025-07-08 | Stop reason: HOSPADM

## 2025-07-03 RX ORDER — IBUPROFEN 200 MG
16 TABLET ORAL
Status: DISCONTINUED | OUTPATIENT
Start: 2025-07-03 | End: 2025-07-08 | Stop reason: HOSPADM

## 2025-07-03 RX ADMIN — PANTOPRAZOLE SODIUM 40 MG: 40 TABLET, DELAYED RELEASE ORAL at 08:07

## 2025-07-03 RX ADMIN — SODIUM CHLORIDE 1000 ML: 9 INJECTION, SOLUTION INTRAVENOUS at 01:07

## 2025-07-03 RX ADMIN — MORPHINE SULFATE 2 MG: 4 INJECTION INTRAVENOUS at 10:07

## 2025-07-03 RX ADMIN — IOHEXOL 10 ML: 300 INJECTION, SOLUTION INTRAVENOUS at 02:07

## 2025-07-03 RX ADMIN — OXYCODONE HYDROCHLORIDE 5 MG: 5 TABLET ORAL at 02:07

## 2025-07-03 RX ADMIN — ARFORMOTEROL TARTRATE 15 MCG: 15 SOLUTION RESPIRATORY (INHALATION) at 07:07

## 2025-07-03 RX ADMIN — ROPINIROLE HYDROCHLORIDE 2 MG: 1 TABLET, FILM COATED ORAL at 08:07

## 2025-07-03 RX ADMIN — ALUMINA, MAGNESIA, AND SIMETHICONE ORAL SUSPENSION REGULAR STRENGTH 30 ML: 1200; 1200; 120 SUSPENSION ORAL at 05:07

## 2025-07-03 RX ADMIN — OXYCODONE HYDROCHLORIDE 5 MG: 5 TABLET ORAL at 08:07

## 2025-07-03 RX ADMIN — ALUMINA, MAGNESIA, AND SIMETHICONE ORAL SUSPENSION REGULAR STRENGTH 30 ML: 1200; 1200; 120 SUSPENSION ORAL at 08:07

## 2025-07-03 RX ADMIN — ONDANSETRON 4 MG: 2 INJECTION INTRAMUSCULAR; INTRAVENOUS at 09:07

## 2025-07-03 RX ADMIN — PIPERACILLIN SODIUM AND TAZOBACTAM SODIUM 4.5 G: 4; .5 INJECTION, POWDER, FOR SOLUTION INTRAVENOUS at 09:07

## 2025-07-03 RX ADMIN — ROPINIROLE HYDROCHLORIDE 2 MG: 1 TABLET, FILM COATED ORAL at 09:07

## 2025-07-03 RX ADMIN — OXYCODONE HYDROCHLORIDE 5 MG: 5 TABLET ORAL at 09:07

## 2025-07-03 RX ADMIN — ALUMINA, MAGNESIA, AND SIMETHICONE ORAL SUSPENSION REGULAR STRENGTH 30 ML: 1200; 1200; 120 SUSPENSION ORAL at 10:07

## 2025-07-03 RX ADMIN — FAMOTIDINE 20 MG: 20 TABLET, FILM COATED ORAL at 08:07

## 2025-07-03 RX ADMIN — SODIUM CHLORIDE: 9 INJECTION, SOLUTION INTRAVENOUS at 09:07

## 2025-07-03 RX ADMIN — BUDESONIDE INHALATION 0.5 MG: 0.5 SUSPENSION RESPIRATORY (INHALATION) at 07:07

## 2025-07-03 RX ADMIN — SUCRALFATE 1 G: 1 SUSPENSION ORAL at 05:07

## 2025-07-03 RX ADMIN — FAMOTIDINE 20 MG: 20 TABLET, FILM COATED ORAL at 09:07

## 2025-07-03 RX ADMIN — TRAZODONE HYDROCHLORIDE 100 MG: 100 TABLET ORAL at 08:07

## 2025-07-03 RX ADMIN — PIPERACILLIN SODIUM AND TAZOBACTAM SODIUM 4.5 G: 4; .5 INJECTION, POWDER, FOR SOLUTION INTRAVENOUS at 05:07

## 2025-07-03 RX ADMIN — SUCRALFATE 1 G: 1 SUSPENSION ORAL at 01:07

## 2025-07-03 NOTE — SUBJECTIVE & OBJECTIVE
Past Medical History:   Diagnosis Date    Asthma     COPD (chronic obstructive pulmonary disease)     Liver disease     Mental disorder        Past Surgical History:   Procedure Laterality Date    Bile duct reconstruction      CHOLECYSTECTOMY      HYSTERECTOMY      partial liver resection         Review of patient's allergies indicates:   Allergen Reactions    Sumatriptan Nausea And Vomiting     Other reaction(s): vomiting       No current facility-administered medications on file prior to encounter.     Current Outpatient Medications on File Prior to Encounter   Medication Sig    albuterol (PROVENTIL/VENTOLIN HFA) 90 mcg/actuation inhaler Inhale into the lungs.    albuterol-ipratropium (DUO-NEB) 2.5 mg-0.5 mg/3 mL nebulizer solution Inhale 3 mLs into the lungs every 6 (six) hours as needed.    alendronate (FOSAMAX) 70 MG tablet Take 70 mg by mouth every 7 days.    ALPRAZolam (XANAX) 0.5 MG tablet Take 0.5 mg by mouth daily as needed.    CAPLYTA 42 mg Cap Take 1 capsule by mouth Daily.    famotidine (PEPCID) 40 MG tablet Take 40 mg by mouth.    ondansetron (ZOFRAN-ODT) 4 MG TbDL Take 4 mg by mouth every 6 (six) hours as needed.    pantoprazole (PROTONIX) 40 MG tablet Take 40 mg by mouth every evening.    rOPINIRole (REQUIP) 2 MG tablet Take 2 mg by mouth 2 (two) times daily.    SYMBICORT 160-4.5 mcg/actuation HFAA Inhale 2 puffs into the lungs every 12 (twelve) hours.    traZODone (DESYREL) 100 MG tablet Take 100 mg by mouth every evening.     Family History       Problem Relation (Age of Onset)    Cancer Maternal Grandmother    Diabetes Maternal Grandmother    Heart disease Paternal Grandmother, Maternal Grandmother    Hypertension Father, Mother          Tobacco Use    Smoking status: Never    Smokeless tobacco: Never   Substance and Sexual Activity    Alcohol use: Not Currently    Drug use: Never    Sexual activity: Not Currently     Review of Systems   All other systems reviewed and are negative.    Objective:      Vital Signs (Most Recent):  Temp: 98.7 °F (37.1 °C) (07/03/25 0630)  Pulse: 95 (07/03/25 0630)  Resp: 18 (07/03/25 0630)  BP: (!) 145/58 (07/03/25 0630)  SpO2: 100 % (07/03/25 0630) Vital Signs (24h Range):  Temp:  [98.7 °F (37.1 °C)] 98.7 °F (37.1 °C)  Pulse:  [95] 95  Resp:  [18] 18  SpO2:  [100 %] 100 %  BP: (145)/(58) 145/58     Weight: 42.7 kg (94 lb 2.2 oz)  Body mass index is 20.37 kg/m².     Physical Exam  Vitals and nursing note reviewed.   Constitutional:       General: She is not in acute distress.     Appearance: Normal appearance. She is ill-appearing.   HENT:      Head: Normocephalic and atraumatic.      Nose: Nose normal.      Mouth/Throat:      Pharynx: Oropharynx is clear.   Eyes:      Extraocular Movements: Extraocular movements intact.      Pupils: Pupils are equal, round, and reactive to light.   Cardiovascular:      Rate and Rhythm: Tachycardia present.      Pulses: Normal pulses.      Heart sounds: Normal heart sounds.   Pulmonary:      Effort: Pulmonary effort is normal. No respiratory distress.      Breath sounds: Normal breath sounds. No wheezing, rhonchi or rales.   Abdominal:      General: Abdomen is flat. Bowel sounds are normal. There is no distension.      Palpations: Abdomen is soft.      Tenderness: There is no abdominal tenderness. There is no guarding.      Comments: Drain in place   Skin:     Coloration: Skin is pale.   Neurological:      General: No focal deficit present.      Mental Status: She is alert and oriented to person, place, and time.   Psychiatric:         Mood and Affect: Mood normal.         Behavior: Behavior normal.              CRANIAL NERVES     CN III, IV, VI   Pupils are equal, round, and reactive to light.       Significant Labs: All pertinent labs within the past 24 hours have been reviewed.  Recent Lab Results       None            Significant Imaging: I have reviewed all pertinent imaging results/findings within the past 24 hours.    No orders to display

## 2025-07-03 NOTE — HPI
48 y/o female with PMHx of COPD, asthma, liver disease, cholecystectomy, hysterectomy, partial liver resection, underwent PTC with drain placement in May, who presents as a transfer from [place] with c/o fevers/chills, RUQ pain, and drainage worsening over past several weeks. Chart reviewed via paper records sent by transferring facility. Patient s/p PTC with drain placement, stent placed in bile duct 5/26; she noted discharge around tract site and completed course of antibiotics. Drainage returned past several days. Her indwelling biliary drain was shortened by her surgeon yesterday.     Patient met sepsis criteria on arrival; Temp 102.5, , RR 20, /70, 94% on room air. SBP noted to drop to 80-90's.  WBC 7.06k with 93.2 neutrophils shift. Lactic acid 2.2 --> 0.6 after IV fluids. CXR with no acute findings. UA negative. Covid/Flu negative. 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 Reve iced 2L NS (> 30cc/kg bolus), hypotension resolved, received Zosyn. Admit inpatient for further management.

## 2025-07-03 NOTE — PLAN OF CARE
[facility] - Telemetry (Hospital)  Initial Discharge Assessment       Primary Care Provider: Meeks, Claude H., MD    Admission Diagnosis: Septic shock [A41.9, R65.21]  Sepsis [A41.9]    Admission Date: 7/3/2025  Expected Discharge Date: 7/7/2025    Transition of Care Barriers: None    Payor: MEDICAID / Plan: HEALTH[employer] BLUE [place] / Product Type: Managed Medicaid /     Extended Emergency Contact Information  Primary Emergency Contact: ellyn kapoor  Mobile Phone: [phone]  Relation: Spouse    Discharge Plan A: Home         [facility] - [place], [place] - [address]  [address]  [place] [address]  Phone: [phone] Fax: [phone]    [facility] - [place], [place] - [address]  [address]   [place] [address]  Phone: [phone] Fax: [phone]      Initial Assessment (most recent)       Adult Discharge Assessment - 07/03/25 1551          Discharge Assessment    Assessment Type Discharge Planning Assessment     Confirmed/corrected address, phone number and insurance Yes     Confirmed Demographics Correct on Facesheet     Source of Information patient     Communicated ALLAN with patient/caregiver Date not available/Unable to determine     People in Home spouse     Name(s) of People in Home Spouse     Facility Arrived From: Home     Do you expect to return to your current living situation? Yes     Do you have help at home or someone to help you manage your care at home? Yes     Who are your caregiver(s) and their phone number(s)? SPouse     Prior to hospitilization cognitive status: Alert/Oriented     Current cognitive status: Alert/Oriented     Walking or Climbing Stairs Difficulty no     Dressing/Bathing Difficulty no     Equipment Currently Used at Home none     Readmission within 30 days? No     Patient currently being followed by outpatient case management? No     Do you currently have service(s) that help you manage your care at home? No     Do you take  prescription medications? Yes     Do you have prescription coverage? Yes     Do you have any problems affording any of your prescribed medications? No     Is the patient taking medications as prescribed? yes     Who is going to help you get home at discharge? Spouse     How do you get to doctors appointments? family or friend will provide;car, drives self     Are you on dialysis? No     Do you take coumadin? No     Discharge Plan A Home     DME Needed Upon Discharge  none     Discharge Plan discussed with: Spouse/sig other;Patient     Transition of Care Barriers None                   Anticipated DC dispo: Home  Prior Level of Function: Lives at home with spouse, independent with ADLs. Spouse will be transport at DC  PCP:      Comments: Pt's whiteboard updated with CM contact and anticipated discharge disposition. SW to remain available as needed.

## 2025-07-03 NOTE — PLAN OF CARE
A248/A248 YARITZAKj Gao is a 49 y.o.female admitted on 7/3/2025 for Sepsis   Code Status: Full Code MRN: [id number]   Review of patient's allergies indicates:   Allergen Reactions    Sumatriptan Nausea And Vomiting     Other reaction(s): vomiting     Past Medical History:   Diagnosis Date    Asthma     COPD (chronic obstructive pulmonary disease)     Liver disease     Mental disorder       PRN meds    albuterol-ipratropium, 3 mL, Q6H PRN  ALPRAZolam, 0.5 mg, BID PRN  dextrose 50%, 12.5 g, PRN  dextrose 50%, 25 g, PRN  glucagon (human recombinant), 1 mg, PRN  glucose, 16 g, PRN  glucose, 24 g, PRN  melatonin, 6 mg, Nightly PRN  morphine, 2 mg, Q3H PRN  naloxone, 0.02 mg, PRN  ondansetron, 4 mg, Q6H PRN  oxyCODONE, 5 mg, Q4H PRN  simethicone, 1 tablet, QID PRN  sodium chloride 0.9%, 10 mL, Q12H PRN      Chart check completed.      Orientation: oriented x 4  Cesilia Coma Scale Score: 15     Lead Monitored: Lead II Rhythm: normal sinus rhythm    Cardiac/Telemetry Box Number: [id number]    Last Bowel Movement: 07/02/25  Diet Clear Liquid Standard Tray     Bruce Score: 21  Fall Risk Score: 0       Lines/Drains/Airways       Drain  Duration                  Biliary Tube 06/30/25 0857  10 Fr. LUQ 3 days              Peripheral Intravenous Line  Duration             Peripheral IV 07/02/25 20 G Posterior;Right Hand 1 day                       Problem: Adult Inpatient Plan of Care  Goal: Plan of Care Review  Outcome: Progressing  Goal: Patient-Specific Goal (Individualized)  Outcome: Progressing  Goal: Absence of Hospital-Acquired Illness or Injury  Outcome: Progressing  Goal: Optimal Comfort and Wellbeing  Outcome: Progressing  Goal: Readiness for Transition of Care  Outcome: Progressing     Problem: Sepsis/Septic Shock  Goal: Optimal Coping  Outcome: Progressing  Goal: Absence of Bleeding  Outcome: Progressing  Goal: Blood Glucose Level Within Targeted Range  Outcome: Progressing  Goal: Absence of Infection Signs and  Symptoms  Outcome: Progressing  Goal: Optimal Nutrition Intake  Outcome: Progressing     Problem: Fall Injury Risk  Goal: Absence of Fall and Fall-Related Injury  Outcome: Progressing

## 2025-07-03 NOTE — NURSING TRANSFER
Nursing Transfer Note      7/3/2025     6:44 AM    Nurse giving handoff:Ramy JONES    Nurse receiving handoff:Jose    Reason patient is being transferred: Sepsis shock    Transfer From: ED IBV    Transfer via bed    Transfer with N/A    Transported by [facility]    Transfer Vital Signs: Stable    Order for Tele Monitor? No      Patient belongings transferred with patient: Yes    Chart send with patient: Yes    Notified: daughter    Patient reassessed at:07/03/25,0630    Upon arrival to floor: patient oriented to room, call bell in reach, and bed in lowest position

## 2025-07-03 NOTE — ASSESSMENT & PLAN NOTE
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   Patient reports plans for removal on 7/7/25  Outside facility imaging to be uploaded for review  Multimodal pain control requiring IV and PO narcotics  Consult IR

## 2025-07-03 NOTE — PROGRESS NOTES
Pharmacist Renal Dose Adjustment Note    Tiffanie Gao is a 49 y.o. female being treated with the medication Pepcid    Patient Data:    Vital Signs (Most Recent):  Temp: 99.2 °F (37.3 °C) (07/03/25 0750)  Pulse: 104 (07/03/25 0750)  Resp: 19 (07/03/25 0750)  BP: (!) 102/57 (07/03/25 0750)  SpO2: 99 % (07/03/25 0750) Vital Signs (72h Range):  Temp:  [98.7 °F (37.1 °C)-99.2 °F (37.3 °C)]   Pulse:  []   Resp:  [18-19]   BP: (102-145)/(57-58)   SpO2:  [99 %-100 %]      Recent Labs   Lab 07/03/25  0743   CREATININE 0.7     Serum creatinine: 0.7 mg/dL 07/03/25 0743  Estimated creatinine clearance: 64.1 mL/min    Medication:Pepcid dose: 40mg frequency QD will be changed to medication:Pepcid dose:20mg frequency:BID    Pharmacist's Name: Brittany Freeman  Pharmacist's Extension: [phone]

## 2025-07-03 NOTE — ASSESSMENT & PLAN NOTE
"Patient has sepsis without organ dysfunction secondary to Intra-abdominal Infection. A review of systems was completed. Patient's sepsis is worsening. Will continue current treatment    Current Antibiotics    piperacillin-tazobactam (ZOSYN) 4.5 g in D5W 100 mL IVPB (MB+), Every 8 hours (non-standard times), Intravenous    Lactate  No results for input(s): "LACTATE", "POCLAC" in the last 4320 hours.  Culture Data  Blood Cultures   Blood Culture   Date Value Ref Range Status   05/30/2025 No Growth After 5 Days  Final   05/30/2025 No Growth After 5 Days  Final      mSOFA  MSOFA Total  Min: 0   Min taken time: 07/03/25 0800  Max: 0   Max taken time: 07/03/25 0800    Plan  - Antibiotics as listed above  - Fluid resuscitation as follows:Actual Body Weight- The patient's actual body weight will be used to calculate the 30 ml/kg fluid bolus.   - Trend lactate to resolution  - Follow up culture data  - Vasopressors were not needed  - The following services were consulted:Hospital Medicine.  - Continue Zosyn, blood cultures ordered    "

## 2025-07-03 NOTE — PROVIDER TRANSFER
Outside Transfer Acceptance Note / Regional Referral Center    Referring facility: [facility]   Referring provider: LLUVIA THOMPSON  Accepting facility: [facility]  Accepting provider: TARUN HANSON  Admitting provider: Dr. Tarun Hanson  Reason for transfer: Higher Level of Care   Transfer diagnosis: septic shock  Transfer specialty requested: Interventional Radiology  Transfer specialty notified: No  Transfer level: NUMBER 1-5: 2  Bed type requested: telemetry  Isolation status: No active isolations   Admission class or status: Emergency      Narrative     49 year old female with PMHx of COPD, asthma, liver disease, cholecystectomy, hysterectomy, partial liver resection, underwent PTC with drain placement in May who presented to the ED with fever and RUQ abdominal pain.    Her indwelling biliary drain was shortened by her surgeon yesterday. A few weeks ago she was having a discharge from around the tract and was placed on antibiotics at that time. She completed the course of abx with resolution of drainage. CT A/P today shows pigtail catheter located in the hepatic hilium small amount of right upper quadrant/perihepatic fluid. Dr. Banks is aware of the transfer request, however there are not any beds at [facility] to accommodate this patient. The hepatologist is ok with the patient being transferred to another facility for IR services.    Upon arrival to the ED, patient initially septic with fever 102, 's, and SBP in 80's-90's. Given IVF, IV zosyn. CXR negative, UA negative. COVID/flu negative. Patient now much improved: /69 HR 96 temp 98.6 on room air. LA 2.2, now 0.6.        Objective     Vitals:    Recent Labs: All pertinent labs within the past 24 hours have been reviewed.  Recent imaging: as above   Airway:     Vent settings:         IV access:    Infusions: None  Allergies:   Review of patient's allergies indicates:   Allergen Reactions     Sumatriptan Nausea And Vomiting     Other reaction(s): vomiting      NPO: No    Anticoagulation:   Anticoagulants       None             Instructions      [facility]  Admit to Hospital Medicine  Upon patient arrival to floor, please contact Hospital Medicine on call.     Consult IR

## 2025-07-04 PROBLEM — A49.8 INFECTION DUE TO ESBL-PRODUCING ESCHERICHIA COLI: Status: ACTIVE | Noted: 2025-07-04

## 2025-07-04 PROBLEM — R78.81 BACTEREMIA DUE TO ESCHERICHIA COLI: Status: ACTIVE | Noted: 2025-07-04

## 2025-07-04 PROBLEM — Z16.12 INFECTION DUE TO ESBL-PRODUCING ESCHERICHIA COLI: Status: ACTIVE | Noted: 2025-07-04

## 2025-07-04 PROBLEM — B96.20 BACTEREMIA DUE TO ESCHERICHIA COLI: Status: ACTIVE | Noted: 2025-07-04

## 2025-07-04 LAB
ABSOLUTE EOSINOPHIL (OHS): 0.07 K/UL
ABSOLUTE MONOCYTE (OHS): 0.72 K/UL (ref 0.3–1)
ABSOLUTE NEUTROPHIL COUNT (OHS): 6.07 K/UL (ref 1.8–7.7)
ACB COMPLEX DNA BLD POS QL NAA+NON-PROBE: NOT DETECTED
ACB COMPLEX DNA BLD POS QL NAA+NON-PROBE: NOT DETECTED
ALBUMIN SERPL BCP-MCNC: 2.1 G/DL (ref 3.5–5.2)
ALP SERPL-CCNC: 293 UNIT/L (ref 40–150)
ALT SERPL W/O P-5'-P-CCNC: 24 UNIT/L (ref 10–44)
ANION GAP (OHS): 3 MMOL/L (ref 8–16)
AST SERPL-CCNC: 21 UNIT/L (ref 11–45)
B FRAGILIS DNA BLD POS QL NAA+PROBE: NOT DETECTED
B FRAGILIS DNA BLD POS QL NAA+PROBE: NOT DETECTED
BASOPHILS # BLD AUTO: 0.02 K/UL
BASOPHILS NFR BLD AUTO: 0.3 %
BILIRUB SERPL-MCNC: 1.1 MG/DL (ref 0.1–1)
BUN SERPL-MCNC: 7 MG/DL (ref 6–20)
C ALBICANS DNA BLD POS QL NAA+PROBE: NOT DETECTED
C ALBICANS DNA BLD POS QL NAA+PROBE: NOT DETECTED
C AURIS DNA BLD POS QL NAA+NON-PROBE: NOT DETECTED
C AURIS DNA BLD POS QL NAA+NON-PROBE: NOT DETECTED
C GATTII+NEOFOR DNA CSF QL NAA+NON-PROBE: NOT DETECTED
C GATTII+NEOFOR DNA CSF QL NAA+NON-PROBE: NOT DETECTED
C GLABRATA DNA BLD POS QL NAA+PROBE: NOT DETECTED
C GLABRATA DNA BLD POS QL NAA+PROBE: NOT DETECTED
C KRUSEI DNA BLD POS QL NAA+PROBE: NOT DETECTED
C KRUSEI DNA BLD POS QL NAA+PROBE: NOT DETECTED
C PARAP DNA BLD POS QL NAA+PROBE: NOT DETECTED
C PARAP DNA BLD POS QL NAA+PROBE: NOT DETECTED
C TROPICLS DNA BLD POS QL NAA+PROBE: NOT DETECTED
C TROPICLS DNA BLD POS QL NAA+PROBE: NOT DETECTED
CALCIUM SERPL-MCNC: 7.9 MG/DL (ref 8.7–10.5)
CHLORIDE SERPL-SCNC: 109 MMOL/L (ref 95–110)
CO2 SERPL-SCNC: 26 MMOL/L (ref 23–29)
COLISTIN RES MCR-1 ISLT/SPM QL: NOT DETECTED
COLISTIN RES MCR-1 ISLT/SPM QL: NOT DETECTED
CREAT SERPL-MCNC: 0.6 MG/DL (ref 0.5–1.4)
E CLOAC COMP DNA BLD POS QL NAA+PROBE: NOT DETECTED
E CLOAC COMP DNA BLD POS QL NAA+PROBE: NOT DETECTED
E COLI DNA BLD POS QL NAA+PROBE: DETECTED
E COLI DNA BLD POS QL NAA+PROBE: DETECTED
E FAECALIS+OTHR E SP RRNA BLD POS FISH: NOT DETECTED
E FAECALIS+OTHR E SP RRNA BLD POS FISH: NOT DETECTED
E FAECIUM HSP60 BLD POS QL PROBE: NOT DETECTED
E FAECIUM HSP60 BLD POS QL PROBE: NOT DETECTED
ENTEROBACTERALES DNA BLD POS NAA+N-PRB: ABNORMAL
ENTEROBACTERALES DNA BLD POS NAA+N-PRB: ABNORMAL
ERYTHROCYTE [DISTWIDTH] IN BLOOD BY AUTOMATED COUNT: 13.9 % (ref 11.5–14.5)
ESBL CFT TO CFT-CLAV IC RTO BD POS IMP: DETECTED
ESBL CFT TO CFT-CLAV IC RTO BD POS IMP: DETECTED
GFR SERPLBLD CREATININE-BSD FMLA CKD-EPI: >60 ML/MIN/1.73/M2
GLUCOSE SERPL-MCNC: 95 MG/DL (ref 70–110)
GP B STREP DNA CSF QL NAA+NON-PROBE: NOT DETECTED
GP B STREP DNA CSF QL NAA+NON-PROBE: NOT DETECTED
HAEM INFLU DNA CSF QL NAA+NON-PROBE: NOT DETECTED
HAEM INFLU DNA CSF QL NAA+NON-PROBE: NOT DETECTED
HCT VFR BLD AUTO: 31.1 % (ref 37–48.5)
HGB BLD-MCNC: 10.2 GM/DL (ref 12–16)
HOLD SPECIMEN: NORMAL
IMM GRANULOCYTES # BLD AUTO: 0.06 K/UL (ref 0–0.04)
IMM GRANULOCYTES NFR BLD AUTO: 0.8 % (ref 0–0.5)
IMP CARBAPENEMASE ISLT QL IA.RAPID: NOT DETECTED
IMP CARBAPENEMASE ISLT QL IA.RAPID: NOT DETECTED
K OXYTOCA OMPA BLD POS QL PROBE: NOT DETECTED
K OXYTOCA OMPA BLD POS QL PROBE: NOT DETECTED
KLEBSIELLA SP DNA BLD POS QL NAA+NON-PRB: NOT DETECTED
KPC CARBAPENEMASE ISLT QL IA.RAPID: NOT DETECTED
KPC CARBAPENEMASE ISLT QL IA.RAPID: NOT DETECTED
L MONOCYTOG DNA CSF QL NAA+NON-PROBE: NOT DETECTED
L MONOCYTOG DNA CSF QL NAA+NON-PROBE: NOT DETECTED
LYMPHOCYTES # BLD AUTO: 0.68 K/UL (ref 1–4.8)
MCH RBC QN AUTO: 29.3 PG (ref 27–31)
MCHC RBC AUTO-ENTMCNC: 32.8 G/DL (ref 32–36)
MCV RBC AUTO: 89 FL (ref 82–98)
MECA+MECC NOSE QL NAA+PROBE: ABNORMAL
MECA+MECC NOSE QL NAA+PROBE: ABNORMAL
MECA+MECC+MREJ ISLT/SPM QL: ABNORMAL
MECA+MECC+MREJ ISLT/SPM QL: ABNORMAL
N MEN DNA CSF QL NAA+NON-PROBE: NOT DETECTED
N MEN DNA CSF QL NAA+NON-PROBE: NOT DETECTED
NDM CARBAPENEMASE ISLT QL IA.RAPID: NOT DETECTED
NDM CARBAPENEMASE ISLT QL IA.RAPID: NOT DETECTED
NUCLEATED RBC (/100WBC) (OHS): 0 /100 WBC
OXA-48-LIKE CRBPNASE ISLT QL IA.RAPID: NOT DETECTED
OXA-48-LIKE CRBPNASE ISLT QL IA.RAPID: NOT DETECTED
P AERUGINOSA DNA BLD POS QL NAA+PROBE: NOT DETECTED
P AERUGINOSA DNA BLD POS QL NAA+PROBE: NOT DETECTED
PLATELET # BLD AUTO: 176 K/UL (ref 150–450)
PMV BLD AUTO: 10.3 FL (ref 9.2–12.9)
POTASSIUM SERPL-SCNC: 3.7 MMOL/L (ref 3.5–5.1)
PROT SERPL-MCNC: 4.7 GM/DL (ref 6–8.4)
PROTEUS SP DNA BLD POS QL NAA+PROBE: NOT DETECTED
PROTEUS SP DNA BLD POS QL NAA+PROBE: NOT DETECTED
RBC # BLD AUTO: 3.48 M/UL (ref 4–5.4)
RELATIVE EOSINOPHIL (OHS): 0.9 %
RELATIVE LYMPHOCYTE (OHS): 8.9 % (ref 18–48)
RELATIVE MONOCYTE (OHS): 9.4 % (ref 4–15)
RELATIVE NEUTROPHIL (OHS): 79.7 % (ref 38–73)
S AUREUS DNA BLD POS QL NAA+PROBE: NOT DETECTED
S AUREUS DNA BLD POS QL NAA+PROBE: NOT DETECTED
S ENT+BONG DNA STL QL NAA+NON-PROBE: NOT DETECTED
S ENT+BONG DNA STL QL NAA+NON-PROBE: NOT DETECTED
S EPIDERMIDIS HSP60 BLD POS QL PROBE: NOT DETECTED
S EPIDERMIDIS HSP60 BLD POS QL PROBE: NOT DETECTED
S LUGDUNENSIS SODA BLD POS QL PROBE: NOT DETECTED
S LUGDUNENSIS SODA BLD POS QL PROBE: NOT DETECTED
S MALTOPH DNA BLD POS QL NAA+PROBE: NOT DETECTED
S MALTOPH DNA BLD POS QL NAA+PROBE: NOT DETECTED
S MARCESCENS DNA BLD POS QL NAA+PROBE: NOT DETECTED
S MARCESCENS DNA BLD POS QL NAA+PROBE: NOT DETECTED
S PNEUM DNA CSF QL NAA+NON-PROBE: NOT DETECTED
S PNEUM DNA CSF QL NAA+NON-PROBE: NOT DETECTED
S PYOGENES HSP60 BLD POS QL PROBE: NOT DETECTED
S PYOGENES HSP60 BLD POS QL PROBE: NOT DETECTED
SODIUM SERPL-SCNC: 138 MMOL/L (ref 136–145)
STAPH SP TUF BLD POS QL PROBE: NOT DETECTED
STAPH SP TUF BLD POS QL PROBE: NOT DETECTED
STREPTOCOCCUS SP TUF BLD POS QL PROBE: NOT DETECTED
STREPTOCOCCUS SP TUF BLD POS QL PROBE: NOT DETECTED
VAN(A+B+C1+C2) GENES ISLT/SPM: ABNORMAL
VAN(A+B+C1+C2) GENES ISLT/SPM: ABNORMAL
VIM CARBAPENEMASE ISLT QL IA.RAPID: NOT DETECTED
VIM CARBAPENEMASE ISLT QL IA.RAPID: NOT DETECTED
WBC # BLD AUTO: 7.62 K/UL (ref 3.9–12.7)

## 2025-07-04 PROCEDURE — 63600175 PHARM REV CODE 636 W HCPCS: Performed by: HOSPITALIST

## 2025-07-04 PROCEDURE — 94640 AIRWAY INHALATION TREATMENT: CPT

## 2025-07-04 PROCEDURE — 21400001 HC TELEMETRY ROOM

## 2025-07-04 PROCEDURE — 25000003 PHARM REV CODE 250: Performed by: HOSPITALIST

## 2025-07-04 PROCEDURE — 25000242 PHARM REV CODE 250 ALT 637 W/ HCPCS: Performed by: HOSPITALIST

## 2025-07-04 PROCEDURE — 25000003 PHARM REV CODE 250: Performed by: STUDENT IN AN ORGANIZED HEALTH CARE EDUCATION/TRAINING PROGRAM

## 2025-07-04 PROCEDURE — 80053 COMPREHEN METABOLIC PANEL: CPT | Performed by: HOSPITALIST

## 2025-07-04 PROCEDURE — 36415 COLL VENOUS BLD VENIPUNCTURE: CPT | Performed by: HOSPITALIST

## 2025-07-04 PROCEDURE — 63600175 PHARM REV CODE 636 W HCPCS: Performed by: STUDENT IN AN ORGANIZED HEALTH CARE EDUCATION/TRAINING PROGRAM

## 2025-07-04 PROCEDURE — 85025 COMPLETE CBC W/AUTO DIFF WBC: CPT | Performed by: HOSPITALIST

## 2025-07-04 RX ORDER — PROCHLORPERAZINE EDISYLATE 5 MG/ML
5 INJECTION INTRAMUSCULAR; INTRAVENOUS EVERY 6 HOURS PRN
Status: DISCONTINUED | OUTPATIENT
Start: 2025-07-04 | End: 2025-07-08 | Stop reason: HOSPADM

## 2025-07-04 RX ADMIN — OXYCODONE HYDROCHLORIDE 5 MG: 5 TABLET ORAL at 09:07

## 2025-07-04 RX ADMIN — ERTAPENEM 1 G: 1 INJECTION INTRAMUSCULAR; INTRAVENOUS at 06:07

## 2025-07-04 RX ADMIN — ROPINIROLE HYDROCHLORIDE 2 MG: 1 TABLET, FILM COATED ORAL at 08:07

## 2025-07-04 RX ADMIN — BUDESONIDE INHALATION 0.5 MG: 0.5 SUSPENSION RESPIRATORY (INHALATION) at 08:07

## 2025-07-04 RX ADMIN — ALUMINA, MAGNESIA, AND SIMETHICONE ORAL SUSPENSION REGULAR STRENGTH 30 ML: 1200; 1200; 120 SUSPENSION ORAL at 09:07

## 2025-07-04 RX ADMIN — PIPERACILLIN SODIUM AND TAZOBACTAM SODIUM 4.5 G: 4; .5 INJECTION, POWDER, FOR SOLUTION INTRAVENOUS at 12:07

## 2025-07-04 RX ADMIN — ALUMINA, MAGNESIA, AND SIMETHICONE ORAL SUSPENSION REGULAR STRENGTH 30 ML: 1200; 1200; 120 SUSPENSION ORAL at 04:07

## 2025-07-04 RX ADMIN — FAMOTIDINE 20 MG: 20 TABLET, FILM COATED ORAL at 08:07

## 2025-07-04 RX ADMIN — ALUMINA, MAGNESIA, AND SIMETHICONE ORAL SUSPENSION REGULAR STRENGTH 30 ML: 1200; 1200; 120 SUSPENSION ORAL at 06:07

## 2025-07-04 RX ADMIN — SUCRALFATE 1 G: 1 SUSPENSION ORAL at 06:07

## 2025-07-04 RX ADMIN — ROPINIROLE HYDROCHLORIDE 2 MG: 1 TABLET, FILM COATED ORAL at 09:07

## 2025-07-04 RX ADMIN — MORPHINE SULFATE 2 MG: 4 INJECTION INTRAVENOUS at 09:07

## 2025-07-04 RX ADMIN — PROCHLORPERAZINE EDISYLATE 5 MG: 5 INJECTION INTRAMUSCULAR; INTRAVENOUS at 12:07

## 2025-07-04 RX ADMIN — FAMOTIDINE 20 MG: 20 TABLET, FILM COATED ORAL at 09:07

## 2025-07-04 RX ADMIN — BUDESONIDE INHALATION 0.5 MG: 0.5 SUSPENSION RESPIRATORY (INHALATION) at 07:07

## 2025-07-04 RX ADMIN — PANTOPRAZOLE SODIUM 40 MG: 40 TABLET, DELAYED RELEASE ORAL at 08:07

## 2025-07-04 RX ADMIN — ARFORMOTEROL TARTRATE 15 MCG: 15 SOLUTION RESPIRATORY (INHALATION) at 08:07

## 2025-07-04 RX ADMIN — ONDANSETRON 4 MG: 2 INJECTION INTRAMUSCULAR; INTRAVENOUS at 09:07

## 2025-07-04 RX ADMIN — SUCRALFATE 1 G: 1 SUSPENSION ORAL at 12:07

## 2025-07-04 RX ADMIN — SUCRALFATE 1 G: 1 SUSPENSION ORAL at 11:07

## 2025-07-04 RX ADMIN — SUCRALFATE 1 G: 1 SUSPENSION ORAL at 05:07

## 2025-07-04 RX ADMIN — TRAZODONE HYDROCHLORIDE 100 MG: 100 TABLET ORAL at 08:07

## 2025-07-04 RX ADMIN — OXYCODONE HYDROCHLORIDE 5 MG: 5 TABLET ORAL at 06:07

## 2025-07-04 RX ADMIN — PIPERACILLIN SODIUM AND TAZOBACTAM SODIUM 4.5 G: 4; .5 INJECTION, POWDER, FOR SOLUTION INTRAVENOUS at 09:07

## 2025-07-04 RX ADMIN — SODIUM CHLORIDE: 9 INJECTION, SOLUTION INTRAVENOUS at 12:07

## 2025-07-04 RX ADMIN — ARFORMOTEROL TARTRATE 15 MCG: 15 SOLUTION RESPIRATORY (INHALATION) at 07:07

## 2025-07-04 RX ADMIN — ALUMINA, MAGNESIA, AND SIMETHICONE ORAL SUSPENSION REGULAR STRENGTH 30 ML: 1200; 1200; 120 SUSPENSION ORAL at 08:07

## 2025-07-04 NOTE — PROGRESS NOTES
[facility] Medicine  Progress Note    Patient Name: Tiffanie Gao  MRN: [id number]  Patient Class: IP- Inpatient   Admission Date: 7/3/2025  Length of Stay: 1 days  Attending Physician: Kwabena Love MD  Primary Care Provider: Meeks, Claude H., MD        Subjective     Principal Problem:Sepsis        HPI:  48 y/o female with PMHx of COPD, asthma, liver disease, cholecystectomy, hysterectomy, partial liver resection, underwent PTC with drain placement in May, who presents as a transfer from [place] with c/o fevers/chills, RUQ pain, and drainage worsening over past several weeks. Chart reviewed via paper records sent by transferring facility. Patient s/p PTC with drain placement, stent placed in bile duct 5/26; she noted discharge around tract site and completed course of antibiotics. Drainage returned past several days. Her indwelling biliary drain was shortened by her surgeon yesterday.     Patient met sepsis criteria on arrival; Temp 102.5, , RR 20, /70, 94% on room air. SBP noted to drop to 80-90's.  WBC 7.06k with 93.2 neutrophils shift. Lactic acid 2.2 --> 0.6 after IV fluids. CXR with no acute findings. UA negative. Covid/Flu negative. 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 Reve iced 2L NS (> 30cc/kg bolus), hypotension resolved, received Zosyn. Admit inpatient for further management.      Overview/Hospital Course:  No notes on file    Interval History: patient stating she feels better however still with mild abdominal soreness.  Found to have ESBL E coli bacteremia.  Denies any fevers, chills, nausea, vomiting, diarrhea.    Review of Systems   All other systems reviewed and are negative.    Objective:     Vital Signs (Most Recent):  Temp: 98.7 °F (37.1 °C) (07/04/25 1528)  Pulse: (!) 116 (ambulating) (07/04/25 1540)  Resp: 18 (07/04/25 1528)  BP: 105/62 (07/04/25 1528)  SpO2: 99 % (07/04/25 1528)  Vital Signs (24h Range):  Temp:  [98.3 °F (36.8 °C)-98.9 °F (37.2 °C)] 98.7 °F (37.1 °C)  Pulse:  [] 116  Resp:  [16-20] 18  SpO2:  [94 %-100 %] 99 %  BP: ()/(52-75) 105/62     Weight: 45.1 kg (99 lb 6.8 oz)  Body mass index is 21.52 kg/m².    Intake/Output Summary (Last 24 hours) at 7/4/2025 1626  Last data filed at 7/4/2025 1203  Gross per 24 hour   Intake 4489.24 ml   Output 80 ml   Net 4409.24 ml         Physical Exam  Constitutional:       General: She is not in acute distress.     Comments: Thin elderly woman, comfortable   Eyes:      Extraocular Movements: Extraocular movements intact.      Pupils: Pupils are equal, round, and reactive to light.   Cardiovascular:      Rate and Rhythm: Normal rate.      Heart sounds: No murmur heard.  Pulmonary:      Effort: Pulmonary effort is normal. No respiratory distress.      Breath sounds: No wheezing.   Abdominal:      General: There is no distension.      Tenderness: There is no abdominal tenderness.      Comments: Percutaneous drain in place still draining.   Musculoskeletal:         General: No swelling or tenderness.   Skin:     General: Skin is warm and dry.   Neurological:      General: No focal deficit present.      Mental Status: She is alert and oriented to person, place, and time.      Cranial Nerves: No cranial nerve deficit.   Psychiatric:         Mood and Affect: Mood normal.         Behavior: Behavior normal.               Significant Labs: All pertinent labs within the past 24 hours have been reviewed.  CBC:   Recent Labs   Lab 07/03/25  0743 07/04/25  0502   WBC 12.02 7.62   HGB 11.8* 10.2*   HCT 35.6* 31.1*    176     CMP:   Recent Labs   Lab 07/03/25  0743 07/04/25  0502    138   K 4.0 3.7    109   CO2 23 26   GLU 86 95   BUN 11 7   CREATININE 0.7 0.6   CALCIUM 8.1* 7.9*   PROT 5.9* 4.7*   ALBUMIN 2.8* 2.1*   BILITOT 1.4* 1.1*   ALKPHOS 379* 293*   AST 36 21   ALT 31 24   ANIONGAP 8 3*       Significant Imaging: I have  reviewed all pertinent imaging results/findings within the past 24 hours.      Assessment & Plan  Sepsis  Bacteremia due to Escherichia coli  Infection due to ESBL-producing Escherichia coli  -started on ertapenem.  Likely will need 14 days IV antibiotics.  We will need to consult ID for further input.  -plan for repeat cultures in 48 hours.  -tolerating oral intake.    Patient has sepsis without organ dysfunction secondary to Intra-abdominal Infection. A review of systems was completed. Patient's sepsis is worsening. Will continue current treatment    Current Antibiotics    ertapenem (INVANZ) 1 g in 0.9% NaCl 100 mL IVPB (MB+), Every 24 hours (non-standard times), Intravenous    Lactate  Recent Labs   Lab 07/03/25  0743   LACTATE 1.0     Culture Data  Blood Cultures   Blood Culture   Date Value Ref Range Status   05/30/2025 No Growth After 5 Days  Final   05/30/2025 No Growth After 5 Days  Final      mSOFA  MSOFA Total  Min: 0   Min taken time: 07/04/25 1601  Max: 1   Max taken time: 07/04/25 0600      Biliary drain displacement  -CT A/P revealed 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   -Patient reports plans for removal on 7/7/25.  Likely will need to be rescheduled.  -Multimodal pain control requiring IV and PO narcotics  -IR performed cholangiogram with findings of a focal stenosis however contrast was flowing easily through that region.  See imaging findings for complete details.    COPD (chronic obstructive pulmonary disease)  Patient's COPD is controlled currently.  Patient is currently off COPD Pathway. Continue scheduled inhalers Antibiotics and monitor respiratory status closely.   GERD (gastroesophageal reflux disease)  Resume home medications  On famotidine and protonix    Anxiety  Chronic, stable  Resume home medications  On Xanax, [place]-PDMP reviewed    VTE Risk Mitigation (From admission, onward)           Ordered     IP VTE HIGH RISK PATIENT  Once          07/03/25 0720     Place sequential compression device  Until discontinued         07/03/25 0720                    Discharge Planning   ALLAN: 7/7/2025     Code Status: Full Code   Medical Readiness for Discharge Date:   Discharge Plan A: Home                        Kwabena Love MD  Department of Hospital Medicine   [facility] - Telemetry ([facility])

## 2025-07-04 NOTE — ASSESSMENT & PLAN NOTE
-started on ertapenem.  Likely will need 14 days IV antibiotics.  We will need to consult ID for further input.  -plan for repeat cultures in 48 hours.  -tolerating oral intake.    Patient has sepsis without organ dysfunction secondary to Intra-abdominal Infection. A review of systems was completed. Patient's sepsis is worsening. Will continue current treatment    Current Antibiotics    ertapenem (INVANZ) 1 g in 0.9% NaCl 100 mL IVPB (MB+), Every 24 hours (non-standard times), Intravenous    Lactate  Recent Labs   Lab 07/03/25  0743   LACTATE 1.0     Culture Data  Blood Cultures   Blood Culture   Date Value Ref Range Status   05/30/2025 No Growth After 5 Days  Final   05/30/2025 No Growth After 5 Days  Final      mSOFA  MSOFA Total  Min: 0   Min taken time: 07/04/25 1601  Max: 1   Max taken time: 07/04/25 0600

## 2025-07-04 NOTE — PLAN OF CARE
A248/A248 YARITZAKj Gao is a 49 y.o.female admitted on 7/3/2025 for Sepsis   Code Status: Full Code MRN: [id number]   Review of patient's allergies indicates:   Allergen Reactions    Sumatriptan Nausea And Vomiting     Other reaction(s): vomiting     Past Medical History:   Diagnosis Date    Asthma     COPD (chronic obstructive pulmonary disease)     Liver disease     Mental disorder       PRN meds    albuterol-ipratropium, 3 mL, Q6H PRN  ALPRAZolam, 0.5 mg, BID PRN  dextrose 50%, 12.5 g, PRN  dextrose 50%, 25 g, PRN  glucagon (human recombinant), 1 mg, PRN  glucose, 16 g, PRN  glucose, 24 g, PRN  melatonin, 6 mg, Nightly PRN  morphine, 2 mg, Q3H PRN  naloxone, 0.02 mg, PRN  ondansetron, 4 mg, Q6H PRN  oxyCODONE, 5 mg, Q4H PRN  simethicone, 1 tablet, QID PRN  sodium chloride 0.9%, 10 mL, Q12H PRN      Chart check completed. Will continue plan of care.      Orientation: oriented x 4  Cesilia Coma Scale Score: 15     Lead Monitored: Lead II Rhythm: normal sinus rhythm    Cardiac/Telemetry Box Number: [id number]    Last Bowel Movement: 07/02/25  Diet Clear Liquid Standard Tray     Bruce Score: 21  Fall Risk Score: 5  Accucheck []   Freq?      Lines/Drains/Airways       Drain  Duration                  Biliary Tube 06/30/25 0857  10 Fr. LUQ 3 days              Peripheral Intravenous Line  Duration             Peripheral IV 07/02/25 20 G Posterior;Right Hand 2 days

## 2025-07-04 NOTE — ASSESSMENT & PLAN NOTE
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   -Patient reports plans for removal on 7/7/25.  Likely will need to be rescheduled.  -Multimodal pain control requiring IV and PO narcotics  -IR performed cholangiogram with findings of a focal stenosis however contrast was flowing easily through that region.  See imaging findings for complete details.

## 2025-07-04 NOTE — SUBJECTIVE & OBJECTIVE
Interval History: patient stating she feels better however still with mild abdominal soreness.  Found to have ESBL E coli bacteremia.  Denies any fevers, chills, nausea, vomiting, diarrhea.    Review of Systems   All other systems reviewed and are negative.    Objective:     Vital Signs (Most Recent):  Temp: 98.7 °F (37.1 °C) (07/04/25 1528)  Pulse: (!) 116 (ambulating) (07/04/25 1540)  Resp: 18 (07/04/25 1528)  BP: 105/62 (07/04/25 1528)  SpO2: 99 % (07/04/25 1528) Vital Signs (24h Range):  Temp:  [98.3 °F (36.8 °C)-98.9 °F (37.2 °C)] 98.7 °F (37.1 °C)  Pulse:  [] 116  Resp:  [16-20] 18  SpO2:  [94 %-100 %] 99 %  BP: ()/(52-75) 105/62     Weight: 45.1 kg (99 lb 6.8 oz)  Body mass index is 21.52 kg/m².    Intake/Output Summary (Last 24 hours) at 7/4/2025 1626  Last data filed at 7/4/2025 1203  Gross per 24 hour   Intake 4489.24 ml   Output 80 ml   Net 4409.24 ml         Physical Exam  Constitutional:       General: She is not in acute distress.     Comments: Thin elderly woman, comfortable   Eyes:      Extraocular Movements: Extraocular movements intact.      Pupils: Pupils are equal, round, and reactive to light.   Cardiovascular:      Rate and Rhythm: Normal rate.      Heart sounds: No murmur heard.  Pulmonary:      Effort: Pulmonary effort is normal. No respiratory distress.      Breath sounds: No wheezing.   Abdominal:      General: There is no distension.      Tenderness: There is no abdominal tenderness.      Comments: Percutaneous drain in place still draining.   Musculoskeletal:         General: No swelling or tenderness.   Skin:     General: Skin is warm and dry.   Neurological:      General: No focal deficit present.      Mental Status: She is alert and oriented to person, place, and time.      Cranial Nerves: No cranial nerve deficit.   Psychiatric:         Mood and Affect: Mood normal.         Behavior: Behavior normal.               Significant Labs: All pertinent labs within the past 24  hours have been reviewed.  CBC:   Recent Labs   Lab 07/03/25  0743 07/04/25  0502   WBC 12.02 7.62   HGB 11.8* 10.2*   HCT 35.6* 31.1*    176     CMP:   Recent Labs   Lab 07/03/25  0743 07/04/25  0502    138   K 4.0 3.7    109   CO2 23 26   GLU 86 95   BUN 11 7   CREATININE 0.7 0.6   CALCIUM 8.1* 7.9*   PROT 5.9* 4.7*   ALBUMIN 2.8* 2.1*   BILITOT 1.4* 1.1*   ALKPHOS 379* 293*   AST 36 21   ALT 31 24   ANIONGAP 8 3*       Significant Imaging: I have reviewed all pertinent imaging results/findings within the past 24 hours.

## 2025-07-05 LAB
ABSOLUTE EOSINOPHIL (OHS): 0.05 K/UL
ABSOLUTE MONOCYTE (OHS): 0.51 K/UL (ref 0.3–1)
ABSOLUTE NEUTROPHIL COUNT (OHS): 4.75 K/UL (ref 1.8–7.7)
ALBUMIN SERPL BCP-MCNC: 2.2 G/DL (ref 3.5–5.2)
ALP SERPL-CCNC: 270 UNIT/L (ref 40–150)
ALT SERPL W/O P-5'-P-CCNC: 22 UNIT/L (ref 10–44)
ANION GAP (OHS): 4 MMOL/L (ref 8–16)
AST SERPL-CCNC: 12 UNIT/L (ref 11–45)
BASOPHILS # BLD AUTO: 0.01 K/UL
BASOPHILS NFR BLD AUTO: 0.2 %
BILIRUB SERPL-MCNC: 0.7 MG/DL (ref 0.1–1)
BUN SERPL-MCNC: 5 MG/DL (ref 6–20)
CALCIUM SERPL-MCNC: 8.3 MG/DL (ref 8.7–10.5)
CHLORIDE SERPL-SCNC: 107 MMOL/L (ref 95–110)
CO2 SERPL-SCNC: 29 MMOL/L (ref 23–29)
CREAT SERPL-MCNC: 0.6 MG/DL (ref 0.5–1.4)
ERYTHROCYTE [DISTWIDTH] IN BLOOD BY AUTOMATED COUNT: 13.6 % (ref 11.5–14.5)
GFR SERPLBLD CREATININE-BSD FMLA CKD-EPI: >60 ML/MIN/1.73/M2
GLUCOSE SERPL-MCNC: 91 MG/DL (ref 70–110)
HCT VFR BLD AUTO: 30.7 % (ref 37–48.5)
HGB BLD-MCNC: 10.3 GM/DL (ref 12–16)
IMM GRANULOCYTES # BLD AUTO: 0.02 K/UL (ref 0–0.04)
IMM GRANULOCYTES NFR BLD AUTO: 0.3 % (ref 0–0.5)
LYMPHOCYTES # BLD AUTO: 0.9 K/UL (ref 1–4.8)
MCH RBC QN AUTO: 29.1 PG (ref 27–31)
MCHC RBC AUTO-ENTMCNC: 33.6 G/DL (ref 32–36)
MCV RBC AUTO: 87 FL (ref 82–98)
NUCLEATED RBC (/100WBC) (OHS): 0 /100 WBC
PLATELET # BLD AUTO: 202 K/UL (ref 150–450)
PMV BLD AUTO: 10.4 FL (ref 9.2–12.9)
POTASSIUM SERPL-SCNC: 3.6 MMOL/L (ref 3.5–5.1)
PROT SERPL-MCNC: 5.3 GM/DL (ref 6–8.4)
RBC # BLD AUTO: 3.54 M/UL (ref 4–5.4)
RELATIVE EOSINOPHIL (OHS): 0.8 %
RELATIVE LYMPHOCYTE (OHS): 14.4 % (ref 18–48)
RELATIVE MONOCYTE (OHS): 8.2 % (ref 4–15)
RELATIVE NEUTROPHIL (OHS): 76.1 % (ref 38–73)
SODIUM SERPL-SCNC: 140 MMOL/L (ref 136–145)
WBC # BLD AUTO: 6.24 K/UL (ref 3.9–12.7)

## 2025-07-05 PROCEDURE — 25000003 PHARM REV CODE 250: Performed by: STUDENT IN AN ORGANIZED HEALTH CARE EDUCATION/TRAINING PROGRAM

## 2025-07-05 PROCEDURE — 63600175 PHARM REV CODE 636 W HCPCS: Performed by: STUDENT IN AN ORGANIZED HEALTH CARE EDUCATION/TRAINING PROGRAM

## 2025-07-05 PROCEDURE — 94761 N-INVAS EAR/PLS OXIMETRY MLT: CPT

## 2025-07-05 PROCEDURE — 87040 BLOOD CULTURE FOR BACTERIA: CPT | Performed by: STUDENT IN AN ORGANIZED HEALTH CARE EDUCATION/TRAINING PROGRAM

## 2025-07-05 PROCEDURE — 25000242 PHARM REV CODE 250 ALT 637 W/ HCPCS: Performed by: HOSPITALIST

## 2025-07-05 PROCEDURE — 21400001 HC TELEMETRY ROOM

## 2025-07-05 PROCEDURE — 80053 COMPREHEN METABOLIC PANEL: CPT | Performed by: HOSPITALIST

## 2025-07-05 PROCEDURE — 94640 AIRWAY INHALATION TREATMENT: CPT

## 2025-07-05 PROCEDURE — 36415 COLL VENOUS BLD VENIPUNCTURE: CPT | Performed by: STUDENT IN AN ORGANIZED HEALTH CARE EDUCATION/TRAINING PROGRAM

## 2025-07-05 PROCEDURE — 63600175 PHARM REV CODE 636 W HCPCS: Performed by: HOSPITALIST

## 2025-07-05 PROCEDURE — 99223 1ST HOSP IP/OBS HIGH 75: CPT | Mod: NSCH,,, | Performed by: INTERNAL MEDICINE

## 2025-07-05 PROCEDURE — 85025 COMPLETE CBC W/AUTO DIFF WBC: CPT | Performed by: HOSPITALIST

## 2025-07-05 PROCEDURE — 25000003 PHARM REV CODE 250: Performed by: HOSPITALIST

## 2025-07-05 RX ADMIN — ARFORMOTEROL TARTRATE 15 MCG: 15 SOLUTION RESPIRATORY (INHALATION) at 07:07

## 2025-07-05 RX ADMIN — SUCRALFATE 1 G: 1 SUSPENSION ORAL at 11:07

## 2025-07-05 RX ADMIN — ONDANSETRON 4 MG: 2 INJECTION INTRAMUSCULAR; INTRAVENOUS at 08:07

## 2025-07-05 RX ADMIN — FAMOTIDINE 20 MG: 20 TABLET, FILM COATED ORAL at 09:07

## 2025-07-05 RX ADMIN — OXYCODONE HYDROCHLORIDE 5 MG: 5 TABLET ORAL at 05:07

## 2025-07-05 RX ADMIN — ALUMINA, MAGNESIA, AND SIMETHICONE ORAL SUSPENSION REGULAR STRENGTH 30 ML: 1200; 1200; 120 SUSPENSION ORAL at 09:07

## 2025-07-05 RX ADMIN — ALUMINA, MAGNESIA, AND SIMETHICONE ORAL SUSPENSION REGULAR STRENGTH 30 ML: 1200; 1200; 120 SUSPENSION ORAL at 11:07

## 2025-07-05 RX ADMIN — ONDANSETRON 4 MG: 2 INJECTION INTRAMUSCULAR; INTRAVENOUS at 05:07

## 2025-07-05 RX ADMIN — BUDESONIDE INHALATION 0.5 MG: 0.5 SUSPENSION RESPIRATORY (INHALATION) at 07:07

## 2025-07-05 RX ADMIN — ALUMINA, MAGNESIA, AND SIMETHICONE ORAL SUSPENSION REGULAR STRENGTH 30 ML: 1200; 1200; 120 SUSPENSION ORAL at 05:07

## 2025-07-05 RX ADMIN — SUCRALFATE 1 G: 1 SUSPENSION ORAL at 05:07

## 2025-07-05 RX ADMIN — FAMOTIDINE 20 MG: 20 TABLET, FILM COATED ORAL at 08:07

## 2025-07-05 RX ADMIN — TRAZODONE HYDROCHLORIDE 100 MG: 100 TABLET ORAL at 09:07

## 2025-07-05 RX ADMIN — ROPINIROLE HYDROCHLORIDE 2 MG: 1 TABLET, FILM COATED ORAL at 08:07

## 2025-07-05 RX ADMIN — PROCHLORPERAZINE EDISYLATE 5 MG: 5 INJECTION INTRAMUSCULAR; INTRAVENOUS at 11:07

## 2025-07-05 RX ADMIN — ERTAPENEM 1 G: 1 INJECTION INTRAMUSCULAR; INTRAVENOUS at 05:07

## 2025-07-05 RX ADMIN — ROPINIROLE HYDROCHLORIDE 2 MG: 1 TABLET, FILM COATED ORAL at 09:07

## 2025-07-05 RX ADMIN — PANTOPRAZOLE SODIUM 40 MG: 40 TABLET, DELAYED RELEASE ORAL at 09:07

## 2025-07-05 RX ADMIN — OXYCODONE HYDROCHLORIDE 5 MG: 5 TABLET ORAL at 09:07

## 2025-07-05 NOTE — PROGRESS NOTES
[facility] Medicine  Progress Note    Patient Name: Tiffanie Gao  MRN: [id number]  Patient Class: IP- Inpatient   Admission Date: 7/3/2025  Length of Stay: 2 days  Attending Physician: Kwabena Love MD  Primary Care Provider: Meeks, Claude H., MD        Subjective     Principal Problem:Sepsis        HPI:  48 y/o female with PMHx of COPD, asthma, liver disease, cholecystectomy, hysterectomy, partial liver resection, underwent PTC with drain placement in May, who presents as a transfer from [place] with c/o fevers/chills, RUQ pain, and drainage worsening over past several weeks. Chart reviewed via paper records sent by transferring facility. Patient s/p PTC with drain placement, stent placed in bile duct 5/26; she noted discharge around tract site and completed course of antibiotics. Drainage returned past several days. Her indwelling biliary drain was shortened by her surgeon yesterday.     Patient met sepsis criteria on arrival; Temp 102.5, , RR 20, /70, 94% on room air. SBP noted to drop to 80-90's.  WBC 7.06k with 93.2 neutrophils shift. Lactic acid 2.2 --> 0.6 after IV fluids. CXR with no acute findings. UA negative. Covid/Flu negative. 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 Reve iced 2L NS (> 30cc/kg bolus), hypotension resolved, received Zosyn. Admit inpatient for further management.      Overview/Hospital Course:  No notes on file    Interval History:  patient does have soreness at the site of her drain.  On admission she did have more pain that was across the epigastrium but this has improved.  Denies any nausea, vomiting    Review of Systems   All other systems reviewed and are negative.    Objective:     Vital Signs (Most Recent):  Temp: 98.2 °F (36.8 °C) (07/05/25 0734)  Pulse: 84 (07/05/25 0907)  Resp: 18 (07/05/25 0755)  BP: 105/66 (07/05/25 0734)  SpO2: 98 % (07/05/25 0755) Vital Signs (24h  Range):  Temp:  [97.9 °F (36.6 °C)-98.7 °F (37.1 °C)] 98.2 °F (36.8 °C)  Pulse:  [] 84  Resp:  [16-18] 18  SpO2:  [94 %-99 %] 98 %  BP: (100-105)/(61-74) 105/66     Weight: 44.3 kg (97 lb 10.6 oz)  Body mass index is 21.13 kg/m².    Intake/Output Summary (Last 24 hours) at 7/5/2025 1150  Last data filed at 7/5/2025 1018  Gross per 24 hour   Intake 840 ml   Output 70 ml   Net 770 ml         Physical Exam  Constitutional:       General: She is not in acute distress.     Comments: Thin elderly woman, comfortable   Eyes:      Extraocular Movements: Extraocular movements intact.      Pupils: Pupils are equal, round, and reactive to light.   Cardiovascular:      Rate and Rhythm: Normal rate.      Heart sounds: No murmur heard.  Pulmonary:      Effort: Pulmonary effort is normal. No respiratory distress.      Breath sounds: No wheezing.   Abdominal:      General: There is no distension.      Tenderness: There is no abdominal tenderness.      Comments: Percutaneous drain in place still draining.  Mild tenderness at that site however rest of abdominal exam is benign.   Musculoskeletal:         General: No swelling or tenderness.   Skin:     General: Skin is warm and dry.   Neurological:      General: No focal deficit present.      Mental Status: She is alert and oriented to person, place, and time.      Cranial Nerves: No cranial nerve deficit.   Psychiatric:         Mood and Affect: Mood normal.         Behavior: Behavior normal.               Significant Labs: All pertinent labs within the past 24 hours have been reviewed.  CBC:   Recent Labs   Lab 07/04/25  0502 07/05/25  0428   WBC 7.62 6.24   HGB 10.2* 10.3*   HCT 31.1* 30.7*    202     CMP:   Recent Labs   Lab 07/04/25  0502 07/05/25  0428    140   K 3.7 3.6    107   CO2 26 29   GLU 95 91   BUN 7 5*   CREATININE 0.6 0.6   CALCIUM 7.9* 8.3*   PROT 4.7* 5.3*   ALBUMIN 2.1* 2.2*   BILITOT 1.1* 0.7   ALKPHOS 293* 270*   AST 21 12   ALT 24 22    ANIONGAP 3* 4*       Significant Imaging: I have reviewed all pertinent imaging results/findings within the past 24 hours.      Assessment & Plan  Sepsis  Bacteremia due to Escherichia coli  Infection due to ESBL-producing Escherichia coli  -started on ertapenem.  Likely will need 14 days IV antibiotics.  We will need to consult ID for further input.  -plan for repeat cultures in 48 hours.  -tolerating oral intake.    Patient has sepsis without organ dysfunction secondary to Intra-abdominal Infection. A review of systems was completed. Patient's sepsis is worsening. Will continue current treatment    Current Antibiotics    ertapenem (INVANZ) 1 g in 0.9% NaCl 100 mL IVPB (MB+), Every 24 hours (non-standard times), Intravenous    Lactate  Recent Labs   Lab 07/03/25  0743   LACTATE 1.0     Culture Data  Blood Cultures   Blood Culture   Date Value Ref Range Status   07/03/2025 Gram-negative Rods (A)  Preliminary     Comment:     Identification and susceptibility pending   07/03/2025 Gram-negative Rods (A)  Preliminary     Comment:     Identification pending   05/30/2025 No Growth After 5 Days  Final   05/30/2025 No Growth After 5 Days  Final      mSOFA  MSOFA Total  Min: 0   Min taken time: 07/05/25 1101  Max: 0   Max taken time: 07/05/25 1101      Biliary drain displacement  -CT A/P revealed 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   -IR performed cholangiogram with findings of a focal stenosis however contrast was flowing easily through that region.  See imaging findings for complete details.    -Patient reports plans for removal on 7/7/25 in [place]. Her pain has improved, therefore will do capping trial and see how she does over the next 24-48 hours.  Then can discuss removal by IR here if possible.  -Multimodal pain control requiring IV and PO narcotics    COPD (chronic obstructive pulmonary disease)  Patient's COPD is controlled currently.  Patient is currently  off COPD Pathway. Continue scheduled inhalers Antibiotics and monitor respiratory status closely.   GERD (gastroesophageal reflux disease)  Resume home medications  On famotidine and protonix    Anxiety  Chronic, stable  Resume home medications  On Xanax, [place]-PDMP reviewed    VTE Risk Mitigation (From admission, onward)           Ordered     IP VTE HIGH RISK PATIENT  Once         07/03/25 0720     Place sequential compression device  Until discontinued         07/03/25 0720                    Discharge Planning   ALLAN: 7/7/2025     Code Status: Full Code   Medical Readiness for Discharge Date:   Discharge Plan A: Home                        Kwabena Love MD  Department of Hospital Medicine   [facility] - Telemetry ([facility])

## 2025-07-05 NOTE — ASSESSMENT & PLAN NOTE
-started on ertapenem.  Likely will need 14 days IV antibiotics.  We will need to consult ID for further input.  -plan for repeat cultures in 48 hours.  -tolerating oral intake.    Patient has sepsis without organ dysfunction secondary to Intra-abdominal Infection. A review of systems was completed. Patient's sepsis is worsening. Will continue current treatment    Current Antibiotics    ertapenem (INVANZ) 1 g in 0.9% NaCl 100 mL IVPB (MB+), Every 24 hours (non-standard times), Intravenous    Lactate  Recent Labs   Lab 07/03/25  0743   LACTATE 1.0     Culture Data  Blood Cultures   Blood Culture   Date Value Ref Range Status   07/03/2025 Gram-negative Rods (A)  Preliminary     Comment:     Identification and susceptibility pending   07/03/2025 Gram-negative Rods (A)  Preliminary     Comment:     Identification pending   05/30/2025 No Growth After 5 Days  Final   05/30/2025 No Growth After 5 Days  Final      mSOFA  MSOFA Total  Min: 0   Min taken time: 07/05/25 1101  Max: 0   Max taken time: 07/05/25 1101

## 2025-07-05 NOTE — PLAN OF CARE
A248/A248 YARITZAKj Gao is a 49 y.o.female admitted on 7/3/2025 for Sepsis   Code Status: Full Code MRN: [id number]   Review of patient's allergies indicates:   Allergen Reactions    Sumatriptan Nausea And Vomiting     Other reaction(s): vomiting     Past Medical History:   Diagnosis Date    Asthma     COPD (chronic obstructive pulmonary disease)     Liver disease     Mental disorder       PRN meds    albuterol-ipratropium, 3 mL, Q6H PRN  ALPRAZolam, 0.5 mg, BID PRN  dextrose 50%, 12.5 g, PRN  dextrose 50%, 25 g, PRN  glucagon (human recombinant), 1 mg, PRN  glucose, 16 g, PRN  glucose, 24 g, PRN  melatonin, 6 mg, Nightly PRN  morphine, 2 mg, Q3H PRN  naloxone, 0.02 mg, PRN  ondansetron, 4 mg, Q6H PRN  oxyCODONE, 5 mg, Q4H PRN  prochlorperazine, 5 mg, Q6H PRN  simethicone, 1 tablet, QID PRN  sodium chloride 0.9%, 10 mL, Q12H PRN      Chart check completed. Will continue plan of care.   Drain Clamped.   PRN medication given for pain.      Orientation: oriented x 4  Cesilia Coma Scale Score: 15     Lead Monitored: Lead II Rhythm: normal sinus rhythm    Cardiac/Telemetry Box Number: [id number]    Last Bowel Movement: 07/02/25  Diet Adult Regular     Bruce Score: 23  Fall Risk Score: 6  Accucheck []   Freq?      Lines/Drains/Airways       Drain  Duration                  Biliary Tube 06/30/25 0857  10 Fr. LUQ 5 days              Peripheral Intravenous Line  Duration             Peripheral IV 07/02/25 20 G Posterior;Right Hand 3 days

## 2025-07-05 NOTE — SUBJECTIVE & OBJECTIVE
Interval History:  patient does have soreness at the site of her drain.  On admission she did have more pain that was across the epigastrium but this has improved.  Denies any nausea, vomiting    Review of Systems   All other systems reviewed and are negative.    Objective:     Vital Signs (Most Recent):  Temp: 98.2 °F (36.8 °C) (07/05/25 0734)  Pulse: 84 (07/05/25 0907)  Resp: 18 (07/05/25 0755)  BP: 105/66 (07/05/25 0734)  SpO2: 98 % (07/05/25 0755) Vital Signs (24h Range):  Temp:  [97.9 °F (36.6 °C)-98.7 °F (37.1 °C)] 98.2 °F (36.8 °C)  Pulse:  [] 84  Resp:  [16-18] 18  SpO2:  [94 %-99 %] 98 %  BP: (100-105)/(61-74) 105/66     Weight: 44.3 kg (97 lb 10.6 oz)  Body mass index is 21.13 kg/m².    Intake/Output Summary (Last 24 hours) at 7/5/2025 1150  Last data filed at 7/5/2025 1018  Gross per 24 hour   Intake 840 ml   Output 70 ml   Net 770 ml         Physical Exam  Constitutional:       General: She is not in acute distress.     Comments: Thin elderly woman, comfortable   Eyes:      Extraocular Movements: Extraocular movements intact.      Pupils: Pupils are equal, round, and reactive to light.   Cardiovascular:      Rate and Rhythm: Normal rate.      Heart sounds: No murmur heard.  Pulmonary:      Effort: Pulmonary effort is normal. No respiratory distress.      Breath sounds: No wheezing.   Abdominal:      General: There is no distension.      Tenderness: There is no abdominal tenderness.      Comments: Percutaneous drain in place still draining.  Mild tenderness at that site however rest of abdominal exam is benign.   Musculoskeletal:         General: No swelling or tenderness.   Skin:     General: Skin is warm and dry.   Neurological:      General: No focal deficit present.      Mental Status: She is alert and oriented to person, place, and time.      Cranial Nerves: No cranial nerve deficit.   Psychiatric:         Mood and Affect: Mood normal.         Behavior: Behavior normal.                Significant Labs: All pertinent labs within the past 24 hours have been reviewed.  CBC:   Recent Labs   Lab 07/04/25  0502 07/05/25  0428   WBC 7.62 6.24   HGB 10.2* 10.3*   HCT 31.1* 30.7*    202     CMP:   Recent Labs   Lab 07/04/25  0502 07/05/25  0428    140   K 3.7 3.6    107   CO2 26 29   GLU 95 91   BUN 7 5*   CREATININE 0.6 0.6   CALCIUM 7.9* 8.3*   PROT 4.7* 5.3*   ALBUMIN 2.1* 2.2*   BILITOT 1.1* 0.7   ALKPHOS 293* 270*   AST 21 12   ALT 24 22   ANIONGAP 3* 4*       Significant Imaging: I have reviewed all pertinent imaging results/findings within the past 24 hours.

## 2025-07-05 NOTE — ASSESSMENT & PLAN NOTE
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   -IR performed cholangiogram with findings of a focal stenosis however contrast was flowing easily through that region.  See imaging findings for complete details.    -Patient reports plans for removal on 7/7/25 in [place]. Her pain has improved, therefore will do capping trial and see how she does over the next 24-48 hours.  Then can discuss removal by IR here if possible.  -Multimodal pain control requiring IV and PO narcotics

## 2025-07-06 LAB
ABSOLUTE EOSINOPHIL (OHS): 0.1 K/UL
ABSOLUTE MONOCYTE (OHS): 0.38 K/UL (ref 0.3–1)
ABSOLUTE NEUTROPHIL COUNT (OHS): 2.52 K/UL (ref 1.8–7.7)
ALBUMIN SERPL BCP-MCNC: 2.2 G/DL (ref 3.5–5.2)
ALP SERPL-CCNC: 256 UNIT/L (ref 40–150)
ALT SERPL W/O P-5'-P-CCNC: 16 UNIT/L (ref 10–44)
ANION GAP (OHS): 4 MMOL/L (ref 8–16)
AST SERPL-CCNC: 12 UNIT/L (ref 11–45)
BACTERIA BLD CULT: ABNORMAL
BACTERIA BLD CULT: ABNORMAL
BASOPHILS # BLD AUTO: 0.04 K/UL
BASOPHILS NFR BLD AUTO: 0.9 %
BILIRUB SERPL-MCNC: 0.4 MG/DL (ref 0.1–1)
BUN SERPL-MCNC: 5 MG/DL (ref 6–20)
CALCIUM SERPL-MCNC: 8.3 MG/DL (ref 8.7–10.5)
CHLORIDE SERPL-SCNC: 107 MMOL/L (ref 95–110)
CO2 SERPL-SCNC: 31 MMOL/L (ref 23–29)
CREAT SERPL-MCNC: 0.6 MG/DL (ref 0.5–1.4)
ERYTHROCYTE [DISTWIDTH] IN BLOOD BY AUTOMATED COUNT: 13.5 % (ref 11.5–14.5)
GFR SERPLBLD CREATININE-BSD FMLA CKD-EPI: >60 ML/MIN/1.73/M2
GLUCOSE SERPL-MCNC: 81 MG/DL (ref 70–110)
GRAM STN SPEC: ABNORMAL
HCT VFR BLD AUTO: 30.3 % (ref 37–48.5)
HGB BLD-MCNC: 10.2 GM/DL (ref 12–16)
IMM GRANULOCYTES # BLD AUTO: 0.02 K/UL (ref 0–0.04)
IMM GRANULOCYTES NFR BLD AUTO: 0.4 % (ref 0–0.5)
LYMPHOCYTES # BLD AUTO: 1.54 K/UL (ref 1–4.8)
MCH RBC QN AUTO: 29.2 PG (ref 27–31)
MCHC RBC AUTO-ENTMCNC: 33.7 G/DL (ref 32–36)
MCV RBC AUTO: 87 FL (ref 82–98)
NUCLEATED RBC (/100WBC) (OHS): 0 /100 WBC
PLATELET # BLD AUTO: 213 K/UL (ref 150–450)
PMV BLD AUTO: 10.1 FL (ref 9.2–12.9)
POTASSIUM SERPL-SCNC: 3.6 MMOL/L (ref 3.5–5.1)
PROT SERPL-MCNC: 5.1 GM/DL (ref 6–8.4)
RBC # BLD AUTO: 3.49 M/UL (ref 4–5.4)
RELATIVE EOSINOPHIL (OHS): 2.2 %
RELATIVE LYMPHOCYTE (OHS): 33.5 % (ref 18–48)
RELATIVE MONOCYTE (OHS): 8.3 % (ref 4–15)
RELATIVE NEUTROPHIL (OHS): 54.7 % (ref 38–73)
SODIUM SERPL-SCNC: 142 MMOL/L (ref 136–145)
WBC # BLD AUTO: 4.6 K/UL (ref 3.9–12.7)

## 2025-07-06 PROCEDURE — 25000003 PHARM REV CODE 250: Performed by: HOSPITALIST

## 2025-07-06 PROCEDURE — 21400001 HC TELEMETRY ROOM

## 2025-07-06 PROCEDURE — 25000003 PHARM REV CODE 250: Performed by: NURSE PRACTITIONER

## 2025-07-06 PROCEDURE — 36415 COLL VENOUS BLD VENIPUNCTURE: CPT | Performed by: STUDENT IN AN ORGANIZED HEALTH CARE EDUCATION/TRAINING PROGRAM

## 2025-07-06 PROCEDURE — 25000242 PHARM REV CODE 250 ALT 637 W/ HCPCS: Performed by: HOSPITALIST

## 2025-07-06 PROCEDURE — 25000003 PHARM REV CODE 250: Performed by: INTERNAL MEDICINE

## 2025-07-06 PROCEDURE — 25000003 PHARM REV CODE 250: Performed by: STUDENT IN AN ORGANIZED HEALTH CARE EDUCATION/TRAINING PROGRAM

## 2025-07-06 PROCEDURE — 63600175 PHARM REV CODE 636 W HCPCS: Performed by: INTERNAL MEDICINE

## 2025-07-06 PROCEDURE — 82040 ASSAY OF SERUM ALBUMIN: CPT | Performed by: STUDENT IN AN ORGANIZED HEALTH CARE EDUCATION/TRAINING PROGRAM

## 2025-07-06 PROCEDURE — 94640 AIRWAY INHALATION TREATMENT: CPT

## 2025-07-06 PROCEDURE — 85025 COMPLETE CBC W/AUTO DIFF WBC: CPT | Performed by: STUDENT IN AN ORGANIZED HEALTH CARE EDUCATION/TRAINING PROGRAM

## 2025-07-06 PROCEDURE — 63600175 PHARM REV CODE 636 W HCPCS: Performed by: HOSPITALIST

## 2025-07-06 PROCEDURE — 94761 N-INVAS EAR/PLS OXIMETRY MLT: CPT

## 2025-07-06 RX ORDER — POLYETHYLENE GLYCOL 3350 17 G/17G
17 POWDER, FOR SOLUTION ORAL 2 TIMES DAILY PRN
Status: DISCONTINUED | OUTPATIENT
Start: 2025-07-06 | End: 2025-07-08 | Stop reason: HOSPADM

## 2025-07-06 RX ORDER — POLYETHYLENE GLYCOL 3350 17 G/17G
17 POWDER, FOR SOLUTION ORAL ONCE
Status: COMPLETED | OUTPATIENT
Start: 2025-07-06 | End: 2025-07-06

## 2025-07-06 RX ADMIN — PANTOPRAZOLE SODIUM 40 MG: 40 TABLET, DELAYED RELEASE ORAL at 08:07

## 2025-07-06 RX ADMIN — ARFORMOTEROL TARTRATE 15 MCG: 15 SOLUTION RESPIRATORY (INHALATION) at 08:07

## 2025-07-06 RX ADMIN — OXYCODONE HYDROCHLORIDE 5 MG: 5 TABLET ORAL at 03:07

## 2025-07-06 RX ADMIN — FAMOTIDINE 20 MG: 20 TABLET, FILM COATED ORAL at 08:07

## 2025-07-06 RX ADMIN — ALUMINA, MAGNESIA, AND SIMETHICONE ORAL SUSPENSION REGULAR STRENGTH 30 ML: 1200; 1200; 120 SUSPENSION ORAL at 03:07

## 2025-07-06 RX ADMIN — SUCRALFATE 1 G: 1 SUSPENSION ORAL at 11:07

## 2025-07-06 RX ADMIN — ALUMINA, MAGNESIA, AND SIMETHICONE ORAL SUSPENSION REGULAR STRENGTH 30 ML: 1200; 1200; 120 SUSPENSION ORAL at 11:07

## 2025-07-06 RX ADMIN — ALUMINA, MAGNESIA, AND SIMETHICONE ORAL SUSPENSION REGULAR STRENGTH 30 ML: 1200; 1200; 120 SUSPENSION ORAL at 06:07

## 2025-07-06 RX ADMIN — POLYETHYLENE GLYCOL 3350 17 G: 17 POWDER, FOR SOLUTION ORAL at 09:07

## 2025-07-06 RX ADMIN — SUCRALFATE 1 G: 1 SUSPENSION ORAL at 04:07

## 2025-07-06 RX ADMIN — SUCRALFATE 1 G: 1 SUSPENSION ORAL at 05:07

## 2025-07-06 RX ADMIN — ROPINIROLE HYDROCHLORIDE 2 MG: 1 TABLET, FILM COATED ORAL at 08:07

## 2025-07-06 RX ADMIN — MEROPENEM 2 G: 2 INJECTION, POWDER, FOR SOLUTION INTRAVENOUS at 08:07

## 2025-07-06 RX ADMIN — MEROPENEM 2 G: 2 INJECTION, POWDER, FOR SOLUTION INTRAVENOUS at 03:07

## 2025-07-06 RX ADMIN — BUDESONIDE INHALATION 0.5 MG: 0.5 SUSPENSION RESPIRATORY (INHALATION) at 08:07

## 2025-07-06 RX ADMIN — TRAZODONE HYDROCHLORIDE 100 MG: 100 TABLET ORAL at 08:07

## 2025-07-06 RX ADMIN — POLYETHYLENE GLYCOL 3350 17 G: 17 POWDER, FOR SOLUTION ORAL at 10:07

## 2025-07-06 RX ADMIN — ONDANSETRON 4 MG: 2 INJECTION INTRAMUSCULAR; INTRAVENOUS at 03:07

## 2025-07-06 NOTE — SUBJECTIVE & OBJECTIVE
Past Medical History:   Diagnosis Date    Asthma     COPD (chronic obstructive pulmonary disease)     Liver disease     Mental disorder        Past Surgical History:   Procedure Laterality Date    Bile duct reconstruction      CHOLECYSTECTOMY      HYSTERECTOMY      partial liver resection         Review of patient's allergies indicates:   Allergen Reactions    Sumatriptan Nausea And Vomiting     Other reaction(s): vomiting       Medications:  Medications Prior to Admission   Medication Sig    albuterol (PROVENTIL/VENTOLIN HFA) 90 mcg/actuation inhaler Inhale into the lungs.    albuterol-ipratropium (DUO-NEB) 2.5 mg-0.5 mg/3 mL nebulizer solution Inhale 3 mLs into the lungs every 6 (six) hours as needed.    alendronate (FOSAMAX) 70 MG tablet Take 70 mg by mouth every 7 days.    ALPRAZolam (XANAX) 0.5 MG tablet Take 0.5 mg by mouth daily as needed.    CAPLYTA 42 mg Cap Take 1 capsule by mouth Daily.    famotidine (PEPCID) 40 MG tablet Take 40 mg by mouth.    ondansetron (ZOFRAN-ODT) 4 MG TbDL Take 4 mg by mouth every 6 (six) hours as needed.    pantoprazole (PROTONIX) 40 MG tablet Take 40 mg by mouth every evening.    rOPINIRole (REQUIP) 2 MG tablet Take 2 mg by mouth 2 (two) times daily.    SYMBICORT 160-4.5 mcg/actuation HFAA Inhale 2 puffs into the lungs every 12 (twelve) hours.    traZODone (DESYREL) 100 MG tablet Take 100 mg by mouth every evening.     Antibiotics (From admission, onward)      Start     Stop Route Frequency Ordered    07/04/25 1730  ertapenem (INVANZ) 1 g in 0.9% NaCl 100 mL IVPB (MB+)         -- IV Every 24 hours (non-standard times) 07/04/25 1624          Antifungals (From admission, onward)      None          Antivirals (From admission, onward)      None               There is no immunization history on file for this patient.    Family History       Problem Relation (Age of Onset)    Cancer Maternal Grandmother    Diabetes Maternal Grandmother    Heart disease Paternal Grandmother, Maternal  Grandmother    Hypertension Father, Mother          Social History     Socioeconomic History    Marital status:    Tobacco Use    Smoking status: Never    Smokeless tobacco: Never   Substance and Sexual Activity    Alcohol use: Not Currently    Drug use: Never    Sexual activity: Not Currently     Social Drivers of Health     Financial Resource Strain: Low Risk  (7/3/2025)    Overall Financial Resource Strain (CARDIA)     Difficulty of Paying Living Expenses: Not hard at all   Recent Concern: Financial Resource Strain - Medium Risk (5/9/2025)    Overall Financial Resource Strain (CARDIA)     Difficulty of Paying Living Expenses: Somewhat hard   Food Insecurity: Food Insecurity Present (7/3/2025)    Hunger Vital Sign     Worried About Running Out of Food in the Last Year: Sometimes true     Ran Out of Food in the Last Year: Sometimes true   Transportation Needs: No Transportation Needs (7/3/2025)    PRAPARE - Transportation     Lack of Transportation (Medical): No     Lack of Transportation (Non-Medical): No   Physical Activity: Inactive (5/9/2025)    Exercise Vital Sign     Days of Exercise per Week: 0 days     Minutes of Exercise per Session: 10 min   Stress: Stress Concern Present (7/3/2025)    [employer] [place] [employer] - Occupational Stress Questionnaire     Feeling of Stress : Very much   Housing Stability: Low Risk  (7/3/2025)    Housing Stability Vital Sign     Unable to Pay for Housing in the Last Year: No     Number of Times Moved in the Last Year: 0     Homeless in the Last Year: No     Review of Systems   Constitutional:  Negative for activity change, appetite change, chills, diaphoresis and fatigue.   HENT:  Negative for congestion.    Neurological:  Negative for dizziness and facial asymmetry.     Objective:     Vital Signs (Most Recent):  Temp: 97.9 °F (36.6 °C) (07/06/25 0402)  Pulse: 72 (07/06/25 0500)  Resp: 18 (07/06/25 0402)  BP: (!) 95/59 (07/06/25 0402)  SpO2: 95 % (07/06/25  "0402) Vital Signs (24h Range):  Temp:  [97.6 °F (36.4 °C)-98.2 °F (36.8 °C)] 97.9 °F (36.6 °C)  Pulse:  [59-90] 72  Resp:  [16-18] 18  SpO2:  [95 %-100 %] 95 %  BP: ()/(59-74) 95/59     Weight: 44.2 kg (97 lb 7.1 oz)  Body mass index is 21.09 kg/m².    Estimated Creatinine Clearance: 77.4 mL/min (based on SCr of 0.6 mg/dL).     Physical Exam  Vitals and nursing note reviewed.   Constitutional:       Appearance: She is ill-appearing.   HENT:      Head: Normocephalic.   Eyes:      Pupils: Pupils are equal, round, and reactive to light.   Musculoskeletal:         General: Normal range of motion.   Skin:     Coloration: Skin is not jaundiced.      Findings: No bruising or lesion.          Significant Labs: Blood Culture: No results for input(s): "LABBLOO" in the last 4320 hours.  BMP:   Recent Labs   Lab 07/06/25  0455   GLU 81      K 3.6      CO2 31*   BUN 5*   CREATININE 0.6   CALCIUM 8.3*     C4 Count: No results for input(s): "C4" in the last 48 hours.  CBC:   Recent Labs   Lab 07/05/25 0428 07/06/25  0455   WBC 6.24 4.60   HGB 10.3* 10.2*   HCT 30.7* 30.3*    213     CMP:   Recent Labs   Lab 07/05/25 0428 07/06/25  0455    142   K 3.6 3.6    107   CO2 29 31*   GLU 91 81   BUN 5* 5*   CREATININE 0.6 0.6   CALCIUM 8.3* 8.3*   PROT 5.3* 5.1*   ALBUMIN 2.2* 2.2*   BILITOT 0.7 0.4   ALKPHOS 270* 256*   AST 12 12   ALT 22 16   ANIONGAP 4* 4*     Microbiology Results (last 7 days)       Procedure Component Value Units Date/Time    Blood culture [[id number]]  (Normal) Collected: 07/05/25 0428    Order Status: Completed Specimen: Blood from Peripheral, Antecubital, Left Updated: 07/05/25 2101     Blood Culture No Growth After 6 Hours    Blood culture [[id number]]  (Normal) Collected: 07/05/25 0428    Order Status: Completed Specimen: Blood from Peripheral, Antecubital, Right Updated: 07/05/25 2101     Blood Culture No Growth After 6 Hours    Blood culture (site 2) [[id number]]  " (Abnormal) Collected: 07/03/25 0743    Order Status: Completed Specimen: Blood Updated: 07/05/25 1210     Blood Culture Escherichia coli     GRAM STAIN Gram Negative Rods     Comment: Aerobic & Anaerobic Bottles Positive       Narrative:      For susceptibility see order number [id number]    Aerobic and Anaerobic Bottles Positive     Blood culture (site 1) [[id number]]  (Abnormal) Collected: 07/03/25 0743    Order Status: Completed Specimen: Blood Updated: 07/05/25 1210     Blood Culture Escherichia coli     Comment: Susceptibility pending        GRAM STAIN Gram Negative Rods     Comment: Aerobic Bottle Positive         Gram Negative Rods     Comment: Anaerobic Bottle Positive       Rapid Organism ID by PCR (from Blood culture) [[id number]]  (Abnormal) Collected: 07/03/25 0743    Order Status: Completed Specimen: Blood Updated: 07/04/25 1231     Enterococcus faecalis Not Detected     Enterococcus faecium Not Detected     Listeria monocytogenes Not Detected     Staphylococcus spp. Not Detected     Staphylococcus aureus Not Detected     Staphylococcus epidermidis Not Detected     Staphylococcus lugdunensis Not Detected     Streptococcus spp. Not Detected     Streptococcus agalactiae (Group B) Not Detected     Streptococcus pneumoniae Not Detected     Streptococcus pyogenes (Group A) Not Detected     Acinetobacter calcoaceticus/baumannii complex Not Detected     Bacteroides fragilis Not Detected     Enterobacterales See Species for ID     Enterobacter cloacae complex Not Detected     Escherichia coli Detected     Klebsiella aerogenes Not Detected     Klebsiella oxytoca Not Detected     Klebsiella pneumoniae group Not Detected     Proteus spp. Not Detected     Salmonella spp. Not Detected     Serratia marcescens Not Detected     Haemophilus influenzae Not Detected     Neisseria meningitidis Not Detected     Pseudomonas aeruginosa Not Detected     Stenotrophomonas maltophilia Not Detected     Candida albicans Not  Detected     Candida auris Not Detected     Candida glabrata Not Detected     Candida krusei Not Detected     Candida parapsilosis Not Detected     Candida tropicalis Not Detected     Cryptococcus neoformans/gattii Not Detected     CTX-M (ESBL ) Detected     Comment: Note: Antimicrobial resistance can occur via multiple mechanisms. A Not Detected result for antimicrobial resistance gene(s) does not indicate antimicrobial susceptibility. Subculturing is required for species identification and susceptibility testing of   isolates.        IMP (Cabapenemase ) Not Detected     Comment: Note: Antimicrobial resistance can occur via multiple mechanisms. A Not Detected result for antimicrobial resistance gene(s) does not indicate antimicrobial susceptibility. Subculturing is required for species identification and susceptibility testing of   isolates.        KPC resistance gene (Carbapenemase ) Not Detected     Comment: Note: Antimicrobial resistance can occur via multiple mechanisms. A Not Detected result for antimicrobial resistance gene(s) does not indicate antimicrobial susceptibility. Subculturing is required for species identification and susceptibility testing of   isolates.        mcr-1 Not Detected     Comment: Note: Antimicrobial resistance can occur via multiple mechanisms. A Not Detected result for antimicrobial resistance gene(s) does not indicate antimicrobial susceptibility. Subculturing is required for species identification and susceptibility testing of   isolates.        mecA ID N/A     Comment: Note: Antimicrobial resistance can occur via multiple mechanisms. A Not Detected result for antimicrobial resistance gene(s) does not indicate antimicrobial susceptibility. Subculturing is required for species identification and susceptibility testing of   isolates.        mecA/C and MREJ (MRSA) gene N/A     Comment: Note: Antimicrobial resistance can occur via multiple mechanisms. A Not  Detected result for antimicrobial resistance gene(s) does not indicate antimicrobial susceptibility. Subculturing is required for species identification and susceptibility testing of   isolates.        NDM (Carbapenemase ) Not Detected     Comment: Note: Antimicrobial resistance can occur via multiple mechanisms. A Not Detected result for antimicrobial resistance gene(s) does not indicate antimicrobial susceptibility. Subculturing is required for species identification and susceptibility testing of   isolates.        OXA-48-like (Carbapenemase ) Not Detected     Comment: Note: Antimicrobial resistance can occur via multiple mechanisms. A Not Detected result for antimicrobial resistance gene(s) does not indicate antimicrobial susceptibility. Subculturing is required for species identification and susceptibility testing of   isolates.        Pamela/B (VRE gene) N/A     Comment: Note: Antimicrobial resistance can occur via multiple mechanisms. A Not Detected result for antimicrobial resistance gene(s) does not indicate antimicrobial susceptibility. Subculturing is required for species identification and susceptibility testing of   isolates.        VIM (Carbapenemase ) Not Detected     Comment: Note: Antimicrobial resistance can occur via multiple mechanisms. A Not Detected result for antimicrobial resistance gene(s) does not indicate antimicrobial susceptibility. Subculturing is required for species identification and susceptibility testing of   isolates.       Rapid Organism ID by PCR (from Blood culture) [[id number]]  (Abnormal) Collected: 07/03/25 0743    Order Status: Completed Specimen: Blood Updated: 07/04/25 0726     Enterococcus faecalis Not Detected     Enterococcus faecium Not Detected     Listeria monocytogenes Not Detected     Staphylococcus spp. Not Detected     Staphylococcus aureus Not Detected     Staphylococcus epidermidis Not Detected     Staphylococcus lugdunensis Not Detected      Streptococcus spp. Not Detected     Streptococcus agalactiae (Group B) Not Detected     Streptococcus pneumoniae Not Detected     Streptococcus pyogenes (Group A) Not Detected     Acinetobacter calcoaceticus/baumannii complex Not Detected     Bacteroides fragilis Not Detected     Enterobacterales See Species for ID     Enterobacter cloacae complex Not Detected     Escherichia coli Detected     Klebsiella aerogenes Not Detected     Klebsiella oxytoca Not Detected     Klebsiella pneumoniae group Not Detected     Proteus spp. Not Detected     Salmonella spp. Not Detected     Serratia marcescens Not Detected     Haemophilus influenzae Not Detected     Neisseria meningitidis Not Detected     Pseudomonas aeruginosa Not Detected     Stenotrophomonas maltophilia Not Detected     Candida albicans Not Detected     Candida auris Not Detected     Candida glabrata Not Detected     Candida krusei Not Detected     Candida parapsilosis Not Detected     Candida tropicalis Not Detected     Cryptococcus neoformans/gattii Not Detected     CTX-M (ESBL ) Detected     Comment: Note: Antimicrobial resistance can occur via multiple mechanisms. A Not Detected result for antimicrobial resistance gene(s) does not indicate antimicrobial susceptibility. Subculturing is required for species identification and susceptibility testing of   isolates.        IMP (Cabapenemase ) Not Detected     Comment: Note: Antimicrobial resistance can occur via multiple mechanisms. A Not Detected result for antimicrobial resistance gene(s) does not indicate antimicrobial susceptibility. Subculturing is required for species identification and susceptibility testing of   isolates.        KPC resistance gene (Carbapenemase ) Not Detected     Comment: Note: Antimicrobial resistance can occur via multiple mechanisms. A Not Detected result for antimicrobial resistance gene(s) does not indicate antimicrobial susceptibility. Subculturing is  required for species identification and susceptibility testing of   isolates.        mcr-1 Not Detected     Comment: Note: Antimicrobial resistance can occur via multiple mechanisms. A Not Detected result for antimicrobial resistance gene(s) does not indicate antimicrobial susceptibility. Subculturing is required for species identification and susceptibility testing of   isolates.        mecA ID N/A     Comment: Note: Antimicrobial resistance can occur via multiple mechanisms. A Not Detected result for antimicrobial resistance gene(s) does not indicate antimicrobial susceptibility. Subculturing is required for species identification and susceptibility testing of   isolates.        mecA/C and MREJ (MRSA) gene N/A     Comment: Note: Antimicrobial resistance can occur via multiple mechanisms. A Not Detected result for antimicrobial resistance gene(s) does not indicate antimicrobial susceptibility. Subculturing is required for species identification and susceptibility testing of   isolates.        NDM (Carbapenemase ) Not Detected     Comment: Note: Antimicrobial resistance can occur via multiple mechanisms. A Not Detected result for antimicrobial resistance gene(s) does not indicate antimicrobial susceptibility. Subculturing is required for species identification and susceptibility testing of   isolates.        OXA-48-like (Carbapenemase ) Not Detected     Comment: Note: Antimicrobial resistance can occur via multiple mechanisms. A Not Detected result for antimicrobial resistance gene(s) does not indicate antimicrobial susceptibility. Subculturing is required for species identification and susceptibility testing of   isolates.        Pamela/B (VRE gene) N/A     Comment: Note: Antimicrobial resistance can occur via multiple mechanisms. A Not Detected result for antimicrobial resistance gene(s) does not indicate antimicrobial susceptibility. Subculturing is required for species identification and  susceptibility testing of   isolates.        VIM (Carbapenemase ) Not Detected     Comment: Note: Antimicrobial resistance can occur via multiple mechanisms. A Not Detected result for antimicrobial resistance gene(s) does not indicate antimicrobial susceptibility. Subculturing is required for species identification and susceptibility testing of   isolates.               All pertinent labs within the past 24 hours have been reviewed.    Significant Imaging: I have reviewed all pertinent imaging results/findings within the past 24 hours.

## 2025-07-06 NOTE — PROGRESS NOTES
Pharmacist Renal Dose Adjustment Note    Tiffanie aGo is a 49 y.o. female being treated with the medication meropenem    Patient Data:    Vital Signs (Most Recent):  Temp: 97.9 °F (36.6 °C) (07/06/25 0402)  Pulse: 72 (07/06/25 0500)  Resp: 18 (07/06/25 0402)  BP: (!) 95/59 (07/06/25 0402)  SpO2: 95 % (07/06/25 0402) Vital Signs (72h Range):  Temp:  [97.6 °F (36.4 °C)-99.7 °F (37.6 °C)]   Pulse:  []   Resp:  [16-20]   BP: ()/(52-75)   SpO2:  [94 %-100 %]      Recent Labs   Lab 07/04/25  0502 07/05/25  0428 07/06/25  0455   CREATININE 0.6 0.6 0.6     Serum creatinine: 0.6 mg/dL 07/06/25 0455  Estimated creatinine clearance: 77.4 mL/min    Meropenem 1 g q8h will be changed to meropenem 2 g q8h for CrCl >50 mL/min and severe infection (indication: bacteremia).    Pharmacist's Name: Annie Whyte  Pharmacist's Extension: [phone]

## 2025-07-06 NOTE — HPI
48 y/o female with PMHx of COPD, asthma, liver disease, cholecystectomy, hysterectomy, partial liver resection, underwent PTC with drain placement in May,.  She was transferred from [place] with c/o fevers/chills, RUQ pain, and drainage worsening over past several weeks.   She had  PTC with drain placement, stent placed in bile duct 5/26; she noted discharge around tract site and completed course of antibiotics.  She was noted to have fever-T-max 102.5° on admit.  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   Blood culture 0703 showed ESBL E coli

## 2025-07-06 NOTE — SUBJECTIVE & OBJECTIVE
Interval History:  patient doing well this morning.  When drain was capped, she did feel better, tolerating her diet without any nausea, vomiting, pain in the abdomen.    Review of Systems   All other systems reviewed and are negative.    Objective:     Vital Signs (Most Recent):  Temp: 97.5 °F (36.4 °C) (07/06/25 1224)  Pulse: 77 (07/06/25 1224)  Resp: 18 (07/06/25 1224)  BP: 128/71 (07/06/25 1224)  SpO2: 100 % (07/06/25 1224) Vital Signs (24h Range):  Temp:  [97.5 °F (36.4 °C)-97.9 °F (36.6 °C)] 97.5 °F (36.4 °C)  Pulse:  [59-92] 77  Resp:  [16-18] 18  SpO2:  [95 %-100 %] 100 %  BP: ()/(58-74) 128/71     Weight: 44.2 kg (97 lb 7.1 oz)  Body mass index is 21.09 kg/m².    Intake/Output Summary (Last 24 hours) at 7/6/2025 1237  Last data filed at 7/6/2025 0402  Gross per 24 hour   Intake 192.23 ml   Output 1100 ml   Net -907.77 ml         Physical Exam  Vitals and nursing note reviewed.   Constitutional:       General: She is not in acute distress.     Comments: Thin elderly woman, comfortable   HENT:      Head: Normocephalic.   Eyes:      Extraocular Movements: Extraocular movements intact.      Pupils: Pupils are equal, round, and reactive to light.   Cardiovascular:      Rate and Rhythm: Normal rate.      Heart sounds: No murmur heard.  Pulmonary:      Effort: Pulmonary effort is normal. No respiratory distress.      Breath sounds: No wheezing.   Abdominal:      General: There is no distension.      Tenderness: There is no abdominal tenderness.      Comments: Biliary drain capped, no tenderness in the abdomen.   Musculoskeletal:         General: No swelling or tenderness. Normal range of motion.   Skin:     General: Skin is warm and dry.      Coloration: Skin is not jaundiced.      Findings: No bruising or lesion.   Neurological:      General: No focal deficit present.      Mental Status: She is alert and oriented to person, place, and time.      Cranial Nerves: No cranial nerve deficit.   Psychiatric:          Mood and Affect: Mood normal.         Behavior: Behavior normal.               Significant Labs: All pertinent labs within the past 24 hours have been reviewed.  CBC:   Recent Labs   Lab 07/05/25 0428 07/06/25  0455   WBC 6.24 4.60   HGB 10.3* 10.2*   HCT 30.7* 30.3*    213     CMP:   Recent Labs   Lab 07/05/25 0428 07/06/25  0455    142   K 3.6 3.6    107   CO2 29 31*   GLU 91 81   BUN 5* 5*   CREATININE 0.6 0.6   CALCIUM 8.3* 8.3*   PROT 5.3* 5.1*   ALBUMIN 2.2* 2.2*   BILITOT 0.7 0.4   ALKPHOS 270* 256*   AST 12 12   ALT 22 16   ANIONGAP 4* 4*       Significant Imaging: I have reviewed all pertinent imaging results/findings within the past 24 hours.

## 2025-07-06 NOTE — CONSULTS
[facility])  Infectious Disease  Consult Note    Patient Name: Tiffanie Gao  MRN: [id number]  Admission Date: 7/3/2025  Hospital Length of Stay: 3 days  Attending Physician: Kwabena Love MD  Primary Care Provider: Meeks, Claude H., MD     Isolation Status: No active isolations    Patient information was obtained from patient, past medical records, and ER records.      Consults  Assessment/Plan:     Pulmonary  COPD (chronic obstructive pulmonary disease)  [facility] as a primary team    ID  Bacteremia due to Escherichia coli  Isolate is and ESBL strain.  We will continue  Ertapenem and will use meropenem in AM  .  She will need at least 2 weeks of treatment from negative blood culture.  Blood culture 0 7/0 3 ESBL E coli.  Blood culture 0 7/0 5 pending  Ertapenem is protein bound with her low albumin 1 dose of ertapenem might  non last up to 24 hours as expected          Thank you for your consult. I will follow-up with patient. Please contact us if you have any additional questions.    Jefferson Nguyen MD, [facility]  Infectious Disease  [facility])    Subjective:     Principal Problem: Sepsis    HPI: 50 y/o female with PMHx of COPD, asthma, liver disease, cholecystectomy, hysterectomy, partial liver resection, underwent PTC with drain placement in May,.  She was transferred from [place] with c/o fevers/chills, RUQ pain, and drainage worsening over past several weeks.   She had  PTC with drain placement, stent placed in bile duct 5/26; she noted discharge around tract site and completed course of antibiotics.  She was noted to have fever-T-max 102.5° on admit.  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   Blood culture 0703 showed ESBL E coli    Past Medical History:   Diagnosis Date    Asthma     COPD (chronic obstructive pulmonary disease)     Liver disease     Mental disorder         Past Surgical History:   Procedure Laterality Date    Bile duct reconstruction      CHOLECYSTECTOMY      HYSTERECTOMY      partial liver resection         Review of patient's allergies indicates:   Allergen Reactions    Sumatriptan Nausea And Vomiting     Other reaction(s): vomiting       Medications:  Medications Prior to Admission   Medication Sig    albuterol (PROVENTIL/VENTOLIN HFA) 90 mcg/actuation inhaler Inhale into the lungs.    albuterol-ipratropium (DUO-NEB) 2.5 mg-0.5 mg/3 mL nebulizer solution Inhale 3 mLs into the lungs every 6 (six) hours as needed.    alendronate (FOSAMAX) 70 MG tablet Take 70 mg by mouth every 7 days.    ALPRAZolam (XANAX) 0.5 MG tablet Take 0.5 mg by mouth daily as needed.    CAPLYTA 42 mg Cap Take 1 capsule by mouth Daily.    famotidine (PEPCID) 40 MG tablet Take 40 mg by mouth.    ondansetron (ZOFRAN-ODT) 4 MG TbDL Take 4 mg by mouth every 6 (six) hours as needed.    pantoprazole (PROTONIX) 40 MG tablet Take 40 mg by mouth every evening.    rOPINIRole (REQUIP) 2 MG tablet Take 2 mg by mouth 2 (two) times daily.    SYMBICORT 160-4.5 mcg/actuation HFAA Inhale 2 puffs into the lungs every 12 (twelve) hours.    traZODone (DESYREL) 100 MG tablet Take 100 mg by mouth every evening.     Antibiotics (From admission, onward)      Start     Stop Route Frequency Ordered    07/04/25 1730  ertapenem (INVANZ) 1 g in 0.9% NaCl 100 mL IVPB (MB+)         -- IV Every 24 hours (non-standard times) 07/04/25 1624          Antifungals (From admission, onward)      None          Antivirals (From admission, onward)      None               There is no immunization history on file for this patient.    Family History       Problem Relation (Age of Onset)    Cancer Maternal Grandmother    Diabetes Maternal Grandmother    Heart disease Paternal Grandmother, Maternal Grandmother    Hypertension Father, Mother          Social History     Socioeconomic History    Marital status:    Tobacco Use     Smoking status: Never    Smokeless tobacco: Never   Substance and Sexual Activity    Alcohol use: Not Currently    Drug use: Never    Sexual activity: Not Currently     Social Drivers of Health     Financial Resource Strain: Low Risk  (7/3/2025)    Overall Financial Resource Strain (CARDIA)     Difficulty of Paying Living Expenses: Not hard at all   Recent Concern: Financial Resource Strain - Medium Risk (5/9/2025)    Overall Financial Resource Strain (CARDIA)     Difficulty of Paying Living Expenses: Somewhat hard   Food Insecurity: Food Insecurity Present (7/3/2025)    Hunger Vital Sign     Worried About Running Out of Food in the Last Year: Sometimes true     Ran Out of Food in the Last Year: Sometimes true   Transportation Needs: No Transportation Needs (7/3/2025)    PRAPARE - Transportation     Lack of Transportation (Medical): No     Lack of Transportation (Non-Medical): No   Physical Activity: Inactive (5/9/2025)    Exercise Vital Sign     Days of Exercise per Week: 0 days     Minutes of Exercise per Session: 10 min   Stress: Stress Concern Present (7/3/2025)    [employer] - Occupational Stress Questionnaire     Feeling of Stress : Very much   Housing Stability: Low Risk  (7/3/2025)    Housing Stability Vital Sign     Unable to Pay for Housing in the Last Year: No     Number of Times Moved in the Last Year: 0     Homeless in the Last Year: No     Review of Systems   Constitutional:  Negative for activity change, appetite change, chills, diaphoresis and fatigue.   HENT:  Negative for congestion.    Neurological:  Negative for dizziness and facial asymmetry.     Objective:     Vital Signs (Most Recent):  Temp: 97.9 °F (36.6 °C) (07/06/25 0402)  Pulse: 72 (07/06/25 0500)  Resp: 18 (07/06/25 0402)  BP: (!) 95/59 (07/06/25 0402)  SpO2: 95 % (07/06/25 0402) Vital Signs (24h Range):  Temp:  [97.6 °F (36.4 °C)-98.2 °F (36.8 °C)] 97.9 °F (36.6 °C)  Pulse:  [59-90] 72  Resp:  [16-18]  "18  SpO2:  [95 %-100 %] 95 %  BP: ()/(59-74) 95/59     Weight: 44.2 kg (97 lb 7.1 oz)  Body mass index is 21.09 kg/m².    Estimated Creatinine Clearance: 77.4 mL/min (based on SCr of 0.6 mg/dL).     Physical Exam  Vitals and nursing note reviewed.   Constitutional:       Appearance: She is ill-appearing.   HENT:      Head: Normocephalic.   Eyes:      Pupils: Pupils are equal, round, and reactive to light.   Musculoskeletal:         General: Normal range of motion.   Skin:     Coloration: Skin is not jaundiced.      Findings: No bruising or lesion.          Significant Labs: Blood Culture: No results for input(s): "LABBLOO" in the last 4320 hours.  BMP:   Recent Labs   Lab 07/06/25 0455   GLU 81      K 3.6      CO2 31*   BUN 5*   CREATININE 0.6   CALCIUM 8.3*     C4 Count: No results for input(s): "C4" in the last 48 hours.  CBC:   Recent Labs   Lab 07/05/25 0428 07/06/25 0455   WBC 6.24 4.60   HGB 10.3* 10.2*   HCT 30.7* 30.3*    213     CMP:   Recent Labs   Lab 07/05/25 0428 07/06/25 0455    142   K 3.6 3.6    107   CO2 29 31*   GLU 91 81   BUN 5* 5*   CREATININE 0.6 0.6   CALCIUM 8.3* 8.3*   PROT 5.3* 5.1*   ALBUMIN 2.2* 2.2*   BILITOT 0.7 0.4   ALKPHOS 270* 256*   AST 12 12   ALT 22 16   ANIONGAP 4* 4*     Microbiology Results (last 7 days)       Procedure Component Value Units Date/Time    Blood culture [[id number]]  (Normal) Collected: 07/05/25 0428    Order Status: Completed Specimen: Blood from Peripheral, Antecubital, Left Updated: 07/05/25 2101     Blood Culture No Growth After 6 Hours    Blood culture [[id number]]  (Normal) Collected: 07/05/25 0428    Order Status: Completed Specimen: Blood from Peripheral, Antecubital, Right Updated: 07/05/25 2101     Blood Culture No Growth After 6 Hours    Blood culture (site 2) [[id number]]  (Abnormal) Collected: 07/03/25 0743    Order Status: Completed Specimen: Blood Updated: 07/05/25 1210     Blood Culture Escherichia coli "     GRAM STAIN Gram Negative Rods     Comment: Aerobic & Anaerobic Bottles Positive       Narrative:      For susceptibility see order number [id number]    Aerobic and Anaerobic Bottles Positive     Blood culture (site 1) [[id number]]  (Abnormal) Collected: 07/03/25 0743    Order Status: Completed Specimen: Blood Updated: 07/05/25 1210     Blood Culture Escherichia coli     Comment: Susceptibility pending        GRAM STAIN Gram Negative Rods     Comment: Aerobic Bottle Positive         Gram Negative Rods     Comment: Anaerobic Bottle Positive       Rapid Organism ID by PCR (from Blood culture) [[id number]]  (Abnormal) Collected: 07/03/25 0743    Order Status: Completed Specimen: Blood Updated: 07/04/25 1231     Enterococcus faecalis Not Detected     Enterococcus faecium Not Detected     Listeria monocytogenes Not Detected     Staphylococcus spp. Not Detected     Staphylococcus aureus Not Detected     Staphylococcus epidermidis Not Detected     Staphylococcus lugdunensis Not Detected     Streptococcus spp. Not Detected     Streptococcus agalactiae (Group B) Not Detected     Streptococcus pneumoniae Not Detected     Streptococcus pyogenes (Group A) Not Detected     Acinetobacter calcoaceticus/baumannii complex Not Detected     Bacteroides fragilis Not Detected     Enterobacterales See Species for ID     Enterobacter cloacae complex Not Detected     Escherichia coli Detected     Klebsiella aerogenes Not Detected     Klebsiella oxytoca Not Detected     Klebsiella pneumoniae group Not Detected     Proteus spp. Not Detected     Salmonella spp. Not Detected     Serratia marcescens Not Detected     Haemophilus influenzae Not Detected     Neisseria meningitidis Not Detected     Pseudomonas aeruginosa Not Detected     Stenotrophomonas maltophilia Not Detected     Candida albicans Not Detected     Candida auris Not Detected     Candida glabrata Not Detected     Candida krusei Not Detected     Candida parapsilosis Not  Detected     Candida tropicalis Not Detected     Cryptococcus neoformans/gattii Not Detected     CTX-M (ESBL ) Detected     Comment: Note: Antimicrobial resistance can occur via multiple mechanisms. A Not Detected result for antimicrobial resistance gene(s) does not indicate antimicrobial susceptibility. Subculturing is required for species identification and susceptibility testing of   isolates.        IMP (Cabapenemase ) Not Detected     Comment: Note: Antimicrobial resistance can occur via multiple mechanisms. A Not Detected result for antimicrobial resistance gene(s) does not indicate antimicrobial susceptibility. Subculturing is required for species identification and susceptibility testing of   isolates.        KPC resistance gene (Carbapenemase ) Not Detected     Comment: Note: Antimicrobial resistance can occur via multiple mechanisms. A Not Detected result for antimicrobial resistance gene(s) does not indicate antimicrobial susceptibility. Subculturing is required for species identification and susceptibility testing of   isolates.        mcr-1 Not Detected     Comment: Note: Antimicrobial resistance can occur via multiple mechanisms. A Not Detected result for antimicrobial resistance gene(s) does not indicate antimicrobial susceptibility. Subculturing is required for species identification and susceptibility testing of   isolates.        mecA ID N/A     Comment: Note: Antimicrobial resistance can occur via multiple mechanisms. A Not Detected result for antimicrobial resistance gene(s) does not indicate antimicrobial susceptibility. Subculturing is required for species identification and susceptibility testing of   isolates.        mecA/C and MREJ (MRSA) gene N/A     Comment: Note: Antimicrobial resistance can occur via multiple mechanisms. A Not Detected result for antimicrobial resistance gene(s) does not indicate antimicrobial susceptibility. Subculturing is required for species  identification and susceptibility testing of   isolates.        NDM (Carbapenemase ) Not Detected     Comment: Note: Antimicrobial resistance can occur via multiple mechanisms. A Not Detected result for antimicrobial resistance gene(s) does not indicate antimicrobial susceptibility. Subculturing is required for species identification and susceptibility testing of   isolates.        OXA-48-like (Carbapenemase ) Not Detected     Comment: Note: Antimicrobial resistance can occur via multiple mechanisms. A Not Detected result for antimicrobial resistance gene(s) does not indicate antimicrobial susceptibility. Subculturing is required for species identification and susceptibility testing of   isolates.        Pamela/B (VRE gene) N/A     Comment: Note: Antimicrobial resistance can occur via multiple mechanisms. A Not Detected result for antimicrobial resistance gene(s) does not indicate antimicrobial susceptibility. Subculturing is required for species identification and susceptibility testing of   isolates.        VIM (Carbapenemase ) Not Detected     Comment: Note: Antimicrobial resistance can occur via multiple mechanisms. A Not Detected result for antimicrobial resistance gene(s) does not indicate antimicrobial susceptibility. Subculturing is required for species identification and susceptibility testing of   isolates.       Rapid Organism ID by PCR (from Blood culture) [[id number]]  (Abnormal) Collected: 07/03/25 0743    Order Status: Completed Specimen: Blood Updated: 07/04/25 0726     Enterococcus faecalis Not Detected     Enterococcus faecium Not Detected     Listeria monocytogenes Not Detected     Staphylococcus spp. Not Detected     Staphylococcus aureus Not Detected     Staphylococcus epidermidis Not Detected     Staphylococcus lugdunensis Not Detected     Streptococcus spp. Not Detected     Streptococcus agalactiae (Group B) Not Detected     Streptococcus pneumoniae Not Detected      Streptococcus pyogenes (Group A) Not Detected     Acinetobacter calcoaceticus/baumannii complex Not Detected     Bacteroides fragilis Not Detected     Enterobacterales See Species for ID     Enterobacter cloacae complex Not Detected     Escherichia coli Detected     Klebsiella aerogenes Not Detected     Klebsiella oxytoca Not Detected     Klebsiella pneumoniae group Not Detected     Proteus spp. Not Detected     Salmonella spp. Not Detected     Serratia marcescens Not Detected     Haemophilus influenzae Not Detected     Neisseria meningitidis Not Detected     Pseudomonas aeruginosa Not Detected     Stenotrophomonas maltophilia Not Detected     Candida albicans Not Detected     Candida auris Not Detected     Candida glabrata Not Detected     Candida krusei Not Detected     Candida parapsilosis Not Detected     Candida tropicalis Not Detected     Cryptococcus neoformans/gattii Not Detected     CTX-M (ESBL ) Detected     Comment: Note: Antimicrobial resistance can occur via multiple mechanisms. A Not Detected result for antimicrobial resistance gene(s) does not indicate antimicrobial susceptibility. Subculturing is required for species identification and susceptibility testing of   isolates.        IMP (Cabapenemase ) Not Detected     Comment: Note: Antimicrobial resistance can occur via multiple mechanisms. A Not Detected result for antimicrobial resistance gene(s) does not indicate antimicrobial susceptibility. Subculturing is required for species identification and susceptibility testing of   isolates.        KPC resistance gene (Carbapenemase ) Not Detected     Comment: Note: Antimicrobial resistance can occur via multiple mechanisms. A Not Detected result for antimicrobial resistance gene(s) does not indicate antimicrobial susceptibility. Subculturing is required for species identification and susceptibility testing of   isolates.        mcr-1 Not Detected     Comment: Note: Antimicrobial  resistance can occur via multiple mechanisms. A Not Detected result for antimicrobial resistance gene(s) does not indicate antimicrobial susceptibility. Subculturing is required for species identification and susceptibility testing of   isolates.        mecA ID N/A     Comment: Note: Antimicrobial resistance can occur via multiple mechanisms. A Not Detected result for antimicrobial resistance gene(s) does not indicate antimicrobial susceptibility. Subculturing is required for species identification and susceptibility testing of   isolates.        mecA/C and MREJ (MRSA) gene N/A     Comment: Note: Antimicrobial resistance can occur via multiple mechanisms. A Not Detected result for antimicrobial resistance gene(s) does not indicate antimicrobial susceptibility. Subculturing is required for species identification and susceptibility testing of   isolates.        NDM (Carbapenemase ) Not Detected     Comment: Note: Antimicrobial resistance can occur via multiple mechanisms. A Not Detected result for antimicrobial resistance gene(s) does not indicate antimicrobial susceptibility. Subculturing is required for species identification and susceptibility testing of   isolates.        OXA-48-like (Carbapenemase ) Not Detected     Comment: Note: Antimicrobial resistance can occur via multiple mechanisms. A Not Detected result for antimicrobial resistance gene(s) does not indicate antimicrobial susceptibility. Subculturing is required for species identification and susceptibility testing of   isolates.        Pamela/B (VRE gene) N/A     Comment: Note: Antimicrobial resistance can occur via multiple mechanisms. A Not Detected result for antimicrobial resistance gene(s) does not indicate antimicrobial susceptibility. Subculturing is required for species identification and susceptibility testing of   isolates.        VIM (Carbapenemase ) Not Detected     Comment: Note: Antimicrobial resistance can occur via  multiple mechanisms. A Not Detected result for antimicrobial resistance gene(s) does not indicate antimicrobial susceptibility. Subculturing is required for species identification and susceptibility testing of   isolates.               All pertinent labs within the past 24 hours have been reviewed.    Significant Imaging: I have reviewed all pertinent imaging results/findings within the past 24 hours.

## 2025-07-06 NOTE — ASSESSMENT & PLAN NOTE
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   -IR performed cholangiogram with findings of a focal stenosis however contrast was flowing easily through that region.  See imaging findings for complete details.    -Patient reports plans for removal on 7/7/25 in [place].  Capping trial initiated on 07/05, patient doing well without any pain.  We will have IR remove drain on 07/07 if possible.  -Multimodal pain control requiring IV and PO narcotics

## 2025-07-06 NOTE — ASSESSMENT & PLAN NOTE
Isolate is and ESBL strain.  We will continue  Ertapenem and will use meropenem in AM  .  She will need at least 2 weeks of treatment from negative blood culture.  Blood culture 0 7/0 3 ESBL E coli.  Blood culture 0 7/0 5 pending  Ertapenem is protein bound with her low albumin 1 dose of ertapenem might  non last up to 24 hours as expected

## 2025-07-06 NOTE — ASSESSMENT & PLAN NOTE
-started on ertapenem 7/4, swab to meropenem by ID.  Likely will need at least 14 days IV antibiotics.   -repeat cultures drawn on 7/5, no growth thus far.  -tolerating oral intake, no pain.    Patient has sepsis without organ dysfunction secondary to Intra-abdominal Infection. A review of systems was completed. Patient's sepsis is worsening. Will continue current treatment    Current Antibiotics    meropenem 2 g in 0.9% NaCl 100 mL IVPB (MB+), Every 8 hours (non-standard times), Intravenous    Lactate  Recent Labs   Lab 07/03/25  0743   LACTATE 1.0     Culture Data  Blood Cultures   Blood Culture   Date Value Ref Range Status   07/05/2025 No Growth After 6 Hours  Preliminary   07/05/2025 No Growth After 6 Hours  Preliminary   07/03/2025 Escherichia coli ESBL (A)  Final   07/03/2025 Escherichia coli ESBL (A)  Final   05/30/2025 No Growth After 5 Days  Final   05/30/2025 No Growth After 5 Days  Final      mSOFA  MSOFA Total  Min: 0   Min taken time: 07/06/25 1201  Max: 1   Max taken time: 07/06/25 0801

## 2025-07-06 NOTE — PROGRESS NOTES
[facility] Medicine  Progress Note    Patient Name: Tiffanie Gao  MRN: [id number]  Patient Class: IP- Inpatient   Admission Date: 7/3/2025  Length of Stay: 3 days  Attending Physician: Kwabena Love MD  Primary Care Provider: Meeks, Claude H., MD        Subjective     Principal Problem:Sepsis        HPI:  50 y/o female with PMHx of COPD, asthma, liver disease, cholecystectomy, hysterectomy, partial liver resection, underwent PTC with drain placement in May, who presents as a transfer from [place] with c/o fevers/chills, RUQ pain, and drainage worsening over past several weeks. Chart reviewed via paper records sent by transferring facility. Patient s/p PTC with drain placement, stent placed in bile duct 5/26; she noted discharge around tract site and completed course of antibiotics. Drainage returned past several days. Her indwelling biliary drain was shortened by her surgeon yesterday.     Patient met sepsis criteria on arrival; Temp 102.5, , RR 20, /70, 94% on room air. SBP noted to drop to 80-90's.  WBC 7.06k with 93.2 neutrophils shift. Lactic acid 2.2 --> 0.6 after IV fluids. CXR with no acute findings. UA negative. Covid/Flu negative. 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 Reve iced 2L NS (> 30cc/kg bolus), hypotension resolved, received Zosyn. Admit inpatient for further management.      Overview/Hospital Course:  Patient presented with fever 102.5, tachycardic, right upper quadrant abdominal pain, fevers with an existing biliary drain following PTC in May.  Underwent cholangiogram by IR on 7/3, which showed contrast flowing freely through the stenosis, however drain kept in due to her symptoms.  Found to have ESBL E coli bacteremia, started on ertapenem on 7/4, subsequently swapped to meropenem by ID.  Repeat cultures drawn on 07/05.  Capping trials started on 07/05, patient had improvement in her  pain, tolerated diet diet.  Spoke with IR about removal of drain which was originally scheduled in [place] on 07/07.  However we will have IR remove drain at our facility.  Case management consulted for antibiotic arrangements.    Interval History:  patient doing well this morning.  When drain was capped, she did feel better, tolerating her diet without any nausea, vomiting, pain in the abdomen.    Review of Systems   All other systems reviewed and are negative.    Objective:     Vital Signs (Most Recent):  Temp: 97.5 °F (36.4 °C) (07/06/25 1224)  Pulse: 77 (07/06/25 1224)  Resp: 18 (07/06/25 1224)  BP: 128/71 (07/06/25 1224)  SpO2: 100 % (07/06/25 1224) Vital Signs (24h Range):  Temp:  [97.5 °F (36.4 °C)-97.9 °F (36.6 °C)] 97.5 °F (36.4 °C)  Pulse:  [59-92] 77  Resp:  [16-18] 18  SpO2:  [95 %-100 %] 100 %  BP: ()/(58-74) 128/71     Weight: 44.2 kg (97 lb 7.1 oz)  Body mass index is 21.09 kg/m².    Intake/Output Summary (Last 24 hours) at 7/6/2025 1237  Last data filed at 7/6/2025 0402  Gross per 24 hour   Intake 192.23 ml   Output 1100 ml   Net -907.77 ml         Physical Exam  Vitals and nursing note reviewed.   Constitutional:       General: She is not in acute distress.     Comments: Thin elderly woman, comfortable   HENT:      Head: Normocephalic.   Eyes:      Extraocular Movements: Extraocular movements intact.      Pupils: Pupils are equal, round, and reactive to light.   Cardiovascular:      Rate and Rhythm: Normal rate.      Heart sounds: No murmur heard.  Pulmonary:      Effort: Pulmonary effort is normal. No respiratory distress.      Breath sounds: No wheezing.   Abdominal:      General: There is no distension.      Tenderness: There is no abdominal tenderness.      Comments: Biliary drain capped, no tenderness in the abdomen.   Musculoskeletal:         General: No swelling or tenderness. Normal range of motion.   Skin:     General: Skin is warm and dry.      Coloration: Skin is not jaundiced.       Findings: No bruising or lesion.   Neurological:      General: No focal deficit present.      Mental Status: She is alert and oriented to person, place, and time.      Cranial Nerves: No cranial nerve deficit.   Psychiatric:         Mood and Affect: Mood normal.         Behavior: Behavior normal.               Significant Labs: All pertinent labs within the past 24 hours have been reviewed.  CBC:   Recent Labs   Lab 07/05/25 0428 07/06/25 0455   WBC 6.24 4.60   HGB 10.3* 10.2*   HCT 30.7* 30.3*    213     CMP:   Recent Labs   Lab 07/05/25 0428 07/06/25 0455    142   K 3.6 3.6    107   CO2 29 31*   GLU 91 81   BUN 5* 5*   CREATININE 0.6 0.6   CALCIUM 8.3* 8.3*   PROT 5.3* 5.1*   ALBUMIN 2.2* 2.2*   BILITOT 0.7 0.4   ALKPHOS 270* 256*   AST 12 12   ALT 22 16   ANIONGAP 4* 4*       Significant Imaging: I have reviewed all pertinent imaging results/findings within the past 24 hours.      Assessment & Plan  Sepsis  Bacteremia due to Escherichia coli  Infection due to ESBL-producing Escherichia coli  -started on ertapenem 7/4, swab to meropenem by ID.  Likely will need at least 14 days IV antibiotics.   -repeat cultures drawn on 7/5, no growth thus far.  -tolerating oral intake, no pain.    Patient has sepsis without organ dysfunction secondary to Intra-abdominal Infection. A review of systems was completed. Patient's sepsis is worsening. Will continue current treatment    Current Antibiotics    meropenem 2 g in 0.9% NaCl 100 mL IVPB (MB+), Every 8 hours (non-standard times), Intravenous    Lactate  Recent Labs   Lab 07/03/25  0743   LACTATE 1.0     Culture Data  Blood Cultures   Blood Culture   Date Value Ref Range Status   07/05/2025 No Growth After 6 Hours  Preliminary   07/05/2025 No Growth After 6 Hours  Preliminary   07/03/2025 Escherichia coli ESBL (A)  Final   07/03/2025 Escherichia coli ESBL (A)  Final   05/30/2025 No Growth After 5 Days  Final   05/30/2025 No Growth After 5 Days   Final      mSOFA  MSOFA Total  Min: 0   Min taken time: 07/06/25 1201  Max: 1   Max taken time: 07/06/25 0801      Biliary drain displacement  -CT A/P revealed 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   -IR performed cholangiogram with findings of a focal stenosis however contrast was flowing easily through that region.  See imaging findings for complete details.    -Patient reports plans for removal on 7/7/25 in [place].  Capping trial initiated on 07/05, patient doing well without any pain.  We will have IR remove drain on 07/07 if possible.  -Multimodal pain control requiring IV and PO narcotics    COPD (chronic obstructive pulmonary disease)  Patient's COPD is controlled currently.  Patient is currently off COPD Pathway. Continue scheduled inhalers Antibiotics and monitor respiratory status closely.   GERD (gastroesophageal reflux disease)  Resume home medications  On famotidine and protonix    Anxiety  Chronic, stable  Resume home medications  On Xanax, [place]PD[place] reviewed    VTE Risk Mitigation (From admission, onward)           Ordered     IP VTE HIGH RISK PATIENT  Once         07/03/25 0720     Place sequential compression device  Until discontinued         07/03/25 0720                    Discharge Planning   ALLAN: 7/7/2025     Code Status: Full Code   Medical Readiness for Discharge Date:   Discharge Plan A: Home                        Kwabena Love MD  Department of Hospital Medicine   [facility] - Telemetry ([facility])

## 2025-07-06 NOTE — HOSPITAL COURSE
Patient presented with fever 102.5, tachycardic, right upper quadrant abdominal pain, fevers with an existing biliary drain following PTC in May.  Underwent cholangiogram by IR on 7/3, which showed contrast flowing freely through the stenosis, however drain kept in due to her symptoms.  Found to have ESBL E coli bacteremia, started on ertapenem on 7/4, subsequently swapped to meropenem by ID.  Repeat cultures drawn on 07/05.  Capping trials started on 07/05, patient had improvement in her pain, tolerated diet diet.  Spoke with IR about removal of drain which was originally scheduled in [place] on 07/07.  However we will have IR remove drain at our facility.  Case management consulted for antibiotic arrangements.    7/7/25  AZEEM, drain removed this afternoon  Patient hesitant for home IV antibiotics, discussed necessary to treat at this time  Discussed with Dr. Nguyen, plans for IV antibiotics  Plans for Merrem x 2 weeks    7/8/25  NAEON, patient doing well, denies complaints  Drain removed today by IR  Discussed with Dr. Nguyen, patient refusing PICC line placement  Plan for ertapenem 500 mg IM BID to complete course  Stable for discharge home  F/U with PCP in 1-2 weeks for further management

## 2025-07-07 LAB
ABSOLUTE EOSINOPHIL (OHS): 0.11 K/UL
ABSOLUTE MONOCYTE (OHS): 0.37 K/UL (ref 0.3–1)
ABSOLUTE NEUTROPHIL COUNT (OHS): 2.81 K/UL (ref 1.8–7.7)
BASOPHILS # BLD AUTO: 0.03 K/UL
BASOPHILS NFR BLD AUTO: 0.6 %
ERYTHROCYTE [DISTWIDTH] IN BLOOD BY AUTOMATED COUNT: 13.4 % (ref 11.5–14.5)
HCT VFR BLD AUTO: 33 % (ref 37–48.5)
HGB BLD-MCNC: 11 GM/DL (ref 12–16)
IMM GRANULOCYTES # BLD AUTO: 0.02 K/UL (ref 0–0.04)
IMM GRANULOCYTES NFR BLD AUTO: 0.4 % (ref 0–0.5)
LYMPHOCYTES # BLD AUTO: 1.47 K/UL (ref 1–4.8)
MCH RBC QN AUTO: 29.2 PG (ref 27–31)
MCHC RBC AUTO-ENTMCNC: 33.3 G/DL (ref 32–36)
MCV RBC AUTO: 88 FL (ref 82–98)
NUCLEATED RBC (/100WBC) (OHS): 0 /100 WBC
PLATELET # BLD AUTO: 261 K/UL (ref 150–450)
PMV BLD AUTO: 9.9 FL (ref 9.2–12.9)
RBC # BLD AUTO: 3.77 M/UL (ref 4–5.4)
RELATIVE EOSINOPHIL (OHS): 2.3 %
RELATIVE LYMPHOCYTE (OHS): 30.6 % (ref 18–48)
RELATIVE MONOCYTE (OHS): 7.7 % (ref 4–15)
RELATIVE NEUTROPHIL (OHS): 58.4 % (ref 38–73)
WBC # BLD AUTO: 4.81 K/UL (ref 3.9–12.7)

## 2025-07-07 PROCEDURE — 27000207 HC ISOLATION

## 2025-07-07 PROCEDURE — 94640 AIRWAY INHALATION TREATMENT: CPT

## 2025-07-07 PROCEDURE — 94761 N-INVAS EAR/PLS OXIMETRY MLT: CPT

## 2025-07-07 PROCEDURE — 25000003 PHARM REV CODE 250: Performed by: INTERNAL MEDICINE

## 2025-07-07 PROCEDURE — 36415 COLL VENOUS BLD VENIPUNCTURE: CPT | Performed by: STUDENT IN AN ORGANIZED HEALTH CARE EDUCATION/TRAINING PROGRAM

## 2025-07-07 PROCEDURE — 85025 COMPLETE CBC W/AUTO DIFF WBC: CPT | Performed by: STUDENT IN AN ORGANIZED HEALTH CARE EDUCATION/TRAINING PROGRAM

## 2025-07-07 PROCEDURE — 63600175 PHARM REV CODE 636 W HCPCS: Performed by: INTERNAL MEDICINE

## 2025-07-07 PROCEDURE — 21400001 HC TELEMETRY ROOM

## 2025-07-07 PROCEDURE — 0FPBX0Z REMOVAL OF DRAINAGE DEVICE FROM HEPATOBILIARY DUCT, EXTERNAL APPROACH: ICD-10-PCS | Performed by: RADIOLOGY

## 2025-07-07 PROCEDURE — 25000242 PHARM REV CODE 250 ALT 637 W/ HCPCS: Performed by: HOSPITALIST

## 2025-07-07 PROCEDURE — 25000003 PHARM REV CODE 250: Performed by: HOSPITALIST

## 2025-07-07 PROCEDURE — 99232 SBSQ HOSP IP/OBS MODERATE 35: CPT | Mod: NSCH,,, | Performed by: INTERNAL MEDICINE

## 2025-07-07 RX ADMIN — FAMOTIDINE 20 MG: 20 TABLET, FILM COATED ORAL at 08:07

## 2025-07-07 RX ADMIN — ROPINIROLE HYDROCHLORIDE 2 MG: 1 TABLET, FILM COATED ORAL at 08:07

## 2025-07-07 RX ADMIN — MEROPENEM 2 G: 2 INJECTION, POWDER, FOR SOLUTION INTRAVENOUS at 08:07

## 2025-07-07 RX ADMIN — BUDESONIDE INHALATION 0.5 MG: 0.5 SUSPENSION RESPIRATORY (INHALATION) at 08:07

## 2025-07-07 RX ADMIN — ARFORMOTEROL TARTRATE 15 MCG: 15 SOLUTION RESPIRATORY (INHALATION) at 08:07

## 2025-07-07 RX ADMIN — OXYCODONE HYDROCHLORIDE 5 MG: 5 TABLET ORAL at 04:07

## 2025-07-07 RX ADMIN — ALUMINA, MAGNESIA, AND SIMETHICONE ORAL SUSPENSION REGULAR STRENGTH 30 ML: 1200; 1200; 120 SUSPENSION ORAL at 11:07

## 2025-07-07 RX ADMIN — MEROPENEM 2 G: 2 INJECTION, POWDER, FOR SOLUTION INTRAVENOUS at 01:07

## 2025-07-07 RX ADMIN — TRAZODONE HYDROCHLORIDE 100 MG: 100 TABLET ORAL at 08:07

## 2025-07-07 RX ADMIN — SUCRALFATE 1 G: 1 SUSPENSION ORAL at 06:07

## 2025-07-07 RX ADMIN — PANTOPRAZOLE SODIUM 40 MG: 40 TABLET, DELAYED RELEASE ORAL at 08:07

## 2025-07-07 RX ADMIN — MEROPENEM 2 G: 2 INJECTION, POWDER, FOR SOLUTION INTRAVENOUS at 04:07

## 2025-07-07 RX ADMIN — SUCRALFATE 1 G: 1 SUSPENSION ORAL at 12:07

## 2025-07-07 RX ADMIN — ARFORMOTEROL TARTRATE 15 MCG: 15 SOLUTION RESPIRATORY (INHALATION) at 07:07

## 2025-07-07 RX ADMIN — BUDESONIDE INHALATION 0.5 MG: 0.5 SUSPENSION RESPIRATORY (INHALATION) at 07:07

## 2025-07-07 NOTE — CONSULTS
Chart reviewed by Dr. Toledo.       ASSESSMENT/PLAN:    Biliary drain removal    The order for a IR drain removal has been placed and the procedure will be performed asap.          Thank you for the consult.

## 2025-07-07 NOTE — PLAN OF CARE
Problem: Adult Inpatient Plan of Care  Goal: Plan of Care Review  Outcome: Progressing  Goal: Patient-Specific Goal (Individualized)  Outcome: Progressing  Goal: Absence of Hospital-Acquired Illness or Injury  Outcome: Progressing  Goal: Optimal Comfort and Wellbeing  Outcome: Progressing  Goal: Readiness for Transition of Care  Outcome: Progressing     Problem: Sepsis/Septic Shock  Goal: Optimal Coping  Outcome: Progressing  Goal: Absence of Bleeding  Outcome: Progressing  Goal: Blood Glucose Level Within Targeted Range  Outcome: Progressing  Goal: Absence of Infection Signs and Symptoms  Outcome: Progressing  Goal: Optimal Nutrition Intake  Outcome: Progressing     Problem: Fall Injury Risk  Goal: Absence of Fall and Fall-Related Injury  Outcome: Progressing     Problem: Pain Acute  Goal: Optimal Pain Control and Function  Outcome: Progressing     Problem: Activity Intolerance  Goal: Enhanced Capacity and Energy  Outcome: Progressing

## 2025-07-07 NOTE — PLAN OF CARE
07/07/25 1528   Rounds   Attendance Provider;Charge nurse;   Discharge Plan A Home with family;Home Health   Why the patient remains in the hospital Requires continued medical care   Transition of Care Barriers None     Patient remains hospitalized due to continued medical care. Discharge disposition planned for home; home with family. SW consult placed for home health. Pt pending cultures and ID recs. SW to coordinate DC services once final recs received.

## 2025-07-08 VITALS
BODY MASS INDEX: 20.27 KG/M2 | WEIGHT: 93.94 LBS | HEART RATE: 79 BPM | RESPIRATION RATE: 18 BRPM | OXYGEN SATURATION: 98 % | HEIGHT: 57 IN | TEMPERATURE: 98 F | SYSTOLIC BLOOD PRESSURE: 123 MMHG | DIASTOLIC BLOOD PRESSURE: 75 MMHG

## 2025-07-08 PROBLEM — A41.9 SEPSIS: Status: RESOLVED | Noted: 2025-07-03 | Resolved: 2025-07-08

## 2025-07-08 LAB
ABSOLUTE EOSINOPHIL (OHS): 0.21 K/UL
ABSOLUTE MONOCYTE (OHS): 0.52 K/UL (ref 0.3–1)
ABSOLUTE NEUTROPHIL COUNT (OHS): 3.15 K/UL (ref 1.8–7.7)
BASOPHILS # BLD AUTO: 0.04 K/UL
BASOPHILS NFR BLD AUTO: 0.8 %
ERYTHROCYTE [DISTWIDTH] IN BLOOD BY AUTOMATED COUNT: 13.2 % (ref 11.5–14.5)
HCT VFR BLD AUTO: 34.1 % (ref 37–48.5)
HGB BLD-MCNC: 11.1 GM/DL (ref 12–16)
HOLD SPECIMEN: NORMAL
IMM GRANULOCYTES # BLD AUTO: 0.07 K/UL (ref 0–0.04)
IMM GRANULOCYTES NFR BLD AUTO: 1.3 % (ref 0–0.5)
LYMPHOCYTES # BLD AUTO: 1.23 K/UL (ref 1–4.8)
MCH RBC QN AUTO: 28.6 PG (ref 27–31)
MCHC RBC AUTO-ENTMCNC: 32.6 G/DL (ref 32–36)
MCV RBC AUTO: 88 FL (ref 82–98)
NUCLEATED RBC (/100WBC) (OHS): 0 /100 WBC
PLATELET # BLD AUTO: 293 K/UL (ref 150–450)
PMV BLD AUTO: 10 FL (ref 9.2–12.9)
RBC # BLD AUTO: 3.88 M/UL (ref 4–5.4)
RELATIVE EOSINOPHIL (OHS): 4 %
RELATIVE LYMPHOCYTE (OHS): 23.6 % (ref 18–48)
RELATIVE MONOCYTE (OHS): 10 % (ref 4–15)
RELATIVE NEUTROPHIL (OHS): 60.3 % (ref 38–73)
WBC # BLD AUTO: 5.22 K/UL (ref 3.9–12.7)

## 2025-07-08 PROCEDURE — 36415 COLL VENOUS BLD VENIPUNCTURE: CPT | Performed by: STUDENT IN AN ORGANIZED HEALTH CARE EDUCATION/TRAINING PROGRAM

## 2025-07-08 PROCEDURE — 94640 AIRWAY INHALATION TREATMENT: CPT

## 2025-07-08 PROCEDURE — 85025 COMPLETE CBC W/AUTO DIFF WBC: CPT | Performed by: STUDENT IN AN ORGANIZED HEALTH CARE EDUCATION/TRAINING PROGRAM

## 2025-07-08 PROCEDURE — 25000003 PHARM REV CODE 250: Performed by: HOSPITALIST

## 2025-07-08 PROCEDURE — 63600175 PHARM REV CODE 636 W HCPCS: Performed by: INTERNAL MEDICINE

## 2025-07-08 PROCEDURE — 94761 N-INVAS EAR/PLS OXIMETRY MLT: CPT

## 2025-07-08 PROCEDURE — 25000003 PHARM REV CODE 250: Performed by: INTERNAL MEDICINE

## 2025-07-08 PROCEDURE — 25000242 PHARM REV CODE 250 ALT 637 W/ HCPCS: Performed by: HOSPITALIST

## 2025-07-08 PROCEDURE — 63600175 PHARM REV CODE 636 W HCPCS: Performed by: HOSPITALIST

## 2025-07-08 PROCEDURE — 99232 SBSQ HOSP IP/OBS MODERATE 35: CPT | Mod: NSCH,,, | Performed by: INTERNAL MEDICINE

## 2025-07-08 RX ORDER — ONDANSETRON 4 MG/1
4 TABLET, ORALLY DISINTEGRATING ORAL EVERY 6 HOURS PRN
Qty: 30 TABLET | Refills: 0 | Status: SHIPPED | OUTPATIENT
Start: 2025-07-08

## 2025-07-08 RX ORDER — OXYCODONE HYDROCHLORIDE 5 MG/1
5 TABLET ORAL EVERY 8 HOURS PRN
Qty: 15 TABLET | Refills: 0 | Status: SHIPPED | OUTPATIENT
Start: 2025-07-08

## 2025-07-08 RX ADMIN — OXYCODONE HYDROCHLORIDE 5 MG: 5 TABLET ORAL at 09:07

## 2025-07-08 RX ADMIN — OXYCODONE HYDROCHLORIDE 5 MG: 5 TABLET ORAL at 04:07

## 2025-07-08 RX ADMIN — BUDESONIDE INHALATION 0.5 MG: 0.5 SUSPENSION RESPIRATORY (INHALATION) at 08:07

## 2025-07-08 RX ADMIN — ARFORMOTEROL TARTRATE 15 MCG: 15 SOLUTION RESPIRATORY (INHALATION) at 08:07

## 2025-07-08 RX ADMIN — ERTAPENEM 1 G: 1 INJECTION INTRAMUSCULAR; INTRAVENOUS at 04:07

## 2025-07-08 RX ADMIN — ROPINIROLE HYDROCHLORIDE 2 MG: 1 TABLET, FILM COATED ORAL at 09:07

## 2025-07-08 RX ADMIN — FAMOTIDINE 20 MG: 20 TABLET, FILM COATED ORAL at 09:07

## 2025-07-08 RX ADMIN — MEROPENEM 2 G: 2 INJECTION, POWDER, FOR SOLUTION INTRAVENOUS at 12:07

## 2025-07-08 RX ADMIN — MEROPENEM 2 G: 2 INJECTION, POWDER, FOR SOLUTION INTRAVENOUS at 09:07

## 2025-07-08 NOTE — PROGRESS NOTES
[facility] Medicine  Progress Note    Patient Name: Tiffanie Gao  MRN: [id number]  Patient Class: IP- Inpatient   Admission Date: 7/3/2025  Length of Stay: 5 days  Attending Physician: Andrea Sheldon MD  Primary Care Provider: Meeks, Claude H., MD        Subjective     Principal Problem:Sepsis        HPI:  48 y/o female with PMHx of COPD, asthma, liver disease, cholecystectomy, hysterectomy, partial liver resection, underwent PTC with drain placement in May, who presents as a transfer from [place] with c/o fevers/chills, RUQ pain, and drainage worsening over past several weeks. Chart reviewed via paper records sent by transferring facility. Patient s/p PTC with drain placement, stent placed in bile duct 5/26; she noted discharge around tract site and completed course of antibiotics. Drainage returned past several days. Her indwelling biliary drain was shortened by her surgeon yesterday.     Patient met sepsis criteria on arrival; Temp 102.5, , RR 20, /70, 94% on room air. SBP noted to drop to 80-90's.  WBC 7.06k with 93.2 neutrophils shift. Lactic acid 2.2 --> 0.6 after IV fluids. CXR with no acute findings. UA negative. Covid/Flu negative. 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 Reve iced 2L NS (> 30cc/kg bolus), hypotension resolved, received Zosyn. Admit inpatient for further management.      Overview/Hospital Course:  Patient presented with fever 102.5, tachycardic, right upper quadrant abdominal pain, fevers with an existing biliary drain following PTC in May.  Underwent cholangiogram by IR on 7/3, which showed contrast flowing freely through the stenosis, however drain kept in due to her symptoms.  Found to have ESBL E coli bacteremia, started on ertapenem on 7/4, subsequently swapped to meropenem by ID.  Repeat cultures drawn on 07/05.  Capping trials started on 07/05, patient had improvement in her  pain, tolerated diet diet.  Spoke with IR about removal of drain which was originally scheduled in [place] on 07/07.  However we will have IR remove drain at our facility.  Case management consulted for antibiotic arrangements.    7/7/25  AZEEM, drain removed this afternoon  Patient hesitant for home IV antibiotics, discussed necessary to treat at this time  Discussed with Dr. Nguyen, plans for IV antibiotics  Plans for Merrem x 2 weeks          Review of Systems   All other systems reviewed and are negative.    Objective:     Vital Signs (Most Recent):  Temp: 98.1 °F (36.7 °C) (07/08/25 0738)  Pulse: 83 (07/08/25 0818)  Resp: 16 (07/08/25 0944)  BP: 106/75 (07/08/25 0738)  SpO2: 99 % (07/08/25 0818) Vital Signs (24h Range):  Temp:  [97.5 °F (36.4 °C)-98.7 °F (37.1 °C)] 98.1 °F (36.7 °C)  Pulse:  [66-90] 83  Resp:  [16-20] 16  SpO2:  [96 %-99 %] 99 %  BP: ()/(53-83) 106/75     Weight: 42.6 kg (93 lb 14.7 oz)  Body mass index is 20.32 kg/m².    Intake/Output Summary (Last 24 hours) at 7/8/2025 1001  Last data filed at 7/7/2025 1927  Gross per 24 hour   Intake 428.18 ml   Output --   Net 428.18 ml         Physical Exam  Vitals and nursing note reviewed.   Constitutional:       General: She is not in acute distress.     Appearance: Normal appearance. She is normal weight.   Cardiovascular:      Rate and Rhythm: Normal rate and regular rhythm.      Heart sounds: No murmur heard.  Pulmonary:      Effort: Pulmonary effort is normal. No respiratory distress.      Breath sounds: No wheezing.   Neurological:      General: No focal deficit present.      Mental Status: She is alert and oriented to person, place, and time.   Psychiatric:         Mood and Affect: Mood normal.         Behavior: Behavior normal.               Significant Labs: All pertinent labs within the past 24 hours have been reviewed.  Recent Lab Results         07/08/25  0430        Baso # 0.04       Basophil % 0.8       Eos # 0.21       Eos % 4.0        Gran # (ANC) 3.15       Hematocrit 34.1       Hemoglobin 11.1       Extra Tube Hold for add-ons.  Comment: Auto resulted.          Immature Grans (Abs) 0.07  Comment: Mild elevation in immature granulocytes is non specific and can be seen in a variety of conditions including stress response, acute inflammation, trauma and pregnancy. Correlation with other laboratory and clinical findings is essential.       Immature Granulocytes 1.3       Lymph # 1.23       Lymph % 23.6       MCH 28.6       MCHC 32.6       MCV 88       Mono # 0.52       Mono % 10.0       MPV 10.0       Neut % 60.3       nRBC 0       Platelet Count 293       RBC 3.88       RDW 13.2       WBC 5.22               Significant Imaging: I have reviewed all pertinent imaging results/findings within the past 24 hours.    IR Biliary Catheter Removal with Imaging   Final Result   FINDINGS/   Tube was prepped and draped under sterile conditions.  Existing 10 [place] external biliary tube was injected which demonstrate patency of the ducts without evidence of obstruction.  The tube was cut and removed over a wire without difficulty.  Sterile dressing was applied.         Electronically signed by: Cheng Toledo MD   Date:    07/07/2025   Time:    16:27      FL Cholangiogram Thru Existing Catheter   Final Result      As above.         Electronically signed by: Hansel Tomas   Date:    07/03/2025   Time:    14:50      CT Previous   Final Result      Xray Previous   Final Result      Anesthesia US Guide Vascular Access    (Results Pending)           Assessment & Plan  Sepsis  Bacteremia due to Escherichia coli  Infection due to ESBL-producing Escherichia coli  -started on ertapenem 7/4, swab to meropenem by ID.  Likely will need at least 14 days IV antibiotics.   -repeat cultures drawn on 7/5, no growth thus far.  -tolerating oral intake, no pain.    Patient has sepsis without organ dysfunction secondary to Intra-abdominal Infection. A review of systems was  completed. Patient's sepsis is worsening. Will continue current treatment    Current Antibiotics    meropenem 2 g in 0.9% NaCl 100 mL IVPB (MB+), Every 8 hours (non-standard times), Intravenous    Lactate  Recent Labs   Lab 07/03/25  0743   LACTATE 1.0     Culture Data  Blood Cultures   Blood Culture   Date Value Ref Range Status   07/05/2025 No Growth After 48 Hours  Preliminary   07/05/2025 No Growth After 48 Hours  Preliminary   07/03/2025 Escherichia coli ESBL (A)  Final   07/03/2025 Escherichia coli ESBL (A)  Final   05/30/2025 No Growth After 5 Days  Final   05/30/2025 No Growth After 5 Days  Final      mSOFA  MSOFA Total  Min: 0   Min taken time: 07/08/25 0403  Max: 3   Max taken time: 07/08/25 0900      Biliary drain displacement  -CT A/P revealed 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   -IR performed cholangiogram with findings of a focal stenosis however contrast was flowing easily through that region.  See imaging findings for complete details.    -Patient reports plans for removal on 7/7/25 in [place].  Capping trial initiated on 07/05, patient doing well without any pain.  We will have IR remove drain on 07/07 if possible.  -Multimodal pain control requiring IV and PO narcotics    COPD (chronic obstructive pulmonary disease)  Patient's COPD is controlled currently.  Patient is currently off COPD Pathway. Continue scheduled inhalers Antibiotics and monitor respiratory status closely.   GERD (gastroesophageal reflux disease)  Resume home medications  On famotidine and protonix    Anxiety  Chronic, stable  Resume home medications  On Xanax, [place]PD[place]    VTE Risk Mitigation (From admission, onward)           Ordered     IP VTE HIGH RISK PATIENT  Once         07/03/25 0720     Place sequential compression device  Until discontinued         07/03/25 0720                    Discharge Planning   ALLAN: 7/8/2025     Code Status: Full Code   Medical  Readiness for Discharge Date: 7/8/2025  Discharge Plan A: Home with family, Home Health                        Andrea Sheldon MD  Department of Hospital Medicine   [facility] - Telemetry ([facility])

## 2025-07-08 NOTE — PROGRESS NOTES
[facility]  Infectious Disease  Progress Note    Patient Name: Tiffanie Gao  MRN: [id number]  Admission Date: 7/3/2025  Length of Stay: 4 days  Attending Physician: Andrea Sheldon MD  Primary Care Provider: Meeks, Claude H., MD    Isolation Status: Contact  Assessment/Plan:      ID  * Sepsis  [place] isSepsis from ESBL E coli bacteremia.  The source is the biliary system.  She says she does not want IV antibiotics for discharge  .  She does not have any effective oral options.  Outpatient Antibiotic Therapy Plan:     Please send referral to [facility].     1) Infection:  ESBL E coli bacteremia    2) Discharge Antibiotics:     Intravenous antibiotics:  IV meropenem 2 g every 8 hours   3) Therapy Duration: 10 days      Estimated end date of IV antibiotics: 07/14/25     4) Outpatient Weekly Labs:     Order the following labs to be drawn on Mondays:   CBC  CMP   CPK (when on Daptomycin)  ESR  CRP  -Can pull picc line at end of treatment unless instructed otherwise .   Please send all labs to [facility] .  Entirely up to now    GI  Biliary drain displacement  Will follow up primary team        Anticipated Disposition:     Thank you for your consult. I will follow-up with patient. Please contact us if you have any additional questions.    Jefferson Nguyen MD, [facility]  Infectious Disease  [facility])    Subjective:     Principal Problem:Sepsis    HPI: 50 y/o female with PMHx of COPD, asthma, liver disease, cholecystectomy, hysterectomy, partial liver resection, underwent PTC with drain placement in May,.  She was transferred from [place] with c/o fevers/chills, RUQ pain, and drainage worsening over past several weeks.   She had  PTC with drain placement, stent placed in bile duct 5/26; she noted discharge around tract site and completed course of antibiotics.  She was noted to have fever-T-max 102.5° on admit.  There was associated history of hyotension    CT A/P revealed  "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   Blood culture 0703 showed ESBL E coli  Interval History:     49 year old woman with E coli ESBL bacteremia.   She says she does not want IV antibiotics  Review of Systems   Constitutional:  Negative for activity change, appetite change, chills, diaphoresis and fatigue.     Objective:     Vital Signs (Most Recent):  Temp: 98.6 °F (37 °C) (07/07/25 2009)  Pulse: 88 (07/07/25 2058)  Resp: 20 (07/07/25 2058)  BP: 103/66 (07/07/25 2009)  SpO2: 96 % (07/07/25 2058) Vital Signs (24h Range):  Temp:  [97.5 °F (36.4 °C)-98.6 °F (37 °C)] 98.6 °F (37 °C)  Pulse:  [61-90] 88  Resp:  [16-20] 20  SpO2:  [96 %-99 %] 96 %  BP: ()/(57-83) 103/66     Weight: 43.8 kg (96 lb 9 oz)  Body mass index is 20.9 kg/m².    Estimated Creatinine Clearance: 76.7 mL/min (based on SCr of 0.6 mg/dL).     Physical Exam  Vitals and nursing note reviewed.   HENT:      Head: Normocephalic.   Eyes:      Pupils: Pupils are equal, round, and reactive to light.   Musculoskeletal:         General: Normal range of motion.   Neurological:      General: No focal deficit present.      Mental Status: She is alert. Mental status is at baseline.   Psychiatric:         Mood and Affect: Mood normal.          Significant Labs: Blood Culture: No results for input(s): "LABBLOO" in the last 4320 hours.  BMP:   Recent Labs   Lab 07/06/25  0455   GLU 81      K 3.6      CO2 31*   BUN 5*   CREATININE 0.6   CALCIUM 8.3*     CMP:   Recent Labs   Lab 07/06/25  0455      K 3.6      CO2 31*   GLU 81   BUN 5*   CREATININE 0.6   CALCIUM 8.3*   PROT 5.1*   ALBUMIN 2.2*   BILITOT 0.4   ALKPHOS 256*   AST 12   ALT 16   ANIONGAP 4*     Microbiology Results (last 7 days)       Procedure Component Value Units Date/Time    Blood culture [[id number]]  (Normal) Collected: 07/05/25 0428    Order Status: Completed Specimen: Blood from Peripheral, Antecubital, Left Updated: " 07/07/25 1501     Blood Culture No Growth After 48 Hours    Blood culture [[id number]]  (Normal) Collected: 07/05/25 0428    Order Status: Completed Specimen: Blood from Peripheral, Antecubital, Right Updated: 07/07/25 1501     Blood Culture No Growth After 48 Hours    Blood culture (site 2) [[id number]]  (Abnormal) Collected: 07/03/25 0743    Order Status: Completed Specimen: Blood Updated: 07/06/25 1032     Blood Culture Escherichia coli ESBL     GRAM STAIN Gram Negative Rods     Comment: Aerobic & Anaerobic Bottles Positive       Narrative:      For susceptibility see order number [id number]    Aerobic and Anaerobic Bottles Positive     Blood culture (site 1) [[id number]]  (Abnormal)  (Susceptibility) Collected: 07/03/25 0743    Order Status: Completed Specimen: Blood Updated: 07/06/25 1031     Blood Culture Escherichia coli ESBL     GRAM STAIN Gram Negative Rods     Comment: Aerobic Bottle Positive         Gram Negative Rods     Comment: Anaerobic Bottle Positive       Rapid Organism ID by PCR (from Blood culture) [[id number]]  (Abnormal) Collected: 07/03/25 0743    Order Status: Completed Specimen: Blood Updated: 07/04/25 1231     Enterococcus faecalis Not Detected     Enterococcus faecium Not Detected     Listeria monocytogenes Not Detected     Staphylococcus spp. Not Detected     Staphylococcus aureus Not Detected     Staphylococcus epidermidis Not Detected     Staphylococcus lugdunensis Not Detected     Streptococcus spp. Not Detected     Streptococcus agalactiae (Group B) Not Detected     Streptococcus pneumoniae Not Detected     Streptococcus pyogenes (Group A) Not Detected     Acinetobacter calcoaceticus/baumannii complex Not Detected     Bacteroides fragilis Not Detected     Enterobacterales See Species for ID     Enterobacter cloacae complex Not Detected     Escherichia coli Detected     Klebsiella aerogenes Not Detected     Klebsiella oxytoca Not Detected     Klebsiella pneumoniae group Not  Detected     Proteus spp. Not Detected     Salmonella spp. Not Detected     Serratia marcescens Not Detected     Haemophilus influenzae Not Detected     Neisseria meningitidis Not Detected     Pseudomonas aeruginosa Not Detected     Stenotrophomonas maltophilia Not Detected     Candida albicans Not Detected     Candida auris Not Detected     Candida glabrata Not Detected     Candida krusei Not Detected     Candida parapsilosis Not Detected     Candida tropicalis Not Detected     Cryptococcus neoformans/gattii Not Detected     CTX-M (ESBL ) Detected     Comment: Note: Antimicrobial resistance can occur via multiple mechanisms. A Not Detected result for antimicrobial resistance gene(s) does not indicate antimicrobial susceptibility. Subculturing is required for species identification and susceptibility testing of   isolates.        IMP (Cabapenemase ) Not Detected     Comment: Note: Antimicrobial resistance can occur via multiple mechanisms. A Not Detected result for antimicrobial resistance gene(s) does not indicate antimicrobial susceptibility. Subculturing is required for species identification and susceptibility testing of   isolates.        KPC resistance gene (Carbapenemase ) Not Detected     Comment: Note: Antimicrobial resistance can occur via multiple mechanisms. A Not Detected result for antimicrobial resistance gene(s) does not indicate antimicrobial susceptibility. Subculturing is required for species identification and susceptibility testing of   isolates.        mcr-1 Not Detected     Comment: Note: Antimicrobial resistance can occur via multiple mechanisms. A Not Detected result for antimicrobial resistance gene(s) does not indicate antimicrobial susceptibility. Subculturing is required for species identification and susceptibility testing of   isolates.        mecA ID N/A     Comment: Note: Antimicrobial resistance can occur via multiple mechanisms. A Not Detected result for  antimicrobial resistance gene(s) does not indicate antimicrobial susceptibility. Subculturing is required for species identification and susceptibility testing of   isolates.        mecA/C and MREJ (MRSA) gene N/A     Comment: Note: Antimicrobial resistance can occur via multiple mechanisms. A Not Detected result for antimicrobial resistance gene(s) does not indicate antimicrobial susceptibility. Subculturing is required for species identification and susceptibility testing of   isolates.        NDM (Carbapenemase ) Not Detected     Comment: Note: Antimicrobial resistance can occur via multiple mechanisms. A Not Detected result for antimicrobial resistance gene(s) does not indicate antimicrobial susceptibility. Subculturing is required for species identification and susceptibility testing of   isolates.        OXA-48-like (Carbapenemase ) Not Detected     Comment: Note: Antimicrobial resistance can occur via multiple mechanisms. A Not Detected result for antimicrobial resistance gene(s) does not indicate antimicrobial susceptibility. Subculturing is required for species identification and susceptibility testing of   isolates.        Pamela/B (VRE gene) N/A     Comment: Note: Antimicrobial resistance can occur via multiple mechanisms. A Not Detected result for antimicrobial resistance gene(s) does not indicate antimicrobial susceptibility. Subculturing is required for species identification and susceptibility testing of   isolates.        VIM (Carbapenemase ) Not Detected     Comment: Note: Antimicrobial resistance can occur via multiple mechanisms. A Not Detected result for antimicrobial resistance gene(s) does not indicate antimicrobial susceptibility. Subculturing is required for species identification and susceptibility testing of   isolates.       Rapid Organism ID by PCR (from Blood culture) [[id number]]  (Abnormal) Collected: 07/03/25 0743    Order Status: Completed Specimen: Blood  Updated: 07/04/25 0726     Enterococcus faecalis Not Detected     Enterococcus faecium Not Detected     Listeria monocytogenes Not Detected     Staphylococcus spp. Not Detected     Staphylococcus aureus Not Detected     Staphylococcus epidermidis Not Detected     Staphylococcus lugdunensis Not Detected     Streptococcus spp. Not Detected     Streptococcus agalactiae (Group B) Not Detected     Streptococcus pneumoniae Not Detected     Streptococcus pyogenes (Group A) Not Detected     Acinetobacter calcoaceticus/baumannii complex Not Detected     Bacteroides fragilis Not Detected     Enterobacterales See Species for ID     Enterobacter cloacae complex Not Detected     Escherichia coli Detected     Klebsiella aerogenes Not Detected     Klebsiella oxytoca Not Detected     Klebsiella pneumoniae group Not Detected     Proteus spp. Not Detected     Salmonella spp. Not Detected     Serratia marcescens Not Detected     Haemophilus influenzae Not Detected     Neisseria meningitidis Not Detected     Pseudomonas aeruginosa Not Detected     Stenotrophomonas maltophilia Not Detected     Candida albicans Not Detected     Candida auris Not Detected     Candida glabrata Not Detected     Candida krusei Not Detected     Candida parapsilosis Not Detected     Candida tropicalis Not Detected     Cryptococcus neoformans/gattii Not Detected     CTX-M (ESBL ) Detected     Comment: Note: Antimicrobial resistance can occur via multiple mechanisms. A Not Detected result for antimicrobial resistance gene(s) does not indicate antimicrobial susceptibility. Subculturing is required for species identification and susceptibility testing of   isolates.        IMP (Cabapenemase ) Not Detected     Comment: Note: Antimicrobial resistance can occur via multiple mechanisms. A Not Detected result for antimicrobial resistance gene(s) does not indicate antimicrobial susceptibility. Subculturing is required for species identification and  susceptibility testing of   isolates.        KPC resistance gene (Carbapenemase ) Not Detected     Comment: Note: Antimicrobial resistance can occur via multiple mechanisms. A Not Detected result for antimicrobial resistance gene(s) does not indicate antimicrobial susceptibility. Subculturing is required for species identification and susceptibility testing of   isolates.        mcr-1 Not Detected     Comment: Note: Antimicrobial resistance can occur via multiple mechanisms. A Not Detected result for antimicrobial resistance gene(s) does not indicate antimicrobial susceptibility. Subculturing is required for species identification and susceptibility testing of   isolates.        mecA ID N/A     Comment: Note: Antimicrobial resistance can occur via multiple mechanisms. A Not Detected result for antimicrobial resistance gene(s) does not indicate antimicrobial susceptibility. Subculturing is required for species identification and susceptibility testing of   isolates.        mecA/C and MREJ (MRSA) gene N/A     Comment: Note: Antimicrobial resistance can occur via multiple mechanisms. A Not Detected result for antimicrobial resistance gene(s) does not indicate antimicrobial susceptibility. Subculturing is required for species identification and susceptibility testing of   isolates.        NDM (Carbapenemase ) Not Detected     Comment: Note: Antimicrobial resistance can occur via multiple mechanisms. A Not Detected result for antimicrobial resistance gene(s) does not indicate antimicrobial susceptibility. Subculturing is required for species identification and susceptibility testing of   isolates.        OXA-48-like (Carbapenemase ) Not Detected     Comment: Note: Antimicrobial resistance can occur via multiple mechanisms. A Not Detected result for antimicrobial resistance gene(s) does not indicate antimicrobial susceptibility. Subculturing is required for species identification and susceptibility  testing of   isolates.        Pamela/B (VRE gene) N/A     Comment: Note: Antimicrobial resistance can occur via multiple mechanisms. A Not Detected result for antimicrobial resistance gene(s) does not indicate antimicrobial susceptibility. Subculturing is required for species identification and susceptibility testing of   isolates.        VIM (Carbapenemase ) Not Detected     Comment: Note: Antimicrobial resistance can occur via multiple mechanisms. A Not Detected result for antimicrobial resistance gene(s) does not indicate antimicrobial susceptibility. Subculturing is required for species identification and susceptibility testing of   isolates.               Significant Imaging: I have reviewed all pertinent imaging results/findings within the past 24 hours.

## 2025-07-08 NOTE — ASSESSMENT & PLAN NOTE
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   -IR performed cholangiogram with findings of a focal stenosis however contrast was flowing easily through that region.  See imaging findings for complete details.    -Patient reports plans for removal on 7/7/25 in [place].  Capping trial initiated on 07/05, patient doing well without any pain.  We will have IR remove drain on 07/07 if possible.  -Multimodal pain control requiring IV and PO narcotics

## 2025-07-08 NOTE — ASSESSMENT & PLAN NOTE
-started on ertapenem 7/4, swab to meropenem by ID.  Likely will need at least 14 days IV antibiotics.   -repeat cultures drawn on 7/5, no growth thus far.  -tolerating oral intake, no pain.    Patient has sepsis without organ dysfunction secondary to Intra-abdominal Infection. A review of systems was completed. Patient's sepsis is worsening. Will continue current treatment    Current Antibiotics    ertapenem (INVANZ) 1 g in 0.9% NaCl 100 mL IVPB (MB+), Once, Intravenous  , 2 times daily, Intravenous    Lactate  Recent Labs   Lab 07/03/25  0743   LACTATE 1.0     Culture Data  Blood Cultures   Blood Culture   Date Value Ref Range Status   07/05/2025 No Growth After 72 Hours  Preliminary   07/05/2025 No Growth After 72 Hours  Preliminary   07/03/2025 Escherichia coli ESBL (A)  Final   07/03/2025 Escherichia coli ESBL (A)  Final   05/30/2025 No Growth After 5 Days  Final   05/30/2025 No Growth After 5 Days  Final      mSOFA  MSOFA Total  Min: 0   Min taken time: 07/08/25 0403  Max: 3   Max taken time: 07/08/25 8161

## 2025-07-08 NOTE — PLAN OF CARE
Problem: Adult Inpatient Plan of Care  Goal: Plan of Care Review  Outcome: Progressing  Goal: Patient-Specific Goal (Individualized)  Outcome: Progressing  Goal: Absence of Hospital-Acquired Illness or Injury  Outcome: Progressing  Goal: Optimal Comfort and Wellbeing  Outcome: Progressing  Goal: Readiness for Transition of Care  Outcome: Progressing     Problem: Sepsis/Septic Shock  Goal: Optimal Coping  Outcome: Progressing  Goal: Absence of Bleeding  Outcome: Progressing  Goal: Blood Glucose Level Within Targeted Range  Outcome: Progressing  Goal: Absence of Infection Signs and Symptoms  Outcome: Progressing  Goal: Optimal Nutrition Intake  Outcome: Progressing     Problem: Fall Injury Risk  Goal: Absence of Fall and Fall-Related Injury  Outcome: Progressing     Problem: Pain Acute  Goal: Optimal Pain Control and Function  Outcome: Progressing     Problem: Activity Intolerance  Goal: Enhanced Capacity and Energy  Outcome: Progressing   POC reviewed with pt. Pt verbalizes understanding of POC. No questions at this time.  AAOx4. NADN.  NSR on cardiac monitor.  Pt remains free of falls.  No complaints at this time.  Safety measures in place. Will continue to monitor.  Informed pt to call for assistance before getting up. Pt verbalizes understanding.  Hourly rounding and chart check complete.

## 2025-07-08 NOTE — DISCHARGE SUMMARY
[facility] Medicine  Discharge Summary      Patient Name: Tiffanie Gao  MRN: [id number]  KHUSHI: [id number]  Patient Class: IP- Inpatient  Admission Date: 7/3/2025  Hospital Length of Stay: 5 days  Discharge Date and Time: 07/08/2025 4:17 PM  Attending Physician: Andrea Sheldon MD   Discharging Provider: Andrea Sheldon MD  Primary Care Provider: Meeks, Claude H., MD    Primary Care Team: Networked reference to record PCT     HPI:   50 y/o female with PMHx of COPD, asthma, liver disease, cholecystectomy, hysterectomy, partial liver resection, underwent PTC with drain placement in May, who presents as a transfer from [place] with c/o fevers/chills, RUQ pain, and drainage worsening over past several weeks. Chart reviewed via paper records sent by transferring facility. Patient s/p PTC with drain placement, stent placed in bile duct 5/26; she noted discharge around tract site and completed course of antibiotics. Drainage returned past several days. Her indwelling biliary drain was shortened by her surgeon yesterday.     Patient met sepsis criteria on arrival; Temp 102.5, , RR 20, /70, 94% on room air. SBP noted to drop to 80-90's.  WBC 7.06k with 93.2 neutrophils shift. Lactic acid 2.2 --> 0.6 after IV fluids. CXR with no acute findings. UA negative. Covid/Flu negative. 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 Reve iced 2L NS (> 30cc/kg bolus), hypotension resolved, received Zosyn. Admit inpatient for further management.      * No surgery found *      Hospital Course:   Patient presented with fever 102.5, tachycardic, right upper quadrant abdominal pain, fevers with an existing biliary drain following PTC in May.  Underwent cholangiogram by IR on 7/3, which showed contrast flowing freely through the stenosis, however drain kept in due to her symptoms.  Found to have ESBL E coli bacteremia, started on ertapenem on  7/4, subsequently swapped to meropenem by ID.  Repeat cultures drawn on 07/05.  Capping trials started on 07/05, patient had improvement in her pain, tolerated diet diet.  Spoke with IR about removal of drain which was originally scheduled in [place] on 07/07.  However we will have IR remove drain at our facility.  Case management consulted for antibiotic arrangements.    7/7/25  NAEON, drain removed this afternoon  Patient hesitant for home IV antibiotics, discussed necessary to treat at this time  Discussed with Dr. Nguyen, plans for IV antibiotics  Plans for Merrem x 2 weeks    7/8/25  NAEON, patient doing well, denies complaints  Drain removed today by IR  Discussed with Dr. Nguyen, patient refusing PICC line placement  Plan for ertapenem 500 mg IM BID to complete course  Stable for discharge home  F/U with PCP in 1-2 weeks for further management         Goals of Care Treatment Preferences:  Code Status: Full Code         Consults:   Consults (From admission, onward)          Status Ordering Provider     Inpatient consult to Social Work  Once        Provider:  (Not yet assigned)    Completed YU, GULSHAN     Inpatient consult to Social Work  Once        Provider:  (Not yet assigned)    Completed YU, GULSHAN     Inpatient consult to Social Work  Once        Provider:  (Not yet assigned)    Completed ALMONTE, NIPUR R     Inpatient consult to Interventional Radiology  Once        Provider:  Cheng Toledo MD    Completed ALMONTE, NIPUR R     Inpatient consult to Infectious Diseases  Once        Provider:  Jefferson Nguyen MD, NICOLE    Acknowledged ALMONTE, NIPUR R     Inpatient consult to Interventional Radiology  Once        Provider:  Wiley Bowers PA    Completed YU, GULSHAN            Assessment & Plan  Bacteremia due to Escherichia coli  Infection due to ESBL-producing Escherichia coli  -started on ertapenem 7/4, swab to meropenem by ID.  Likely will need at least 14 days IV antibiotics.   -repeat  cultures drawn on 7/5, no growth thus far.  -tolerating oral intake, no pain.    Patient has sepsis without organ dysfunction secondary to Intra-abdominal Infection. A review of systems was completed. Patient's sepsis is worsening. Will continue current treatment    Current Antibiotics    ertapenem (INVANZ) 1 g in 0.9% NaCl 100 mL IVPB (MB+), Once, Intravenous  , 2 times daily, Intravenous    Lactate  Recent Labs   Lab 07/03/25  0743   LACTATE 1.0     Culture Data  Blood Cultures   Blood Culture   Date Value Ref Range Status   07/05/2025 No Growth After 72 Hours  Preliminary   07/05/2025 No Growth After 72 Hours  Preliminary   07/03/2025 Escherichia coli ESBL (A)  Final   07/03/2025 Escherichia coli ESBL (A)  Final   05/30/2025 No Growth After 5 Days  Final   05/30/2025 No Growth After 5 Days  Final      mSOFA  MSOFA Total  Min: 0   Min taken time: 07/08/25 0403  Max: 3   Max taken time: 07/08/25 1601      Biliary drain displacement  -CT A/P revealed 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   -IR performed cholangiogram with findings of a focal stenosis however contrast was flowing easily through that region.  See imaging findings for complete details.    -Patient reports plans for removal on 7/7/25 in [place].  Capping trial initiated on 07/05, patient doing well without any pain.  We will have IR remove drain on 07/07 if possible.  -Multimodal pain control requiring IV and PO narcotics    COPD (chronic obstructive pulmonary disease)  Patient's COPD is controlled currently.  Patient is currently off COPD Pathway. Continue scheduled inhalers Antibiotics and monitor respiratory status closely.   GERD (gastroesophageal reflux disease)  Resume home medications  On famotidine and protonix    Anxiety  Chronic, stable  Resume home medications  On Xanax, [place]-PD[place] reviewed    Final Active Diagnoses:    Diagnosis Date Noted POA    Biliary drain displacement [T85.520A]  07/03/2025 Yes    COPD (chronic obstructive pulmonary disease) [J44.9] 05/26/2025 Yes    GERD (gastroesophageal reflux disease) [K21.9] 05/26/2025 Yes    Anxiety [F41.9] 07/03/2025 Yes    Bacteremia due to Escherichia coli [R78.81, B96.20] 07/04/2025 Yes    Infection due to ESBL-producing Escherichia coli [A49.8, Z16.12] 07/04/2025 Yes      Problems Resolved During this Admission:    Diagnosis Date Noted Date Resolved POA    PRINCIPAL PROBLEM:  Sepsis [A41.9] 07/03/2025 07/08/2025 Yes       Discharged Condition: stable    [place] [facility] [place] [facility] Up:   Contact information for follow-up providers       Meeks, Claude H., MD. Schedule an appointment as soon as possible for a visit in 2 week(s).    Specialty: Family Medicine  Why: Hospital discharge follow up  Contact information:  [address] [place] [address] DR Mona RO [id number] [phone]                       Contact information for after-discharge care       Dialysis/Infusion       [facility] .    Service: Home Infusion and Injection  Contact information:  [address] [address] [phone]                     Home Medical Care       [facility] .    Service: Home Health Services  Contact information:  [address], [address]  [facility] [address] [phone]                                 Patient Instructions:   No discharge procedures on file.    Significant Diagnostic Studies: Labs: All labs within the past 24 hours have been reviewed    Pending Diagnostic Studies:       None           Medications:  Reconciled Home Medications:      Medication List        START taking these medications      ERTAPENEM (INVANZ) 1 G/100 ML NS (READY TO MIX)  Inject 50 mLs (0.5 g total) into the vein 2 (two) times a day. for 10 days     oxyCODONE 5 MG immediate release tablet  Commonly known as: ROXICODONE  Take 1 tablet (5 mg total) by mouth every 8 (eight) hours  as needed for Pain.            CHANGE how you take these medications      ondansetron 4 MG Tbdl  Commonly known as: ZOFRAN-ODT  Take 1 tablet (4 mg total) by mouth every 6 (six) hours as needed (Nausea).  What changed: reasons to take this            CONTINUE taking these medications      albuterol 90 mcg/actuation inhaler  Commonly known as: PROVENTIL/VENTOLIN HFA  Inhale into the lungs.     albuterol-ipratropium 2.5 mg-0.5 mg/3 mL nebulizer solution  Commonly known as: DUO-NEB  Inhale 3 mLs into the lungs every 6 (six) hours as needed.     alendronate 70 MG tablet  Commonly known as: FOSAMAX  Take 70 mg by mouth every 7 days.     ALPRAZolam 0.5 MG tablet  Commonly known as: XANAX  Take 0.5 mg by mouth daily as needed.     CAPLYTA 42 mg Cap  Generic drug: lumateperone  Take 1 capsule by mouth Daily.     famotidine 40 MG tablet  Commonly known as: PEPCID  Take 40 mg by mouth.     pantoprazole 40 MG tablet  Commonly known as: PROTONIX  Take 40 mg by mouth every evening.     rOPINIRole 2 MG tablet  Commonly known as: REQUIP  Take 2 mg by mouth 2 (two) times daily.     SYMBICORT 160-4.5 mcg/actuation Hfaa  Generic drug: budesonide-formoterol 160-4.5 mcg  Inhale 2 puffs into the lungs every 12 (twelve) hours.     traZODone 100 MG tablet  Commonly known as: DESYREL  Take 100 mg by mouth every evening.              Indwelling Lines/Drains at time of discharge:   Lines/Drains/Airways       None                       Time spent on the discharge of patient: 40 minutes         Andrea Sheldon MD  Department of Hospital Medicine  [facility] - Telemetry ([facility])

## 2025-07-08 NOTE — SUBJECTIVE & OBJECTIVE
"Interval History:     49 year old woman with E coli ESBL bacteremia.   She says she does not want IV antibiotics  Review of Systems   Constitutional:  Negative for activity change, appetite change, chills, diaphoresis and fatigue.     Objective:     Vital Signs (Most Recent):  Temp: 98.6 °F (37 °C) (07/07/25 2009)  Pulse: 88 (07/07/25 2058)  Resp: 20 (07/07/25 2058)  BP: 103/66 (07/07/25 2009)  SpO2: 96 % (07/07/25 2058) Vital Signs (24h Range):  Temp:  [97.5 °F (36.4 °C)-98.6 °F (37 °C)] 98.6 °F (37 °C)  Pulse:  [61-90] 88  Resp:  [16-20] 20  SpO2:  [96 %-99 %] 96 %  BP: ()/(57-83) 103/66     Weight: 43.8 kg (96 lb 9 oz)  Body mass index is 20.9 kg/m².    Estimated Creatinine Clearance: 76.7 mL/min (based on SCr of 0.6 mg/dL).     Physical Exam  Vitals and nursing note reviewed.   HENT:      Head: Normocephalic.   Eyes:      Pupils: Pupils are equal, round, and reactive to light.   Musculoskeletal:         General: Normal range of motion.   Neurological:      General: No focal deficit present.      Mental Status: She is alert. Mental status is at baseline.   Psychiatric:         Mood and Affect: Mood normal.          Significant Labs: Blood Culture: No results for input(s): "LABBLOO" in the last 4320 hours.  BMP:   Recent Labs   Lab 07/06/25  0455   GLU 81      K 3.6      CO2 31*   BUN 5*   CREATININE 0.6   CALCIUM 8.3*     CMP:   Recent Labs   Lab 07/06/25  0455      K 3.6      CO2 31*   GLU 81   BUN 5*   CREATININE 0.6   CALCIUM 8.3*   PROT 5.1*   ALBUMIN 2.2*   BILITOT 0.4   ALKPHOS 256*   AST 12   ALT 16   ANIONGAP 4*     Microbiology Results (last 7 days)       Procedure Component Value Units Date/Time    Blood culture [[id number]]  (Normal) Collected: 07/05/25 0428    Order Status: Completed Specimen: Blood from Peripheral, Antecubital, Left Updated: 07/07/25 1501     Blood Culture No Growth After 48 Hours    Blood culture [[id number]]  (Normal) Collected: 07/05/25 0428    Order " Status: Completed Specimen: Blood from Peripheral, Antecubital, Right Updated: 07/07/25 1501     Blood Culture No Growth After 48 Hours    Blood culture (site 2) [[id number]]  (Abnormal) Collected: 07/03/25 0743    Order Status: Completed Specimen: Blood Updated: 07/06/25 1032     Blood Culture Escherichia coli ESBL     GRAM STAIN Gram Negative Rods     Comment: Aerobic & Anaerobic Bottles Positive       Narrative:      For susceptibility see order number [id number]    Aerobic and Anaerobic Bottles Positive     Blood culture (site 1) [[id number]]  (Abnormal)  (Susceptibility) Collected: 07/03/25 0743    Order Status: Completed Specimen: Blood Updated: 07/06/25 1031     Blood Culture Escherichia coli ESBL     GRAM STAIN Gram Negative Rods     Comment: Aerobic Bottle Positive         Gram Negative Rods     Comment: Anaerobic Bottle Positive       Rapid Organism ID by PCR (from Blood culture) [[id number]]  (Abnormal) Collected: 07/03/25 0743    Order Status: Completed Specimen: Blood Updated: 07/04/25 1231     Enterococcus faecalis Not Detected     Enterococcus faecium Not Detected     Listeria monocytogenes Not Detected     Staphylococcus spp. Not Detected     Staphylococcus aureus Not Detected     Staphylococcus epidermidis Not Detected     Staphylococcus lugdunensis Not Detected     Streptococcus spp. Not Detected     Streptococcus agalactiae (Group B) Not Detected     Streptococcus pneumoniae Not Detected     Streptococcus pyogenes (Group A) Not Detected     Acinetobacter calcoaceticus/baumannii complex Not Detected     Bacteroides fragilis Not Detected     Enterobacterales See Species for ID     Enterobacter cloacae complex Not Detected     Escherichia coli Detected     Klebsiella aerogenes Not Detected     Klebsiella oxytoca Not Detected     Klebsiella pneumoniae group Not Detected     Proteus spp. Not Detected     Salmonella spp. Not Detected     Serratia marcescens Not Detected     Haemophilus influenzae  Not Detected     Neisseria meningitidis Not Detected     Pseudomonas aeruginosa Not Detected     Stenotrophomonas maltophilia Not Detected     Candida albicans Not Detected     Candida auris Not Detected     Candida glabrata Not Detected     Candida krusei Not Detected     Candida parapsilosis Not Detected     Candida tropicalis Not Detected     Cryptococcus neoformans/gattii Not Detected     CTX-M (ESBL ) Detected     Comment: Note: Antimicrobial resistance can occur via multiple mechanisms. A Not Detected result for antimicrobial resistance gene(s) does not indicate antimicrobial susceptibility. Subculturing is required for species identification and susceptibility testing of   isolates.        IMP (Cabapenemase ) Not Detected     Comment: Note: Antimicrobial resistance can occur via multiple mechanisms. A Not Detected result for antimicrobial resistance gene(s) does not indicate antimicrobial susceptibility. Subculturing is required for species identification and susceptibility testing of   isolates.        KPC resistance gene (Carbapenemase ) Not Detected     Comment: Note: Antimicrobial resistance can occur via multiple mechanisms. A Not Detected result for antimicrobial resistance gene(s) does not indicate antimicrobial susceptibility. Subculturing is required for species identification and susceptibility testing of   isolates.        mcr-1 Not Detected     Comment: Note: Antimicrobial resistance can occur via multiple mechanisms. A Not Detected result for antimicrobial resistance gene(s) does not indicate antimicrobial susceptibility. Subculturing is required for species identification and susceptibility testing of   isolates.        mecA ID N/A     Comment: Note: Antimicrobial resistance can occur via multiple mechanisms. A Not Detected result for antimicrobial resistance gene(s) does not indicate antimicrobial susceptibility. Subculturing is required for species identification and  susceptibility testing of   isolates.        mecA/C and MREJ (MRSA) gene N/A     Comment: Note: Antimicrobial resistance can occur via multiple mechanisms. A Not Detected result for antimicrobial resistance gene(s) does not indicate antimicrobial susceptibility. Subculturing is required for species identification and susceptibility testing of   isolates.        NDM (Carbapenemase ) Not Detected     Comment: Note: Antimicrobial resistance can occur via multiple mechanisms. A Not Detected result for antimicrobial resistance gene(s) does not indicate antimicrobial susceptibility. Subculturing is required for species identification and susceptibility testing of   isolates.        OXA-48-like (Carbapenemase ) Not Detected     Comment: Note: Antimicrobial resistance can occur via multiple mechanisms. A Not Detected result for antimicrobial resistance gene(s) does not indicate antimicrobial susceptibility. Subculturing is required for species identification and susceptibility testing of   isolates.        Pamela/B (VRE gene) N/A     Comment: Note: Antimicrobial resistance can occur via multiple mechanisms. A Not Detected result for antimicrobial resistance gene(s) does not indicate antimicrobial susceptibility. Subculturing is required for species identification and susceptibility testing of   isolates.        VIM (Carbapenemase ) Not Detected     Comment: Note: Antimicrobial resistance can occur via multiple mechanisms. A Not Detected result for antimicrobial resistance gene(s) does not indicate antimicrobial susceptibility. Subculturing is required for species identification and susceptibility testing of   isolates.       Rapid Organism ID by PCR (from Blood culture) [[id number]]  (Abnormal) Collected: 07/03/25 0743    Order Status: Completed Specimen: Blood Updated: 07/04/25 0726     Enterococcus faecalis Not Detected     Enterococcus faecium Not Detected     Listeria monocytogenes Not Detected      Staphylococcus spp. Not Detected     Staphylococcus aureus Not Detected     Staphylococcus epidermidis Not Detected     Staphylococcus lugdunensis Not Detected     Streptococcus spp. Not Detected     Streptococcus agalactiae (Group B) Not Detected     Streptococcus pneumoniae Not Detected     Streptococcus pyogenes (Group A) Not Detected     Acinetobacter calcoaceticus/baumannii complex Not Detected     Bacteroides fragilis Not Detected     Enterobacterales See Species for ID     Enterobacter cloacae complex Not Detected     Escherichia coli Detected     Klebsiella aerogenes Not Detected     Klebsiella oxytoca Not Detected     Klebsiella pneumoniae group Not Detected     Proteus spp. Not Detected     Salmonella spp. Not Detected     Serratia marcescens Not Detected     Haemophilus influenzae Not Detected     Neisseria meningitidis Not Detected     Pseudomonas aeruginosa Not Detected     Stenotrophomonas maltophilia Not Detected     Candida albicans Not Detected     Candida auris Not Detected     Candida glabrata Not Detected     Candida krusei Not Detected     Candida parapsilosis Not Detected     Candida tropicalis Not Detected     Cryptococcus neoformans/gattii Not Detected     CTX-M (ESBL ) Detected     Comment: Note: Antimicrobial resistance can occur via multiple mechanisms. A Not Detected result for antimicrobial resistance gene(s) does not indicate antimicrobial susceptibility. Subculturing is required for species identification and susceptibility testing of   isolates.        IMP (Cabapenemase ) Not Detected     Comment: Note: Antimicrobial resistance can occur via multiple mechanisms. A Not Detected result for antimicrobial resistance gene(s) does not indicate antimicrobial susceptibility. Subculturing is required for species identification and susceptibility testing of   isolates.        KPC resistance gene (Carbapenemase ) Not Detected     Comment: Note: Antimicrobial resistance  can occur via multiple mechanisms. A Not Detected result for antimicrobial resistance gene(s) does not indicate antimicrobial susceptibility. Subculturing is required for species identification and susceptibility testing of   isolates.        mcr-1 Not Detected     Comment: Note: Antimicrobial resistance can occur via multiple mechanisms. A Not Detected result for antimicrobial resistance gene(s) does not indicate antimicrobial susceptibility. Subculturing is required for species identification and susceptibility testing of   isolates.        mecA ID N/A     Comment: Note: Antimicrobial resistance can occur via multiple mechanisms. A Not Detected result for antimicrobial resistance gene(s) does not indicate antimicrobial susceptibility. Subculturing is required for species identification and susceptibility testing of   isolates.        mecA/C and MREJ (MRSA) gene N/A     Comment: Note: Antimicrobial resistance can occur via multiple mechanisms. A Not Detected result for antimicrobial resistance gene(s) does not indicate antimicrobial susceptibility. Subculturing is required for species identification and susceptibility testing of   isolates.        NDM (Carbapenemase ) Not Detected     Comment: Note: Antimicrobial resistance can occur via multiple mechanisms. A Not Detected result for antimicrobial resistance gene(s) does not indicate antimicrobial susceptibility. Subculturing is required for species identification and susceptibility testing of   isolates.        OXA-48-like (Carbapenemase ) Not Detected     Comment: Note: Antimicrobial resistance can occur via multiple mechanisms. A Not Detected result for antimicrobial resistance gene(s) does not indicate antimicrobial susceptibility. Subculturing is required for species identification and susceptibility testing of   isolates.        Pamela/B (VRE gene) N/A     Comment: Note: Antimicrobial resistance can occur via multiple mechanisms. A Not Detected  result for antimicrobial resistance gene(s) does not indicate antimicrobial susceptibility. Subculturing is required for species identification and susceptibility testing of   isolates.        VIM (Carbapenemase ) Not Detected     Comment: Note: Antimicrobial resistance can occur via multiple mechanisms. A Not Detected result for antimicrobial resistance gene(s) does not indicate antimicrobial susceptibility. Subculturing is required for species identification and susceptibility testing of   isolates.               Significant Imaging: I have reviewed all pertinent imaging results/findings within the past 24 hours.

## 2025-07-08 NOTE — ASSESSMENT & PLAN NOTE
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   -IR performed cholangiogram with findings of a focal stenosis however contrast was flowing easily through that region.  See imaging findings for complete details.    -Patient reports plans for removal on 7/7/25 in [place].  Capping trial initiated on 07/05, patient doing well without any pain.  We will have IR remove drain on 07/07 if possible.  -Multimodal pain control requiring IV and PO narcotics

## 2025-07-08 NOTE — CONSULTS
[facility] - Telemetry (Hospital)  Discharge Final Note    Primary Care Provider: Meeks, Claude H., MD    Expected Discharge Date: 7/8/2025    Final Discharge Note (most recent)       Final Note - 07/08/25 1529          Final Note    Assessment Type Final Discharge Note     Anticipated Discharge Disposition Home-Health Care [facility] Resources/Appts/Education Provided Post-Acute resouces added to AVS        Post-Acute Status    Post-Acute Authorization Home Health;IV Infusion     Home Health Status Set-up Complete/Auth obtained     IV Infusion Status Set-up Complete/Auth obtained     Discharge Delays None known at this time                   Pt to discharge home today.  [facility] processed new ID recs for IM ertapenem; teaching completed at bedside and medication will be delivered to home this evening.   [facility] notified of DC today and change in ABX via Epic. AVS updated.    Important Message from Medicare             After-discharge care                Dialysis/Infusion       [facility] [place]   Service: Home Infusion and Injection    [address]  [address] [address]   Phone: [phone]                 Home Medical Care       [facility]   Service: Home Health Services    [address] [place] [address], [address]  [facility] [address]   Phone: [phone]

## 2025-07-08 NOTE — SUBJECTIVE & OBJECTIVE
Review of Systems   All other systems reviewed and are negative.    Objective:     Vital Signs (Most Recent):  Temp: 98.1 °F (36.7 °C) (07/08/25 0738)  Pulse: 83 (07/08/25 0818)  Resp: 16 (07/08/25 0944)  BP: 106/75 (07/08/25 0738)  SpO2: 99 % (07/08/25 0818) Vital Signs (24h Range):  Temp:  [97.5 °F (36.4 °C)-98.7 °F (37.1 °C)] 98.1 °F (36.7 °C)  Pulse:  [66-90] 83  Resp:  [16-20] 16  SpO2:  [96 %-99 %] 99 %  BP: ()/(53-83) 106/75     Weight: 42.6 kg (93 lb 14.7 oz)  Body mass index is 20.32 kg/m².    Intake/Output Summary (Last 24 hours) at 7/8/2025 1001  Last data filed at 7/7/2025 1927  Gross per 24 hour   Intake 428.18 ml   Output --   Net 428.18 ml         Physical Exam  Vitals and nursing note reviewed.   Constitutional:       General: She is not in acute distress.     Appearance: Normal appearance. She is normal weight.   Cardiovascular:      Rate and Rhythm: Normal rate and regular rhythm.      Heart sounds: No murmur heard.  Pulmonary:      Effort: Pulmonary effort is normal. No respiratory distress.      Breath sounds: No wheezing.   Neurological:      General: No focal deficit present.      Mental Status: She is alert and oriented to person, place, and time.   Psychiatric:         Mood and Affect: Mood normal.         Behavior: Behavior normal.               Significant Labs: All pertinent labs within the past 24 hours have been reviewed.  Recent Lab Results         07/08/25  0430        Baso # 0.04       Basophil % 0.8       Eos # 0.21       Eos % 4.0       Gran # (ANC) 3.15       Hematocrit 34.1       Hemoglobin 11.1       Extra Tube Hold for add-ons.  Comment: Auto resulted.          Immature Grans (Abs) 0.07  Comment: Mild elevation in immature granulocytes is non specific and can be seen in a variety of conditions including stress response, acute inflammation, trauma and pregnancy. Correlation with other laboratory and clinical findings is essential.       Immature Granulocytes 1.3        Lymph # 1.23       Lymph % 23.6       MCH 28.6       MCHC 32.6       MCV 88       Mono # 0.52       Mono % 10.0       MPV 10.0       Neut % 60.3       nRBC 0       Platelet Count 293       RBC 3.88       RDW 13.2       WBC 5.22               Significant Imaging: I have reviewed all pertinent imaging results/findings within the past 24 hours.    IR Biliary Catheter Removal with Imaging   Final Result   FINDINGS/   Tube was prepped and draped under sterile conditions.  Existing 10 Albanian external biliary tube was injected which demonstrate patency of the ducts without evidence of obstruction.  The tube was cut and removed over a wire without difficulty.  Sterile dressing was applied.         Electronically signed by: Cheng Toledo MD   Date:    07/07/2025   Time:    16:27      FL Cholangiogram Thru Existing Catheter   Final Result      As above.         Electronically signed by: Hansel Tomas   Date:    07/03/2025   Time:    14:50      CT Previous   Final Result      Xray Previous   Final Result      Anesthesia US Guide Vascular Access    (Results Pending)

## 2025-07-08 NOTE — CONSULTS
SW met with patient regarding consult for home infusion and home health. Pt referred and accepted by [facility] ([place] office). Pt reports she's does not want a PICC line. SW inquired about pt's concerns - pt reports she concerned about infection and does not want it. Spouse at bedside.   Consultant [facility] rep unable to complete bedside education d/t pt refusing PICC at this time. MD and nursing updated.  SW to follow.

## 2025-07-08 NOTE — ASSESSMENT & PLAN NOTE
-started on ertapenem 7/4, swab to meropenem by ID.  Likely will need at least 14 days IV antibiotics.   -repeat cultures drawn on 7/5, no growth thus far.  -tolerating oral intake, no pain.    Patient has sepsis without organ dysfunction secondary to Intra-abdominal Infection. A review of systems was completed. Patient's sepsis is worsening. Will continue current treatment    Current Antibiotics    meropenem 2 g in 0.9% NaCl 100 mL IVPB (MB+), Every 8 hours (non-standard times), Intravenous    Lactate  Recent Labs   Lab 07/03/25  0743   LACTATE 1.0     Culture Data  Blood Cultures   Blood Culture   Date Value Ref Range Status   07/05/2025 No Growth After 48 Hours  Preliminary   07/05/2025 No Growth After 48 Hours  Preliminary   07/03/2025 Escherichia coli ESBL (A)  Final   07/03/2025 Escherichia coli ESBL (A)  Final   05/30/2025 No Growth After 5 Days  Final   05/30/2025 No Growth After 5 Days  Final      mSOFA  MSOFA Total  Min: 0   Min taken time: 07/08/25 0403  Max: 3   Max taken time: 07/08/25 0900

## 2025-07-08 NOTE — NURSING
Unable to assuage pt of her fears of getting a picc line.  She googled complications and is adamant about not getting one.  Doc and  are aware and soc worker was at bedside.

## 2025-07-08 NOTE — ASSESSMENT & PLAN NOTE
-started on ertapenem 7/4, swab to meropenem by ID.  Likely will need at least 14 days IV antibiotics.   -repeat cultures drawn on 7/5, no growth thus far.  -tolerating oral intake, no pain.    Patient has sepsis without organ dysfunction secondary to Intra-abdominal Infection. A review of systems was completed. Patient's sepsis is worsening. Will continue current treatment    Current Antibiotics    ertapenem (INVANZ) 1 g in 0.9% NaCl 100 mL IVPB (MB+), Once, Intravenous  , 2 times daily, Intravenous    Lactate  Recent Labs   Lab 07/03/25  0743   LACTATE 1.0     Culture Data  Blood Cultures   Blood Culture   Date Value Ref Range Status   07/05/2025 No Growth After 72 Hours  Preliminary   07/05/2025 No Growth After 72 Hours  Preliminary   07/03/2025 Escherichia coli ESBL (A)  Final   07/03/2025 Escherichia coli ESBL (A)  Final   05/30/2025 No Growth After 5 Days  Final   05/30/2025 No Growth After 5 Days  Final      mSOFA  MSOFA Total  Min: 0   Min taken time: 07/08/25 0403  Max: 3   Max taken time: 07/08/25 8101

## 2025-07-08 NOTE — DISCHARGE INSTRUCTIONS
Our goal at [facility] is to always give you outstanding care and exceptional service. You may receive a survey from [employer] by mail, text or e-mail in the next 24-48 hours asking about the care you received with us. The survey should only take 5-10 minutes to complete and is very important to us.     Your feedback provides us with a way to recognize our staff who work tirelessly to provide the best care! Also, your responses help us learn how to improve when your experience was below our aspiration of excellence. We are always looking for ways to improve your stay. We WILL use your feedback to continue making improvements to help us provide the highest quality care. We keep your personal information and feedback confidential. We appreciate your time completing this survey and can't wait to hear from you!!!    We look forward to your continued care with us! Thanks so much for choosing [facility] for your healthcare needs!

## 2025-07-08 NOTE — ASSESSMENT & PLAN NOTE
[place]ook farm isSepsis from ESBL E coli bacteremia.  The source is the biliary system.  She says she does not want IV antibiotics for discharge  .  She does not have any effective oral options.  Outpatient Antibiotic Therapy Plan:     Please send referral to [facility].     1) Infection:  ESBL E coli bacteremia    2) Discharge Antibiotics:     Intravenous antibiotics:  IV meropenem 2 g every 8 hours   3) Therapy Duration: 10 days      Estimated end date of IV antibiotics: 07/14/25     4) Outpatient Weekly Labs:     Order the following labs to be drawn on Mondays:   CBC  CMP   CPK (when on Daptomycin)  ESR  CRP  -Can pull picc line at end of treatment unless instructed otherwise .   Please send all labs to [facility] .  Entirely up to now

## 2025-07-08 NOTE — NURSING TRANSFER
Nursing Transfer Note      7/7/2025   4:00 PM    Nurse giving handoff: Ej  Nurse receiving handoff: Cheyenne    Reason patient is being transferred: Post-op    Transfer To: Tele 250    Transfer via stretcher    Transfer with cardiac monitoring    Transported by PACU RN    Transfer Vital Signs:  Blood Pressure:126/75  Heart Rate: 90  O2:99  Temperature:97.5  Respirations:18    Telemetry: Box Number [id number], Rate 90, Rhythm NSR, and Telemetry  Lucrecia  Order for Tele Monitor? Yes    Additional Lines: N/A    Medicines sent: N/A    Any special needs or follow-up needed: N/A    Patient belongings transferred with patient: Yes    Chart send with patient: Yes    Notified: friend    Patient reassessed at: 07/07/25, 4pm (date, time)  1  Upon arrival to floor: cardiac monitor applied, patient oriented to room, call bell in reach, and bed in lowest position

## 2025-07-08 NOTE — PLAN OF CARE
POC reviewed w/ patient. Pt verbalizes understanding of plan  Chart/Orders reviewed  All safety precautions in place; No falls this shift  Pt resting in bed  Pain controlled  Continuous rounding c bed in lowest position, side rails up, call light in reach  Will continue to monitor until the end of shift  Problem: Adult Inpatient Plan of Care  Goal: Plan of Care Review  Outcome: Progressing  Flowsheets (Taken 7/8/2025 0312)  Plan of Care Reviewed With: patient  Goal: Patient-Specific Goal (Individualized)  Outcome: Progressing  Flowsheets (Taken 7/8/2025 0312)  Individualized Care Needs: none  Anxieties, Fears or Concerns: none  Patient/Family-Specific Goals (Include Timeframe): none  Goal: Absence of Hospital-Acquired Illness or Injury  Outcome: Progressing  Goal: Optimal Comfort and Wellbeing  Outcome: Progressing  Goal: Readiness for Transition of Care  Outcome: Progressing

## 2025-07-08 NOTE — PLAN OF CARE
Problem: Adult Inpatient Plan of Care  Goal: Plan of Care Review  Outcome: Adequate for Care Transition  Goal: Patient-Specific Goal (Individualized)  Outcome: Adequate for Care Transition  Goal: Absence of Hospital-Acquired Illness or Injury  Outcome: Adequate for Care Transition  Goal: Optimal Comfort and Wellbeing  Outcome: Adequate for Care Transition  Goal: Readiness for Transition of Care  Outcome: Adequate for Care Transition     Problem: Sepsis/Septic Shock  Goal: Optimal Coping  Outcome: Adequate for Care Transition  Goal: Absence of Bleeding  Outcome: Adequate for Care Transition  Goal: Blood Glucose Level Within Targeted Range  Outcome: Adequate for Care Transition  Goal: Absence of Infection Signs and Symptoms  Outcome: Adequate for Care Transition  Goal: Optimal Nutrition Intake  Outcome: Adequate for Care Transition     Problem: Fall Injury Risk  Goal: Absence of Fall and Fall-Related Injury  Outcome: Adequate for Care Transition     Problem: Pain Acute  Goal: Optimal Pain Control and Function  Outcome: Adequate for Care Transition     Problem: Activity Intolerance  Goal: Enhanced Capacity and Energy  Outcome: Adequate for Care Transition     Problem: Infection  Goal: Absence of Infection Signs and Symptoms  Outcome: Adequate for Care Transition

## 2025-07-09 NOTE — SUBJECTIVE & OBJECTIVE
"Interval History:   49-year-old woman with ESBL Klebsiella bacteremia.  She continues to refuse IV antibiotics and PICC line    Review of Systems   Constitutional:  Negative for activity change, appetite change, chills and diaphoresis.   HENT:  Negative for congestion.    Neurological:  Negative for dizziness and facial asymmetry.     Objective:     Vital Signs (Most Recent):  Temp: 97.8 °F (36.6 °C) (07/08/25 1644)  Pulse: 79 (07/08/25 1644)  Resp: 18 (07/08/25 1644)  BP: 123/75 (07/08/25 1644)  SpO2: 98 % (07/08/25 1644) Vital Signs (24h Range):  Temp:  [97.8 °F (36.6 °C)-98.1 °F (36.7 °C)] 97.8 °F (36.6 °C)  Pulse:  [78-92] 79  Resp:  [16-18] 18  SpO2:  [98 %-99 %] 98 %  BP: (106-123)/(75-76) 123/75     Weight: 42.6 kg (93 lb 14.7 oz)  Body mass index is 20.32 kg/m².    Estimated Creatinine Clearance: 74.6 mL/min (based on SCr of 0.6 mg/dL).     Physical Exam  Vitals and nursing note reviewed.   HENT:      Head: Normocephalic.   Musculoskeletal:         General: Normal range of motion.   Skin:     General: Skin is warm.   Neurological:      General: No focal deficit present.      Mental Status: She is alert.          Significant Labs: Blood Culture: No results for input(s): "LABBLOO" in the last 4320 hours.  CBC:   Recent Labs   Lab 07/08/25  0430   WBC 5.22   HGB 11.1*   HCT 34.1*        CMP: No results for input(s): "NA", "K", "CL", "CO2", "GLU", "BUN", "CREATININE", "CALCIUM", "PROT", "ALBUMIN", "BILITOT", "ALKPHOS", "AST", "ALT", "ANIONGAP", "EGFRNONAA" in the last 48 hours.    Invalid input(s): "ESTGFAFR[place]"  All pertinent labs within the past 24 hours have been reviewed.    Significant Imaging: I have reviewed all pertinent imaging results/findings within the past 24 hours.  "

## 2025-07-09 NOTE — PROGRESS NOTES
[facility] - Telemetry ([facility])  Infectious Disease  Progress Note    Patient Name: Tiffanie Gao  MRN: [id number]  Admission Date: 7/3/2025  Length of Stay: 5 days  Attending Physician: No att. providers found  Primary Care Provider: Meeks, Claude H., MD    Isolation Status: No active isolations  Assessment/Plan:      ID  Bacteremia due to Escherichia coli  Isolate is and ESBL strain.  We will continue  Ertapenem and will use meropenem in AM  .  She will need at least 2 weeks of treatment from negative blood culture.  Blood culture 0 7/0 3 ESBL E coli.  Blood culture 0 7/0 5 pending  Ertapenem is protein bound with her low albumin 1 dose of ertapenem might  non last up to 24 hours as expected    07/08-  I had an extensive discussion with her at bedside.  I tried to explain to her as much as he can understand that there is no good effective oral treatment.  The only other parenteral option will be I am ertapenem the difficulty with this regimen is the fact that he had a low albumin and a dose of ertapenem may not last 24 hours as expected.  Optimizing the dose of this drug we will also be balanced with the risk of possible seizures with to high dose.  At this time we will do  Outpatient Antibiotic Therapy Plan:     Please send referral to [facility].     1) Infection:  ESBL  E coli     2) Discharge Antibiotics:     Intravenous antibiotics:  IM ERTAPENEM  500MG BID   3) Therapy Duration:  2 WEEKS      Estimated end date of IV antibiotics: 07/19/25     4) Outpatient Weekly Labs:     Order the following labs to be drawn on Mondays:   CBC  CMP   CPK (when on Daptomycin)  ESR  CRP  -Can pull picc line at end of treatment unless instructed otherwise .   Please send all labs to [facility] .      GI  Biliary drain displacement  Will follow up primary team        Anticipated Disposition:     Thank you for your consult. I will follow-up with patient. Please contact us if you have any additional questions.    Jefferson DUKE  MD Patrick, [facility]  Infectious Disease  [facility] ([facility])    Subjective:     Principal Problem:Sepsis    HPI: 50 y/o female with PMHx of COPD, asthma, liver disease, cholecystectomy, hysterectomy, partial liver resection, underwent PTC with drain placement in May,.  She was transferred from [place] with c/o fevers/chills, RUQ pain, and drainage worsening over past several weeks.   She had  PTC with drain placement, stent placed in bile duct 5/26; she noted discharge around tract site and completed course of antibiotics.  She was noted to have fever-T-max 102.5° on admit.  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   Blood culture 0703 showed ESBL E coli  Interval History:   49-year-old woman with ESBL Klebsiella bacteremia.  She continues to refuse IV antibiotics and PICC line    Review of Systems   Constitutional:  Negative for activity change, appetite change, chills and diaphoresis.   HENT:  Negative for congestion.    Neurological:  Negative for dizziness and facial asymmetry.     Objective:     Vital Signs (Most Recent):  Temp: 97.8 °F (36.6 °C) (07/08/25 1644)  Pulse: 79 (07/08/25 1644)  Resp: 18 (07/08/25 1644)  BP: 123/75 (07/08/25 1644)  SpO2: 98 % (07/08/25 1644) Vital Signs (24h Range):  Temp:  [97.8 °F (36.6 °C)-98.1 °F (36.7 °C)] 97.8 °F (36.6 °C)  Pulse:  [78-92] 79  Resp:  [16-18] 18  SpO2:  [98 %-99 %] 98 %  BP: (106-123)/(75-76) 123/75     Weight: 42.6 kg (93 lb 14.7 oz)  Body mass index is 20.32 kg/m².    Estimated Creatinine Clearance: 74.6 mL/min (based on SCr of 0.6 mg/dL).     Physical Exam  Vitals and nursing note reviewed.   HENT:      Head: Normocephalic.   Musculoskeletal:         General: Normal range of motion.   Skin:     General: Skin is warm.   Neurological:      General: No focal deficit present.      Mental Status: She is alert.          Significant Labs: Blood  "Culture: No results for input(s): "LABBLOO" in the last 4320 hours.  CBC:   Recent Labs   Lab 07/08/25  0430   WBC 5.22   HGB 11.1*   HCT 34.1*        CMP: No results for input(s): "NA", "K", "CL", "CO2", "GLU", "BUN", "CREATININE", "CALCIUM", "PROT", "ALBUMIN", "BILITOT", "ALKPHOS", "AST", "ALT", "ANIONGAP", "EGFRNONAA" in the last 48 hours.    Invalid input(s): "ESTGFAFR[place]"  All pertinent labs within the past 24 hours have been reviewed.    Significant Imaging: I have reviewed all pertinent imaging results/findings within the past 24 hours.  "

## 2025-07-09 NOTE — ASSESSMENT & PLAN NOTE
Isolate is and ESBL strain.  We will continue  Ertapenem and will use meropenem in AM  .  She will need at least 2 weeks of treatment from negative blood culture.  Blood culture 0 7/0 3 ESBL E coli.  Blood culture 0 7/0 5 pending  Ertapenem is protein bound with her low albumin 1 dose of ertapenem might  non last up to 24 hours as expected    07/08-  I had an extensive discussion with her at bedside.  I tried to explain to her as much as he can understand that there is no good effective oral treatment.  The only other parenteral option will be I am ertapenem the difficulty with this regimen is the fact that he had a low albumin and a dose of ertapenem may not last 24 hours as expected.  Optimizing the dose of this drug we will also be balanced with the risk of possible seizures with to high dose.  At this time we will do  Outpatient Antibiotic Therapy Plan:     Please send referral to [facility].     1) Infection:  ESBL  E coli     2) Discharge Antibiotics:     Intravenous antibiotics:  IM ERTAPENEM  500MG BID   3) Therapy Duration:  2 WEEKS      Estimated end date of IV antibiotics: 07/19/25     4) Outpatient Weekly Labs:     Order the following labs to be drawn on Mondays:   CBC  CMP   CPK (when on Daptomycin)  ESR  CRP  -Can pull picc line at end of treatment unless instructed otherwise .   Please send all labs to [facility] .

## 2025-07-10 LAB
BACTERIA BLD CULT: NORMAL
BACTERIA BLD CULT: NORMAL

## 2025-07-14 NOTE — PHYSICIAN QUERY
I will send in atenolol 25 mg daily.   Mary Alcazar MD Please clarify if there is any clinical correlation between condition and condition. Are the conditions:  Due to or associated with each other

## 2025-08-25 ENCOUNTER — TELEPHONE (OUTPATIENT)
Dept: HEPATOLOGY | Facility: CLINIC | Age: 50
End: 2025-08-25
Payer: MEDICAID

## 2025-08-25 DIAGNOSIS — R79.89 ELEVATED LFTS: Primary | ICD-10-CM
